# Patient Record
Sex: MALE | Race: WHITE | NOT HISPANIC OR LATINO | Employment: OTHER | URBAN - METROPOLITAN AREA
[De-identification: names, ages, dates, MRNs, and addresses within clinical notes are randomized per-mention and may not be internally consistent; named-entity substitution may affect disease eponyms.]

---

## 2017-10-30 ENCOUNTER — ANESTHESIA (INPATIENT)
Dept: PERIOP | Facility: HOSPITAL | Age: 52
DRG: 710 | End: 2017-10-30
Payer: COMMERCIAL

## 2017-10-30 ENCOUNTER — ANESTHESIA EVENT (INPATIENT)
Dept: PERIOP | Facility: HOSPITAL | Age: 52
DRG: 710 | End: 2017-10-30
Payer: COMMERCIAL

## 2017-10-30 ENCOUNTER — HOSPITAL ENCOUNTER (INPATIENT)
Facility: HOSPITAL | Age: 52
LOS: 8 days | Discharge: HOME HEALTH | DRG: 710 | End: 2017-11-07
Attending: EMERGENCY MEDICINE | Admitting: HOSPITALIST
Payer: COMMERCIAL

## 2017-10-30 ENCOUNTER — APPOINTMENT (EMERGENCY)
Dept: RADIOLOGY | Facility: HOSPITAL | Age: 52
DRG: 710 | End: 2017-10-30
Payer: COMMERCIAL

## 2017-10-30 ENCOUNTER — APPOINTMENT (INPATIENT)
Dept: RADIOLOGY | Facility: HOSPITAL | Age: 52
DRG: 710 | End: 2017-10-30
Payer: COMMERCIAL

## 2017-10-30 DIAGNOSIS — E11.628 DIABETIC FOOT INFECTION (HCC): Primary | ICD-10-CM

## 2017-10-30 DIAGNOSIS — L03.116 CELLULITIS OF LEFT FOOT: ICD-10-CM

## 2017-10-30 DIAGNOSIS — L08.9 DIABETIC FOOT INFECTION (HCC): Primary | ICD-10-CM

## 2017-10-30 DIAGNOSIS — A41.9 SEVERE SEPSIS (HCC): ICD-10-CM

## 2017-10-30 DIAGNOSIS — L97.523 DIABETIC ULCER OF TOE OF LEFT FOOT ASSOCIATED WITH DIABETES MELLITUS DUE TO UNDERLYING CONDITION, WITH NECROSIS OF MUSCLE (HCC): ICD-10-CM

## 2017-10-30 DIAGNOSIS — L97.509 TYPE 2 DIABETES MELLITUS WITH FOOT ULCER, WITHOUT LONG-TERM CURRENT USE OF INSULIN (HCC): Chronic | ICD-10-CM

## 2017-10-30 DIAGNOSIS — E08.621 DIABETIC ULCER OF TOE OF LEFT FOOT ASSOCIATED WITH DIABETES MELLITUS DUE TO UNDERLYING CONDITION, WITH NECROSIS OF MUSCLE (HCC): ICD-10-CM

## 2017-10-30 DIAGNOSIS — E11.621 TYPE 2 DIABETES MELLITUS WITH FOOT ULCER, WITHOUT LONG-TERM CURRENT USE OF INSULIN (HCC): Chronic | ICD-10-CM

## 2017-10-30 DIAGNOSIS — A48.0 GAS GANGRENE OF FOOT (HCC): ICD-10-CM

## 2017-10-30 DIAGNOSIS — R65.20 SEVERE SEPSIS (HCC): ICD-10-CM

## 2017-10-30 DIAGNOSIS — M86.172 ACUTE OSTEOMYELITIS OF TOE, LEFT (HCC): ICD-10-CM

## 2017-10-30 PROBLEM — I10 ESSENTIAL HYPERTENSION: Chronic | Status: ACTIVE | Noted: 2017-10-30

## 2017-10-30 LAB
ANION GAP SERPL CALCULATED.3IONS-SCNC: 10 MMOL/L (ref 4–13)
BASOPHILS # BLD AUTO: 0.06 THOUSANDS/ΜL (ref 0–0.1)
BASOPHILS NFR BLD AUTO: 0 % (ref 0–1)
BUN SERPL-MCNC: 8 MG/DL (ref 5–25)
CALCIUM SERPL-MCNC: 8.6 MG/DL (ref 8.3–10.1)
CHLORIDE SERPL-SCNC: 90 MMOL/L (ref 100–108)
CO2 SERPL-SCNC: 27 MMOL/L (ref 21–32)
CREAT SERPL-MCNC: 0.95 MG/DL (ref 0.6–1.3)
EOSINOPHIL # BLD AUTO: 0.03 THOUSAND/ΜL (ref 0–0.61)
EOSINOPHIL NFR BLD AUTO: 0 % (ref 0–6)
ERYTHROCYTE [DISTWIDTH] IN BLOOD BY AUTOMATED COUNT: 11.9 % (ref 11.6–15.1)
GFR SERPL CREATININE-BSD FRML MDRD: 90 ML/MIN/1.73SQ M
GLUCOSE SERPL-MCNC: 302 MG/DL (ref 65–140)
GLUCOSE SERPL-MCNC: 475 MG/DL (ref 65–140)
HCT VFR BLD AUTO: 40.3 % (ref 36.5–49.3)
HGB BLD-MCNC: 13.8 G/DL (ref 12–17)
LACTATE SERPL-SCNC: 0.9 MMOL/L (ref 0.5–2)
LACTATE SERPL-SCNC: 2.9 MMOL/L (ref 0.5–2)
LYMPHOCYTES # BLD AUTO: 1.86 THOUSANDS/ΜL (ref 0.6–4.47)
LYMPHOCYTES NFR BLD AUTO: 9 % (ref 14–44)
MCH RBC QN AUTO: 28.9 PG (ref 26.8–34.3)
MCHC RBC AUTO-ENTMCNC: 34.2 G/DL (ref 31.4–37.4)
MCV RBC AUTO: 84 FL (ref 82–98)
MONOCYTES # BLD AUTO: 1.12 THOUSAND/ΜL (ref 0.17–1.22)
MONOCYTES NFR BLD AUTO: 5 % (ref 4–12)
NEUTROPHILS # BLD AUTO: 18.25 THOUSANDS/ΜL (ref 1.85–7.62)
NEUTS SEG NFR BLD AUTO: 86 % (ref 43–75)
PLATELET # BLD AUTO: 563 THOUSANDS/UL (ref 149–390)
PMV BLD AUTO: 8.8 FL (ref 8.9–12.7)
POTASSIUM SERPL-SCNC: 3.8 MMOL/L (ref 3.5–5.3)
RBC # BLD AUTO: 4.78 MILLION/UL (ref 3.88–5.62)
SODIUM SERPL-SCNC: 127 MMOL/L (ref 136–145)
WBC # BLD AUTO: 21.32 THOUSAND/UL (ref 4.31–10.16)

## 2017-10-30 PROCEDURE — 96365 THER/PROPH/DIAG IV INF INIT: CPT

## 2017-10-30 PROCEDURE — 0Y6Q0Z0 DETACHMENT AT LEFT 1ST TOE, COMPLETE, OPEN APPROACH: ICD-10-PCS | Performed by: PODIATRIST

## 2017-10-30 PROCEDURE — 83605 ASSAY OF LACTIC ACID: CPT | Performed by: HOSPITALIST

## 2017-10-30 PROCEDURE — 0QBP0ZZ EXCISION OF LEFT METATARSAL, OPEN APPROACH: ICD-10-PCS | Performed by: PODIATRIST

## 2017-10-30 PROCEDURE — 87076 CULTURE ANAEROBE IDENT EACH: CPT | Performed by: EMERGENCY MEDICINE

## 2017-10-30 PROCEDURE — 73630 X-RAY EXAM OF FOOT: CPT

## 2017-10-30 PROCEDURE — 87077 CULTURE AEROBIC IDENTIFY: CPT | Performed by: PHYSICIAN ASSISTANT

## 2017-10-30 PROCEDURE — 80048 BASIC METABOLIC PNL TOTAL CA: CPT | Performed by: EMERGENCY MEDICINE

## 2017-10-30 PROCEDURE — 88311 DECALCIFY TISSUE: CPT | Performed by: PODIATRIST

## 2017-10-30 PROCEDURE — 82948 REAGENT STRIP/BLOOD GLUCOSE: CPT

## 2017-10-30 PROCEDURE — 36415 COLL VENOUS BLD VENIPUNCTURE: CPT | Performed by: EMERGENCY MEDICINE

## 2017-10-30 PROCEDURE — 87070 CULTURE OTHR SPECIMN AEROBIC: CPT | Performed by: PHYSICIAN ASSISTANT

## 2017-10-30 PROCEDURE — 87040 BLOOD CULTURE FOR BACTERIA: CPT | Performed by: EMERGENCY MEDICINE

## 2017-10-30 PROCEDURE — 83605 ASSAY OF LACTIC ACID: CPT | Performed by: EMERGENCY MEDICINE

## 2017-10-30 PROCEDURE — 85025 COMPLETE CBC W/AUTO DIFF WBC: CPT | Performed by: EMERGENCY MEDICINE

## 2017-10-30 PROCEDURE — 99285 EMERGENCY DEPT VISIT HI MDM: CPT

## 2017-10-30 PROCEDURE — 87186 SC STD MICRODIL/AGAR DIL: CPT | Performed by: PHYSICIAN ASSISTANT

## 2017-10-30 PROCEDURE — 87205 SMEAR GRAM STAIN: CPT | Performed by: PODIATRIST

## 2017-10-30 PROCEDURE — 87185 SC STD ENZYME DETCJ PER NZM: CPT | Performed by: PODIATRIST

## 2017-10-30 PROCEDURE — 87147 CULTURE TYPE IMMUNOLOGIC: CPT | Performed by: PODIATRIST

## 2017-10-30 PROCEDURE — 87205 SMEAR GRAM STAIN: CPT | Performed by: PHYSICIAN ASSISTANT

## 2017-10-30 PROCEDURE — 88305 TISSUE EXAM BY PATHOLOGIST: CPT | Performed by: PODIATRIST

## 2017-10-30 PROCEDURE — 87070 CULTURE OTHR SPECIMN AEROBIC: CPT | Performed by: PODIATRIST

## 2017-10-30 PROCEDURE — 87075 CULTR BACTERIA EXCEPT BLOOD: CPT | Performed by: PODIATRIST

## 2017-10-30 RX ORDER — MIDAZOLAM HYDROCHLORIDE 1 MG/ML
INJECTION INTRAMUSCULAR; INTRAVENOUS AS NEEDED
Status: DISCONTINUED | OUTPATIENT
Start: 2017-10-30 | End: 2017-10-30 | Stop reason: SURG

## 2017-10-30 RX ORDER — SODIUM CHLORIDE 9 MG/ML
125 INJECTION, SOLUTION INTRAVENOUS CONTINUOUS
Status: DISCONTINUED | OUTPATIENT
Start: 2017-10-30 | End: 2017-11-03

## 2017-10-30 RX ORDER — ONDANSETRON 2 MG/ML
4 INJECTION INTRAMUSCULAR; INTRAVENOUS EVERY 6 HOURS PRN
Status: DISCONTINUED | OUTPATIENT
Start: 2017-10-30 | End: 2017-11-07 | Stop reason: HOSPADM

## 2017-10-30 RX ORDER — INSULIN GLARGINE 100 [IU]/ML
40 INJECTION, SOLUTION SUBCUTANEOUS
Status: DISCONTINUED | OUTPATIENT
Start: 2017-10-30 | End: 2017-11-07

## 2017-10-30 RX ORDER — LISINOPRIL 10 MG/1
10 TABLET ORAL DAILY
Status: DISCONTINUED | OUTPATIENT
Start: 2017-10-30 | End: 2017-11-07 | Stop reason: HOSPADM

## 2017-10-30 RX ORDER — FENTANYL CITRATE 50 UG/ML
INJECTION, SOLUTION INTRAMUSCULAR; INTRAVENOUS AS NEEDED
Status: DISCONTINUED | OUTPATIENT
Start: 2017-10-30 | End: 2017-10-30 | Stop reason: SURG

## 2017-10-30 RX ORDER — SODIUM CHLORIDE, SODIUM LACTATE, POTASSIUM CHLORIDE, CALCIUM CHLORIDE 600; 310; 30; 20 MG/100ML; MG/100ML; MG/100ML; MG/100ML
INJECTION, SOLUTION INTRAVENOUS CONTINUOUS PRN
Status: DISCONTINUED | OUTPATIENT
Start: 2017-10-30 | End: 2017-10-30 | Stop reason: SURG

## 2017-10-30 RX ORDER — LISINOPRIL 10 MG/1
10 TABLET ORAL DAILY
COMMUNITY

## 2017-10-30 RX ORDER — NICOTINE 21 MG/24HR
1 PATCH, TRANSDERMAL 24 HOURS TRANSDERMAL DAILY
Status: DISCONTINUED | OUTPATIENT
Start: 2017-10-30 | End: 2017-11-07 | Stop reason: HOSPADM

## 2017-10-30 RX ADMIN — PIPERACILLIN SODIUM,TAZOBACTAM SODIUM 3.38 G: 3; .375 INJECTION, POWDER, FOR SOLUTION INTRAVENOUS at 15:52

## 2017-10-30 RX ADMIN — FENTANYL CITRATE 50 MCG: 50 INJECTION INTRAMUSCULAR; INTRAVENOUS at 19:54

## 2017-10-30 RX ADMIN — SODIUM CHLORIDE 1000 ML: 0.9 INJECTION, SOLUTION INTRAVENOUS at 12:35

## 2017-10-30 RX ADMIN — SODIUM CHLORIDE, SODIUM LACTATE, POTASSIUM CHLORIDE, AND CALCIUM CHLORIDE: .6; .31; .03; .02 INJECTION, SOLUTION INTRAVENOUS at 19:52

## 2017-10-30 RX ADMIN — FENTANYL CITRATE 50 MCG: 50 INJECTION INTRAMUSCULAR; INTRAVENOUS at 19:56

## 2017-10-30 RX ADMIN — CEFEPIME 2000 MG: 2 INJECTION, POWDER, FOR SOLUTION INTRAMUSCULAR; INTRAVENOUS at 12:35

## 2017-10-30 RX ADMIN — VANCOMYCIN HYDROCHLORIDE 1500 MG: 1 INJECTION, POWDER, LYOPHILIZED, FOR SOLUTION INTRAVENOUS at 13:05

## 2017-10-30 RX ADMIN — ENOXAPARIN SODIUM 40 MG: 40 INJECTION SUBCUTANEOUS at 15:14

## 2017-10-30 RX ADMIN — SODIUM CHLORIDE 1000 ML: 0.9 INJECTION, SOLUTION INTRAVENOUS at 13:32

## 2017-10-30 RX ADMIN — SODIUM CHLORIDE 125 ML/HR: 0.9 INJECTION, SOLUTION INTRAVENOUS at 14:36

## 2017-10-30 RX ADMIN — MIDAZOLAM HYDROCHLORIDE 2 MG: 1 INJECTION, SOLUTION INTRAMUSCULAR; INTRAVENOUS at 19:54

## 2017-10-30 RX ADMIN — INSULIN LISPRO 3 UNITS: 100 INJECTION, SOLUTION INTRAVENOUS; SUBCUTANEOUS at 21:56

## 2017-10-30 RX ADMIN — SODIUM CHLORIDE 1000 ML: 0.9 INJECTION, SOLUTION INTRAVENOUS at 13:33

## 2017-10-30 RX ADMIN — INSULIN GLARGINE 40 UNITS: 100 INJECTION, SOLUTION SUBCUTANEOUS at 21:55

## 2017-10-30 NOTE — CONSULTS
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   This is clearly a limb threatening infection  Fortunately thus far the patient has remained hemodynamically stable and it appears that the infection has remained confined to the forefoot  The patient has tolerated his 1st dose of intravenous antibiotics  I explained to the patient that he will need significant surgery for this infection   -continue vancomycin for now at current dose  -increase Zosyn to 4 5 g IV q 6 hours to cover Pseudomonas aeruginosa  -follow-up wound culture  -await urgent podiatry evaluation for surgical intervention  -serial exams  -monitor CBC with diff and creatinine    2  Severe sepsis-POA  Leukocytosis and tachycardia with elevated lactate  Fortunately the patient remains hemodynamically stable  Appears all secondary to 1  No other clear source appreciated  Consideration for the possibility of bacteremia   -antibiotics as above  -follow-up blood cultures  -supportive care  -no additional ID workup for now    3  Diabetes mellitus-type 2 with hyperglycemia  Very poorly controlled diabetes at this time   -tighten glucose control as per the primary service    4  Hyponatremia-likely pseudohyponatremia secondary to the hyperglycemia   -monitor BMP    Discussed in detail with Podiatry and the primary service as well as the patient and his wife     HISTORY OF PRESENT ILLNESS:  Reason for Consult:  Gas gangrene of the left foot  HPI: Denae Meredith is a 45y o  year old male with a history of poorly controlled diabetes mellitus admitted to [facility] with worsening swelling, redness, and gangrene of his left hallux who I am asked to assist with management  The patient denies having any recent injury to the foot    However he has been in work boots in recent weeks and has been on his feet quite a bit  Over the past 1-2 weeks the patient has developed worsening swelling and drainage from his left hallux  Apparently the odor became so bad that a co-worker was forced to get out of the car to get a deep breath  He apparently had rapidly increasing gangrenous change in was involving the left hallux  He also complained of subjective fevers and shaking chills as well as drenching sweats  His glucose also became more poorly controlled  Because of these worsening symptoms the patient came to the ER today for further evaluation  In the emergency department the patient was found to have a gangrenous left hallux with therese id swelling and malodorous drainage  X-ray of the foot revealed evidence of gas gangrene  The patient had blood cultures obtained and was started on vancomycin and Zosyn and admitted for further management  Thus far the patient has remained afebrile although his temperature has been modestly elevated  He has been noted to have a brisk leukocytosis and has tachycardia  He did have 1 episode of emesis last week but has not had any other nausea vomiting or diarrhea  He denies any cough or difficulty breathing, denies any foot pain, denies any dysuria or hematuria, denies any other rash or skin lesions  REVIEW OF SYSTEMS:  A complete 12 point system-based review of systems is otherwise negative  PAST MEDICAL HISTORY:  Past Medical History:   Diagnosis Date    Diabetes mellitus ([facility] [address] )     Hypertension      History reviewed  No pertinent surgical history  FAMILY HISTORY:  Non-contributory    SOCIAL HISTORY:  Social History   History   Alcohol Use    Yes     History   Drug Use No     History   Smoking Status    Current Some Day Smoker    Types: Cigars   Smokeless Tobacco    Never Used       ALLERGIES:  No Known Allergies    MEDICATIONS:  All current active medications have been reviewed  Antibiotics:  Vancomycin and Zosyn 1      PHYSICAL EXAM:  HR: [101-131] 101  Resp:  [14-20] 20  BP: (140-175)/() 145/85  SpO2:  [91 %-97 %] 95 %  Temp (24hrs), Av 3 °F (37 4 °C), Min:98 7 °F (37 1 °C), Max:100 °F (37 8 °C)  Current: Temperature: 100 °F (37 8 °C)    Intake/Output Summary (Last 24 hours) at 10/30/17 1624  Last data filed at 10/30/17 1335   Gross per 24 hour   Intake             1050 ml   Output                0 ml   Net             1050 ml       General Appearance:  Nontoxic, and in no distress   Head:  Normocephalic, without obvious abnormality, atraumatic   Eyes:  Conjunctiva pink and sclera anicteric, both eyes   Nose: Nares normal, mucosa normal, no drainage   Throat: Oropharynx moist without lesions   Neck: Supple, symmetrical, no adenopathy, no tenderness/mass/nodules   Back:   Symmetric, no curvature, ROM normal, no CVA tenderness   Lungs:   Clear to auscultation bilaterally, respirations unlabored   Chest Wall:  No tenderness or deformity   Heart:  Tachycardic; no murmur, rub or gallop   Abdomen:   Soft, non-tender, non-distended, positive bowel sounds,    Extremities: No cyanosis, clubbing  Left hallux markedly swollen, gangrenous, with blackish malodorous drainage  Erythema progressing onto the forefoot   Skin: No other rashes or lesions  No other draining wounds noted  Lymph nodes: Cervical, supraclavicular nodes normal   Neurologic: Alert and oriented times 3, extremity strength 5/5 and symmetric       LABS, IMAGING, & OTHER STUDIES:  Lab Results:  I have personally reviewed pertinent labs      Results from last 7 days  Lab Units 10/30/17  1215   WBC Thousand/uL 21 32*   HEMOGLOBIN g/dL 13 8   PLATELETS Thousands/uL 563*       Results from last 7 days  Lab Units 10/30/17  1215   SODIUM mmol/L 127*   POTASSIUM mmol/L 3 8   CHLORIDE mmol/L 90*   CO2 mmol/L 27   ANION GAP mmol/L 10   BUN mg/dL 8   CREATININE mg/dL 0 95   EGFR ml/min/1 73sq m 90   GLUCOSE RANDOM mg/dL 475*   CALCIUM mg/dL 8 6        blood cultures x2 sets pending    Imaging Studies:   I have personally reviewed pertinent imaging study reports and images in PACS  X-ray left foot-1   Extensive subcutaneous air noted at the 1st toe extending to the 1st metatarsal region along with soft tissue swelling   Correlate for gas gangrene    2    Comminuted fracture at the 1st distal phalanx proximally   Fracture margins are ill-defined, underlying osteomyelitis is not excluded

## 2017-10-30 NOTE — ED PROCEDURE NOTE
PROCEDURE  CriticalCare Time  Performed by: Palmer Ordaz  Authorized by: Palmer Ordaz     Critical care provider statement:     Critical care time (minutes):  35    Critical care start time:  10/30/2017 12:25 PM    Critical care end time:  10/30/2017 1:00 PM    Critical care time was exclusive of:  Separately billable procedures and treating other patients and teaching time    Critical care was time spent personally by me on the following activities:  Examination of patient, discussions with consultants, evaluation of patient's response to treatment, ordering and review of radiographic studies, ordering and review of laboratory studies and re-evaluation of patient's condition    I assumed direction of critical care for this patient from another provider in my specialty: no

## 2017-10-30 NOTE — H&P
History and Physical - [facility] Internal Medicine    Patient Information: Ilean Ahumada 45 y o  male MRN: [id number]  Unit/Bed#: DEMARCO Encounter: [id number]  Admitting Physician: Iztel Irizarry PA-C  PCP: Loren Gregorio DO  Date of Admission:  10/30/17    Assessment/Plan:    Hospital Problem List:     Principal Problem:    Diabetic ulcer of left foot associated with diabetes mellitus due to underlying condition, with necrosis of muscle ([facility] )  Active Problems:    Essential hypertension    Type 2 diabetes mellitus with foot ulcer, without long-term current use of insulin ([facility] )    Severe sepsis ([facility] )      Plan for the Primary Problem(s):  · Diabetic Ulcer of Left Foot Associated with Diabetes Mellitus due to underlying condition, with necrosis of muscle   · Admit patient to med/surgical under inpatient status with telemetry monitoring given sepsis   · POA: malodorous left great toe with erythema extending to mid foot circumferentially as well as edema up the left calf, leukocytosis, tachycardia, lactic acid 2 9 resulting in severe sepsis criteria, gas noted on x-ray   · Spoke with infectious disease physician about preliminary antibiotics recommendations   · Zosyn 3 375 g IV Q6  · Vancomycin 15 mg/kg Q12   · Consult infectious disease for further management   · Monitor renal function closely   · Consult podiatry   · Wound culture  · NPO now given may need procedure today   · Monitor lactic acid   · Trend temps, cbc, bmp   · Severe Sepsis  · POA: tachycardia, leukocytosis, source of infection, and lactic acid = 2 9  · Received sepsis fluid bolus in ED  · Trend lactic acid   · Antibiotics and physician consults as above     Plan for Additional Problems:   · Type 2 Diabetes Mellitus with skin complication without long term current use of insulin   · BG on admission = 476   · Hold Metformin   · Lantus 40 U QHS  · Hold mealtime insulin given NPO  · SSI insulin Q6 PRN hyperglycemia for now     VTE Prophylaxis: Enoxaparin (Lovenox)  / sequential compression device   Code Status: Full code   POLST: POLST form is not discussed and not completed at this time  Anticipated Length of Stay:  Patient will be admitted on an Inpatient basis with an anticipated length of stay of  Less than 2 midnights  Justification for Hospital Stay: IV abx, podiatry eval and surgery, severe sepsis    Total Time for Visit, including Counseling / Coordination of Care: 1 hour  Greater than 50% of this total time spent on direct patient counseling and coordination of care  Chief Complaint:   "My toe is infected"    History of Present Illness:    Kj Loera is a 45 y o  male with a history of Type 2 Diabetes Mellitus who presents with a left great toe infection  The patient reports that this has been going on for the last 1 5 weeks  He states that it started as what looked like a "callus" on the bottom part of his foot and rapidly progressed to a draining, black wound  He states that 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 and he had been wearing boots and ambulating on the foot for long hours during the day  He states that a few days ago the foot started to have a bad odor to the point where his colleague had to step out of the vehicle due to the smell  He denies pain to the area and states that he does have a history of neuropathy  He reports some mild chills  Denies any other wounds active currently, however he does state that he had a prior ulceration on his right calf that he was able to heal on his own  He denies fevers, chest pain,pressure, difficulty breathing, or difficulty swallowing  Of note the patient states that he does follow with a PCP, but states that he was only ever placed on Metformin for his diabetes  He is unsure of his last A1c  He states that he checks his sugars at home and runs between 160 to 260  He states that he does see an ophthalmologist regularly and he has documented diabetic retinopathy       Review of Systems:    Review of Systems   Constitutional: Positive for chills  Negative for appetite change, diaphoresis, fatigue and fever  HENT: Negative for congestion, rhinorrhea and sore throat  Eyes: Negative for visual disturbance  Respiratory: Negative for cough, chest tightness, shortness of breath and wheezing  Cardiovascular: Negative for chest pain, palpitations and leg swelling  Gastrointestinal: Negative for abdominal pain, constipation, diarrhea, nausea and vomiting  Genitourinary: Negative for dysuria  Musculoskeletal: Negative for arthralgias, gait problem and myalgias  Skin: Positive for color change and wound  Neurological: Negative for dizziness, syncope, weakness, light-headedness, numbness and headaches  All other systems reviewed and are negative  Past Medical and Surgical History:     Past Medical History:   Diagnosis Date    Diabetes mellitus ([facility] [address] )     Hypertension        History reviewed  No pertinent surgical history  Meds/Allergies:    Prior to Admission medications    Medication Sig Start Date End Date Taking? Authorizing Provider   lisinopril (ZESTRIL) 10 mg tablet Take 10 mg by mouth daily   Yes Historical Provider, MD   metFORMIN (GLUCOPHAGE) 500 mg tablet Take 500 mg by mouth 2 (two) times a day with meals   Yes Historical Provider, MD   ondansetron (ZOFRAN ODT) 4 mg disintegrating tablet Take 1 tablet by mouth every 8 (eight) hours as needed for nausea or vomiting 10/5/16 10/30/17  Haile Campos, DO     I have reviewed home medications with patient personally      Allergies: No Known Allergies    Social History:     Marital Status: /Civil Union   Occupation: Security  Patient Pre-hospital Living Situation: Home with wife   Patient Pre-hospital Level of Mobility: Full   Patient Pre-hospital Diet Restrictions: None  Substance Use History:   History   Alcohol Use    Yes     History   Smoking Status    Current Some Day Smoker    Types: Cigars Smokeless Tobacco    Never Used     History   Drug Use No       Family History:    History reviewed  No pertinent family history  Physical Exam:     Vitals:   Blood Pressure: 154/79 (10/30/17 1330)  Pulse: (!) 108 (10/30/17 1330)  Temperature: 98 7 °F (37 1 °C) (10/30/17 1134)  Temp Source: Oral (10/30/17 1134)  Respirations: 14 (10/30/17 1330)  Weight - Scale: 102 kg (225 lb 5 oz) (10/30/17 1105)  SpO2: 91 % (10/30/17 1330)    Physical Exam   Constitutional: He is oriented to person, place, and time  Vital signs are normal  He appears well-developed and well-nourished  Non-toxic appearance  No distress  HENT:   Head: Normocephalic and atraumatic  Mouth/Throat: Mucous membranes are not dry  Normal dentition  Eyes: Conjunctivae and EOM are normal  Pupils are equal, round, and reactive to light  Right pupil is round and reactive  Left pupil is round and reactive  Pupils are equal    Neck: Neck supple  Cardiovascular: Normal rate, regular rhythm, S1 normal, S2 normal, normal heart sounds and intact distal pulses  Exam reveals no S3 and no S4  No murmur heard  Pulmonary/Chest: Effort normal and breath sounds normal  No accessory muscle usage  No respiratory distress  He has no decreased breath sounds  He has no wheezes  He has no rhonchi  He has no rales  He exhibits no tenderness  Abdominal: Soft  Bowel sounds are normal  He exhibits no distension and no mass  There is no tenderness  There is no rigidity, no rebound and no guarding  Neurological: He is alert and oriented to person, place, and time  A sensory deficit (sensory deficit in stocking formation of the bilateral lower extremity up to the mid-calf) is present  Skin: Skin is warm and dry  Additional Data:     Lab Results: I have personally reviewed pertinent reports          Results from last 7 days  Lab Units 10/30/17  1215   WBC Thousand/uL 21 32*   HEMOGLOBIN g/dL 13 8   HEMATOCRIT % 40 3   PLATELETS Thousands/uL 563* NEUTROS PCT % 86*   LYMPHS PCT % 9*   MONOS PCT % 5   EOS PCT % 0       Results from last 7 days  Lab Units 10/30/17  1215   SODIUM mmol/L 127*   POTASSIUM mmol/L 3 8   CHLORIDE mmol/L 90*   CO2 mmol/L 27   BUN mg/dL 8   CREATININE mg/dL 0 95   CALCIUM mg/dL 8 6   GLUCOSE RANDOM mg/dL 475*           Imaging: I have personally reviewed pertinent reports  Xr Foot 3+ Views Left    Result Date: 10/30/2017  Narrative: LEFT FOOT INDICATION:  Subcutaneous air  COMPARISON: None VIEWS:  3 IMAGES:  4 FINDINGS: Extensive subcutaneous air is noted at the 1st extending to the 1st metatarsal region along with soft tissue swelling  This partially limits evaluation of underlying osseous structures  There does appear to be a comminuted fracture at the 1st distal phalanx proximally  Fracture margins are ill-defined, underlying osteomyelitis is not excluded  Hammertoe is noted of the 2nd and 3rd toes  There is a plantar calcaneal heel spur  Impression: 1  Extensive subcutaneous air noted at the 1st toe extending to the 1st metatarsal region along with soft tissue swelling  Correlate for gas gangrene  2    Comminuted fracture at the 1st distal phalanx proximally  Fracture margins are ill-defined, underlying osteomyelitis is not excluded  ##[email or web address]##[email or web address] Workstation performed: [id number]       EKG, Pathology, and Other Studies Reviewed on Admission:   · XR left foot: gas present in medial side of study as well as through out the great toe, suspicious for osteo    Allscripts Records Reviewed: No     ** Please Note: Dragon 360 Dictation voice to text software may have been used in the creation of this document   **

## 2017-10-30 NOTE — ED PROVIDER NOTES
History  Chief Complaint   Patient presents with    Toe Injury - Major     c/o left great toe and toenail black with odorous drainage noted  Also left foot and ankle swelling and increased blood sugars x 1 week  45 YR  MALE NIDDM ON METFROMIN -- STATES ALWAys had small opne area on plantar asepct of left great toe -- over last 2 weeks with progressive redness/warmth/ discoloration of left great toe-- pt has ble neuropathy- has no0 ble sensation  From feet to mid ble- no trauma-  To area-  N/v at wortk 1 week ago- bs greater than 200--  Mild chills- no defiatne fevers-         History provided by:  Patient and spouse   used: No        Prior to Admission Medications   Prescriptions Last Dose Informant Patient Reported? Taking?   ondansetron (ZOFRAN ODT) 4 mg disintegrating tablet   No No   Sig: Take 1 tablet by mouth every 8 (eight) hours as needed for nausea or vomiting      Facility-Administered Medications: None       Past Medical History:   Diagnosis Date    Diabetes mellitus ([facility] [address] )     Hypertension        History reviewed  No pertinent surgical history  History reviewed  No pertinent family history  I have reviewed and agree with the history as documented  Social History   Substance Use Topics    Smoking status: Current Some Day Smoker     Types: Cigars    Smokeless tobacco: Never Used    Alcohol use Yes        Review of Systems   Constitutional: Positive for appetite change and chills  Negative for activity change, diaphoresis, fatigue, fever and unexpected weight change  HENT: Negative  Eyes: Negative  Cardiovascular: Positive for leg swelling  Negative for chest pain and palpitations  Gastrointestinal: Negative  Endocrine: Negative  Genitourinary: Negative  Musculoskeletal: Negative  Skin: Positive for wound  Negative for color change, pallor and rash  Allergic/Immunologic: Negative  Neurological: Negative  Hematological: Negative  Psychiatric/Behavioral: Negative  Physical Exam  ED Triage Vitals [10/30/17 1105]   Temperature Pulse Respirations Blood Pressure SpO2   99 1 °F (37 3 °C) (!) 131 20 (!) 175/100 97 %      Temp Source Heart Rate Source Patient Position - Orthostatic VS BP Location FiO2 (%)   Oral Monitor Sitting Left arm --      Pain Score       No Pain           Orthostatic Vital Signs  Vitals:    10/30/17 1105 10/30/17 1230   BP: (!) 175/100 140/86   Pulse: (!) 131 (!) 110   Patient Position - Orthostatic VS: Sitting        Physical Exam   Constitutional: He is oriented to person, place, and time  He appears well-developed and well-nourished  No distress  tchycardic-- pulse ox 95 % on ra- intepretation is normal- no [id number]    HENT:   Head: Normocephalic and atraumatic  Eyes: Conjunctivae and EOM are normal  Pupils are equal, round, and reactive to light  Right eye exhibits no discharge  Left eye exhibits no discharge  No scleral icterus  Mm pink   Neck: Normal range of motion  Neck supple  No JVD present  No tracheal deviation present  No thyromegaly present  Cardiovascular: Regular rhythm, normal heart sounds and intact distal pulses  Exam reveals no gallop and no friction rub  No murmur heard  Pulmonary/Chest: Effort normal and breath sounds normal  No stridor  No respiratory distress  He has no wheezes  He has no rales  He exhibits no tenderness  Abdominal: Soft  Bowel sounds are normal  He exhibits no distension and no mass  There is no tenderness  There is no rebound and no guarding  No hernia  Musculoskeletal: He exhibits edema and deformity  He exhibits no tenderness  Lymphadenopathy:     He has no cervical adenopathy  Neurological: He is alert and oriented to person, place, and time  No cranial nerve deficit or sensory deficit  He exhibits normal muscle tone   Coordination normal    2 plus ble lle pretibial edema- pos necrotic  Left great toe  With mutliple opne areas- defromity -- midl distal mid dorsum of foot erythema- no crepitus-- ascending erythema   Skin: Skin is warm  Capillary refill takes less than 2 seconds  He is not diaphoretic  There is erythema  No pallor  Psychiatric: He has a normal mood and affect  His behavior is normal  Thought content normal    Nursing note and vitals reviewed  ED Medications  Medications   sodium chloride 0 9 % bolus 1,000 mL (1,000 mL Intravenous New Bag 10/30/17 1235)   vancomycin (VANCOCIN) 1,500 mg in sodium chloride 0 9 % 250 mL IVPB (not administered)   cefepime (MAXIPIME) 2 g/50 mL dextrose IVPB (2,000 mg Intravenous New Bag 10/30/17 1235)       Diagnostic Studies  Results Reviewed     Procedure Component Value Units Date/Time    CBC and differential [[id number]]  (Abnormal) Collected:  10/30/17 1215    Lab Status:  Final result Specimen:  Blood from Arm, Right Updated:  10/30/17 1226     WBC 21 32 (H) Thousand/uL      RBC 4 78 Million/uL      Hemoglobin 13 8 g/dL      Hematocrit 40 3 %      MCV 84 fL      MCH 28 9 pg      MCHC 34 2 g/dL      RDW 11 9 %      MPV 8 8 (L) fL      Platelets 017 (H) Thousands/uL      Neutrophils Relative 86 (H) %      Lymphocytes Relative 9 (L) %      Monocytes Relative 5 %      Eosinophils Relative 0 %      Basophils Relative 0 %      Neutrophils Absolute 18 25 (H) Thousands/µL      Lymphocytes Absolute 1 86 Thousands/µL      Monocytes Absolute 1 12 Thousand/µL      Eosinophils Absolute 0 03 Thousand/µL      Basophils Absolute 0 06 Thousands/µL     Blood culture #1 [[id number]] Collected:  10/30/17 1215    Lab Status: In process Specimen:  Blood from Arm, Right Updated:  10/30/17 1223    Blood culture #2 [[id number]] Collected:  10/30/17 1215    Lab Status: In process Specimen:  Blood from Arm, Right Updated:  10/30/17 7228    Basic metabolic panel [[id number]] Collected:  10/30/17 1215    Lab Status:   In process Specimen:  Blood from Arm, Right Updated:  10/30/17 1223    Lactic acid, plasma [[id number]] Collected: 10/30/17 1215    Lab Status: In process Specimen:  Blood from Arm, Right Updated:  10/30/17 1223                 XR foot 3+ views LEFT    (Results Pending)              Procedures  Procedures       Phone Contacts  ED Phone Contact    ED Course  ED Course as of Oct 30 1300   Mon Oct 30, 2017   1227 -- ER MD NOTE-  SURGICAL PA AT  [facility] - PAGED-- AWAIT CALL BACK --     [id number] ER MD NOTE- CASE D/W- SURGERICAL PA-  STATES USUALLY GOES TO PODIATRY WITH SLIM ADMIT-- PODIATRY PAGED     1255 LEFT GREAT TOE- POS DISTAL RAY SIGNS OF OSTEO- POS SURROUNDING SQ AIR-- NO FB SEEN     1257 - ER MD NOTE- CASE D/W- PODIATRY - DR GARCIA-  WILL CONSULT HIM - SLIM PAGED FOR ADMIT- PT AND WIFE AWARE  OF THIS                                MDM  The patient presented with a condition in which there was a high probability of imminent or life-threatening deterioration, and critical care services (excluding separately billable procedures) totalled 30-74 minutes  Disposition  Final diagnoses:   None     ED Disposition     None      Follow-up Information    None       Patient's Medications   Discharge Prescriptions    No medications on file     No discharge procedures on file      ED Provider  Electronically Signed by           Raheem Barton MD  10/30/17 1640       Raheem Barton MD  10/30/17 1642       Raheem Barton MD  10/30/17 2629

## 2017-10-30 NOTE — CONSULTS
Consult - Podiatry   Neftali Chen 45 y o  male MRN: [id number]  Unit/Bed#: -01 Encounter: [id number]    Assessment/Plan     Assessment:  1  Left foot gas gangrene with severe sepsis POA  2  Left hallux osteomyelitis  3  Left foot cellulitis  4  DM neuropathy    Plan:  1  Patient needs emergency forefoot amputation  Plan tonight to decompress infection and plan for staged amputation later this week  I am hoping to save enough for a proximal TMA however this will not be able to be assessed until post-operatively  2  Consent signed, he has been NPO since this morning  Given his spesis, case is emergency and needs to proceed as soon as possible  3  Alternatives, risks and complications discussed  Questions answered  History of Present Illness     HPI:  Neftali Chen is a 45 y o  male who presents with left foot infection  A few eeks ago his left great toe developed a wound  He works on his feet as a   Last week he states after a long shift of work his toe began to [address] black gangrene ankle  It also began to smell and draining brown liquid  His coworkers began complaining of the odor from his feet  He states that he thought he might need amputation but was afraid to come to the hospital   He has had numerous bouts of fevers, nausea, chills  He notices blood sugar was poorly out of control over the last week  He came to the emergency department today and was found to have a malodor straining gangrenous great toe  There is soft tissue emphysema noted on the x-ray  He he was also found to be septic with tachycardia leukocytosis and elevated lactic acid  He denies any diarrhea or vomiting  He reports extensive neuropathy to his feet  He does not see a podiatrist regularly       Consults  Review of Systems   Constitutional:  Weakness, fever, chills  HENT: Negative  Eyes: Negative  Respiratory: Negative  Cardiovascular: Negative  Gastrointestinal: Negative  Musculoskeletal:  Negative   Skin:  Left foot wound   Neurological:  Neuropathy  Psych: negative      Historical Information   Past Medical History:   Diagnosis Date    Diabetes mellitus ([facility] [address] )     Hypertension      History reviewed  No pertinent surgical history  Social History   History   Alcohol Use    Yes     History   Drug Use No     History   Smoking Status    Current Some Day Smoker    Types: Cigars   Smokeless Tobacco    Never Used     Family History: History reviewed  No pertinent family history      Meds/Allergies   Prescriptions Prior to Admission   Medication    lisinopril (ZESTRIL) 10 mg tablet    metFORMIN (GLUCOPHAGE) 500 mg tablet     No Known Allergies    Objective   First Vitals:   Blood Pressure: (!) 175/100 (10/30/17 1105)  Pulse: (!) 131 (10/30/17 1105)  Temperature: 99 1 °F (37 3 °C) (10/30/17 1105)  Temp Source: Oral (10/30/17 1105)  Respirations: 20 (10/30/17 1105)  Height: 6' 3" (190 5 cm) (10/30/17 1330)  Weight - Scale: 102 kg (225 lb 5 oz) (10/30/17 1105)  SpO2: 97 % (10/30/17 1105)    Current Vitals:   Blood Pressure: 145/85 (10/30/17 1442)  Pulse: 101 (10/30/17 1442)  Temperature: 100 °F (37 8 °C) (10/30/17 1442)  Temp Source: Oral (10/30/17 1442)  Respirations: 20 (10/30/17 1442)  Height: 6' 3" (190 5 cm) (10/30/17 1442)  Weight - Scale: 107 kg (236 lb 8 9 oz) (10/30/17 1442)  SpO2: 95 % (10/30/17 1442)        /85   Pulse 101   Temp 100 °F (37 8 °C) (Oral)   Resp 20   Ht 6' 3" (1 905 m)   Wt 107 kg (236 lb 8 9 oz)   SpO2 95%   BMI 29 57 kg/m²   Physical Exam:  General:    Alert, cooperative, no distress   Head:    Normocephalic, without obvious abnormality, atraumatic   Eyes:    PERRL, conjunctiva/corneas clear, EOM's intact            Nose:   Moist mucous membranes, no drainage or sinus tenderness   Throat:   No tenderness, no exudates   Neck:   Supple, symmetrical, trachea midline, no JVD   Back:     Symmetric, no CVA tenderness   Lungs:     Clear to auscultation bilaterally, respirations unlabored   Chest wall:    No tenderness or deformity   Heart:    Regular rate and rhythm, S1 and S2 normal   Abdomen:     Soft, non-tender, bowel sounds active all four quadrants           Extremities:   Palpable pedal pulses  The left great toe is gangrenous with strong foul smelling necrotic drainage  Cellulitis extends onto the dorsal midfoot and plantar arch  There is edema up to the mid calf  There is crepitus throughout the great toe extending onto the medial forefoot  Lack of protective sensation  No acute pathology to the right lower extremity  Neurologic:   CNII-XII intact  Lab Results:   Admission on 10/30/2017   Component Date Value    WBC 10/30/2017 21 32*    RBC 10/30/2017 4 78     Hemoglobin 10/30/2017 13 8     Hematocrit 10/30/2017 40 3     MCV 10/30/2017 84     MCH 10/30/2017 28 9     MCHC 10/30/2017 34 2     RDW 10/30/2017 11 9     MPV 10/30/2017 8 8*    Platelets 31/26/0875 563*    Neutrophils Relative 10/30/2017 86*    Lymphocytes Relative 10/30/2017 9*    Monocytes Relative 10/30/2017 5     Eosinophils Relative 10/30/2017 0     Basophils Relative 10/30/2017 0     Neutrophils Absolute 10/30/2017 18 25*    Lymphocytes Absolute 10/30/2017 1 86     Monocytes Absolute 10/30/2017 1 12     Eosinophils Absolute 10/30/2017 0 03     Basophils Absolute 10/30/2017 0 06     Sodium 10/30/2017 127*    Potassium 10/30/2017 3 8     Chloride 10/30/2017 90*    CO2 10/30/2017 27     Anion Gap 10/30/2017 10     BUN 10/30/2017 8     Creatinine 10/30/2017 0 95     Glucose 10/30/2017 475*    Calcium 10/30/2017 8 6     eGFR 10/30/2017 90     LACTIC ACID 10/30/2017 2 9*     Imaging: I have personally reviewed pertinent films in PACS  EKG, Pathology, and Other Studies: I have personally reviewed pertinent reports        Code Status: Level 1 - Full Code  Advance Directive and Living Will:      Power of :    POLST: Portions of the record may have been created with voice recognition software  Occasional wrong word or "sound a like" substitutions may have occurred due to the inherent limitations of voice recognition software  Read the chart carefully and recognize, using context, where substitutions have occurred

## 2017-10-30 NOTE — PLAN OF CARE
Problem: Potential for Falls  Goal: Patient will remain free of falls  INTERVENTIONS:  - Assess patient frequently for physical needs  -  Identify cognitive and physical deficits and behaviors that affect risk of falls    -  [place] fall precautions as indicated by assessment   - Educate patient/family on patient safety including physical limitations  - Instruct patient to call for assistance with activity based on assessment  - Modify environment to reduce risk of injury  - Consider OT/PT consult to assist with strengthening/mobility   Outcome: Progressing

## 2017-10-30 NOTE — ED NOTES
-- initial sepsis eval --  Pt with soft tissue infection - of left great toe/foot-  Pt with 2 sirs criteria-  Elevated lactic acid level of 2 9- slim and podiatry paged-  Pt  With 3 l ns written for- vanco/cefipiem given --  Case d/w slim and podiatry for admit -- after pt admitted cc pa contacted  To be made aware     -  Pt  Has repeat lactic acid pending and 30 ml/kg fluid bolus ongoing- slim aware of this    - iman cc pa made aware of severe sepsis after slim admit by er md Maria Luz Cortez MD  10/30/17 1640

## 2017-10-31 PROBLEM — L08.9 DIABETIC FOOT INFECTION (HCC): Status: ACTIVE | Noted: 2017-10-30

## 2017-10-31 PROBLEM — M86.172 ACUTE OSTEOMYELITIS OF TOE, LEFT (HCC): Status: ACTIVE | Noted: 2017-10-30

## 2017-10-31 PROBLEM — A48.0 GAS GANGRENE OF FOOT (HCC): Status: RESOLVED | Noted: 2017-10-30 | Resolved: 2017-10-31

## 2017-10-31 PROBLEM — E11.628 DIABETIC FOOT INFECTION (HCC): Status: RESOLVED | Noted: 2017-10-30 | Resolved: 2017-10-31

## 2017-10-31 PROBLEM — E11.628 DIABETIC FOOT INFECTION (HCC): Status: ACTIVE | Noted: 2017-10-30

## 2017-10-31 PROBLEM — M86.172 ACUTE OSTEOMYELITIS OF TOE, LEFT (HCC): Status: RESOLVED | Noted: 2017-10-30 | Resolved: 2017-10-31

## 2017-10-31 PROBLEM — E87.1 HYPONATREMIA: Status: ACTIVE | Noted: 2017-10-31

## 2017-10-31 PROBLEM — L08.9 DIABETIC FOOT INFECTION (HCC): Status: RESOLVED | Noted: 2017-10-30 | Resolved: 2017-10-31

## 2017-10-31 LAB
ANION GAP SERPL CALCULATED.3IONS-SCNC: 9 MMOL/L (ref 4–13)
BASOPHILS # BLD AUTO: 0.04 THOUSANDS/ΜL (ref 0–0.1)
BASOPHILS NFR BLD AUTO: 0 % (ref 0–1)
BUN SERPL-MCNC: 6 MG/DL (ref 5–25)
CALCIUM SERPL-MCNC: 8 MG/DL (ref 8.3–10.1)
CHLORIDE SERPL-SCNC: 100 MMOL/L (ref 100–108)
CO2 SERPL-SCNC: 24 MMOL/L (ref 21–32)
CREAT SERPL-MCNC: 0.69 MG/DL (ref 0.6–1.3)
EOSINOPHIL # BLD AUTO: 0.04 THOUSAND/ΜL (ref 0–0.61)
EOSINOPHIL NFR BLD AUTO: 0 % (ref 0–6)
ERYTHROCYTE [DISTWIDTH] IN BLOOD BY AUTOMATED COUNT: 11.8 % (ref 11.6–15.1)
EST. AVERAGE GLUCOSE BLD GHB EST-MCNC: 249 MG/DL
GFR SERPL CREATININE-BSD FRML MDRD: 108 ML/MIN/1.73SQ M
GLUCOSE SERPL-MCNC: 150 MG/DL (ref 65–140)
GLUCOSE SERPL-MCNC: 160 MG/DL (ref 65–140)
GLUCOSE SERPL-MCNC: 160 MG/DL (ref 65–140)
GLUCOSE SERPL-MCNC: 214 MG/DL (ref 65–140)
GLUCOSE SERPL-MCNC: 238 MG/DL (ref 65–140)
GLUCOSE SERPL-MCNC: 249 MG/DL (ref 65–140)
HBA1C MFR BLD: 10.3 % (ref 4.2–6.3)
HCT VFR BLD AUTO: 33.5 % (ref 36.5–49.3)
HGB BLD-MCNC: 11.3 G/DL (ref 12–17)
LYMPHOCYTES # BLD AUTO: 2.4 THOUSANDS/ΜL (ref 0.6–4.47)
LYMPHOCYTES NFR BLD AUTO: 17 % (ref 14–44)
MCH RBC QN AUTO: 28.8 PG (ref 26.8–34.3)
MCHC RBC AUTO-ENTMCNC: 33.7 G/DL (ref 31.4–37.4)
MCV RBC AUTO: 86 FL (ref 82–98)
MONOCYTES # BLD AUTO: 0.87 THOUSAND/ΜL (ref 0.17–1.22)
MONOCYTES NFR BLD AUTO: 6 % (ref 4–12)
NEUTROPHILS # BLD AUTO: 11.11 THOUSANDS/ΜL (ref 1.85–7.62)
NEUTS SEG NFR BLD AUTO: 77 % (ref 43–75)
PLATELET # BLD AUTO: 492 THOUSANDS/UL (ref 149–390)
PMV BLD AUTO: 8.3 FL (ref 8.9–12.7)
POTASSIUM SERPL-SCNC: 3.6 MMOL/L (ref 3.5–5.3)
RBC # BLD AUTO: 3.92 MILLION/UL (ref 3.88–5.62)
SODIUM SERPL-SCNC: 133 MMOL/L (ref 136–145)
WBC # BLD AUTO: 14.46 THOUSAND/UL (ref 4.31–10.16)

## 2017-10-31 PROCEDURE — 80048 BASIC METABOLIC PNL TOTAL CA: CPT | Performed by: PHYSICIAN ASSISTANT

## 2017-10-31 PROCEDURE — 83036 HEMOGLOBIN GLYCOSYLATED A1C: CPT | Performed by: HOSPITALIST

## 2017-10-31 PROCEDURE — 85025 COMPLETE CBC W/AUTO DIFF WBC: CPT | Performed by: PHYSICIAN ASSISTANT

## 2017-10-31 PROCEDURE — 82948 REAGENT STRIP/BLOOD GLUCOSE: CPT

## 2017-10-31 RX ORDER — TRAMADOL HYDROCHLORIDE 50 MG/1
50 TABLET ORAL EVERY 6 HOURS PRN
Status: DISCONTINUED | OUTPATIENT
Start: 2017-10-31 | End: 2017-11-07 | Stop reason: HOSPADM

## 2017-10-31 RX ORDER — MORPHINE SULFATE 2 MG/ML
2 INJECTION, SOLUTION INTRAMUSCULAR; INTRAVENOUS EVERY 6 HOURS PRN
Status: DISCONTINUED | OUTPATIENT
Start: 2017-10-31 | End: 2017-11-07 | Stop reason: HOSPADM

## 2017-10-31 RX ORDER — OXYCODONE HYDROCHLORIDE 5 MG/1
5 TABLET ORAL EVERY 4 HOURS PRN
Status: DISCONTINUED | OUTPATIENT
Start: 2017-10-31 | End: 2017-11-07 | Stop reason: HOSPADM

## 2017-10-31 RX ORDER — ACETAMINOPHEN 325 MG/1
650 TABLET ORAL EVERY 6 HOURS SCHEDULED
Status: DISCONTINUED | OUTPATIENT
Start: 2017-10-31 | End: 2017-11-07 | Stop reason: HOSPADM

## 2017-10-31 RX ADMIN — OXYCODONE HYDROCHLORIDE 5 MG: 5 TABLET ORAL at 20:09

## 2017-10-31 RX ADMIN — SODIUM CHLORIDE 125 ML/HR: 0.9 INJECTION, SOLUTION INTRAVENOUS at 18:41

## 2017-10-31 RX ADMIN — INSULIN LISPRO 2 UNITS: 100 INJECTION, SOLUTION INTRAVENOUS; SUBCUTANEOUS at 09:17

## 2017-10-31 RX ADMIN — PIPERACILLIN SODIUM,TAZOBACTAM SODIUM 4.5 G: 4; .5 INJECTION, POWDER, FOR SOLUTION INTRAVENOUS at 21:43

## 2017-10-31 RX ADMIN — PIPERACILLIN SODIUM,TAZOBACTAM SODIUM 4.5 G: 4; .5 INJECTION, POWDER, FOR SOLUTION INTRAVENOUS at 16:50

## 2017-10-31 RX ADMIN — INSULIN LISPRO 1 UNITS: 100 INJECTION, SOLUTION INTRAVENOUS; SUBCUTANEOUS at 12:59

## 2017-10-31 RX ADMIN — INSULIN LISPRO 14 UNITS: 100 INJECTION, SOLUTION INTRAVENOUS; SUBCUTANEOUS at 16:53

## 2017-10-31 RX ADMIN — INSULIN LISPRO 14 UNITS: 100 INJECTION, SOLUTION INTRAVENOUS; SUBCUTANEOUS at 09:16

## 2017-10-31 RX ADMIN — INSULIN LISPRO 1 UNITS: 100 INJECTION, SOLUTION INTRAVENOUS; SUBCUTANEOUS at 21:43

## 2017-10-31 RX ADMIN — ENOXAPARIN SODIUM 40 MG: 40 INJECTION SUBCUTANEOUS at 09:16

## 2017-10-31 RX ADMIN — PIPERACILLIN SODIUM,TAZOBACTAM SODIUM 4.5 G: 4; .5 INJECTION, POWDER, FOR SOLUTION INTRAVENOUS at 04:31

## 2017-10-31 RX ADMIN — PIPERACILLIN SODIUM,TAZOBACTAM SODIUM 4.5 G: 4; .5 INJECTION, POWDER, FOR SOLUTION INTRAVENOUS at 11:01

## 2017-10-31 RX ADMIN — VANCOMYCIN HYDROCHLORIDE 1500 MG: 1 INJECTION, POWDER, LYOPHILIZED, FOR SOLUTION INTRAVENOUS at 01:38

## 2017-10-31 RX ADMIN — INSULIN GLARGINE 40 UNITS: 100 INJECTION, SOLUTION SUBCUTANEOUS at 21:43

## 2017-10-31 RX ADMIN — LISINOPRIL 10 MG: 10 TABLET ORAL at 09:16

## 2017-10-31 RX ADMIN — VANCOMYCIN HYDROCHLORIDE 1500 MG: 1 INJECTION, POWDER, LYOPHILIZED, FOR SOLUTION INTRAVENOUS at 12:59

## 2017-10-31 RX ADMIN — INSULIN LISPRO 1 UNITS: 100 INJECTION, SOLUTION INTRAVENOUS; SUBCUTANEOUS at 16:53

## 2017-10-31 RX ADMIN — INSULIN LISPRO 14 UNITS: 100 INJECTION, SOLUTION INTRAVENOUS; SUBCUTANEOUS at 12:59

## 2017-10-31 NOTE — ASSESSMENT & PLAN NOTE
· Patient denies any insulin use at home, has only been on metformin BID  · Reports checking his blood sugar every so often and is usually high, 200-300s   · On SSI, lantus 40 units QD and humalog 14 with meals here  · Most recent blood sugar 160  · Consult endocrinology based on patient's sugars continuing to be high despite aggressive insulin regime, will likely need a new home regime for his diabetes when discharged  · HgbA1C 10 3

## 2017-10-31 NOTE — PROGRESS NOTES
Progress Note - Podiatry  Rabia Nolen 45 y o  male MRN: [id number]  Unit/Bed#: -01 Encounter: [id number]    Assessment  1  Left foot gas gangrene with severe sepsis POD #1 open hallux amputation  2  Left hallux osteomyelitis  3  Left foot cellulitis - improving  4  DM neuropathy    Plan:  1  Sepsis seems to be resolving well  SEE PICTURE BELOW  Patient is hemodynamically stable this morning  His white blood cell count went from 21,000 to 14,000 and the 6 hours after surgery  This is a good sign and should continue to improve  I do not appreciate any further infection in his foot today appears very stable  At this point the patient will need a secondary procedure later this week on Thursday or Friday  He will had minimal require a 1st ray amputation however there is a high chance that he will need a transmetatarsal amputation both for definitive surgical resection infection and to have enough soft tissue for closure of the wound  He is aware of this risk  2   Broad-spectrum IV antibiotics for now  Agree with aggressive vanc Zosyn which can be tailored as wound cultures return  I did take deep aerobic and anaerobic cultures last night in the OR  Those cultures are currently pending  Subjective/Objective   Chief Complaint:   Chief Complaint   Patient presents with    Toe Injury - Major     c/o left great toe and toenail black with odorous drainage noted  Also left foot and ankle swelling and increased blood sugars x 1 week  Subjective: 45 y o  y/o male seen and evaluated at bedside  No acute events overnight  Denies N/F/V/SOB/CP/cough/diarrhea/constipation  No pain to left lower extremity  Dressing is clean dry and intact  Blood pressure 137/75, pulse 87, temperature 98 8 °F (37 1 °C), temperature source Oral, resp  rate 18, height 6' 3" (1 905 m), weight 107 kg (236 lb 8 9 oz), SpO2 91 %  ,Body mass index is 29 57 kg/m²      Lab Results   Component Value Date    WBC 14 46 (H) 10/31/2017    HGB 11 3 (L) 10/31/2017    HCT 33 5 (L) 10/31/2017    MCV 86 10/31/2017     (H) 10/31/2017     Lab Results   Component Value Date    GLUCOSE 238 (H) 10/31/2017    CALCIUM 8 0 (L) 10/31/2017     (L) 10/31/2017    K 3 6 10/31/2017    CO2 24 10/31/2017     10/31/2017    BUN 6 10/31/2017    CREATININE 0 69 10/31/2017         Invasive Devices     Peripheral Intravenous Line            Peripheral IV 10/30/17 Right Antecubital less than 1 day    Peripheral IV 10/30/17 Right Forearm less than 1 day                Physical Exam:   General: alert, cooperative and no distress  Lungs: Normal respiratory effort, LCTABL  Heart: regular rate and rhythm   Abdomen: soft, non-tender  Positive bowel sounds  Extremity:  The left hallux has been amputated with the 1st metatarsal head exposed from the wound  This cellulitis is significantly improved from yesterday's presentation  There is minimal malodor this morning  On unable to express any purulence from the plantar medial dorsal aspect of the foot  There is minimal necrotic tissue within the wound but overall appears very stable  Cap refill time to remaining digits is within normal limits  Palpable pedal pulses  Ankle range of motion intact  No calf pain with compression  Lab, Imaging and other studies:   Postop x-ray shows resolved gas gangrene infection  The 1st metatarsals extending out of the wound  Appears to be stable postop exam showing hallux amputation and sesamoidectomy  I do not appreciate any ascending soft tissue emphysema  Imaging: I have personally reviewed pertinent films in PACS  EKG, Pathology, and Other Studies: I have personally reviewed pertinent reports  Portions of the record may have been created with voice recognition software  Occasional wrong word or "sound a like" substitutions may have occurred due to the inherent limitations of voice recognition software   Read the chart carefully and recognize, using context, where substitutions have occurred

## 2017-10-31 NOTE — ASSESSMENT & PLAN NOTE
· POA with leukocytosis, tachycardia and elevated lactic acid  · Improving, blood cultures pending  · WBC improved to 14 46  · Elevated lactic acid resolved, 0 9  · Non-tachy   · Currently on vanco and zosyn for gangrene toe, continue antibiotics and trend patient's symptoms

## 2017-10-31 NOTE — ASSESSMENT & PLAN NOTE
· XR left foot 3+ views 10/30 - Extensive subcutaneous air noted at the 1st toe extending to the 1st metatarsal region along with soft tissue swelling  Correlate for gas gangrene  Comminuted fracture at the 1st distal phalanx proximally   Fracture margins are ill-defined, underlying osteomyelitis is not excluded      · Pt was taken to the OR yesterday for open hallux amputation d/t left hallux gangrene  · Podiatry and ID following, pt will likely need second procedure towards end of the week   · Currently on vancomycin and zosyn per Id, follow wound cultures for additional changes to antibiotics  · Vanc trough before 4th dose if vanco still on board tomorrow   · Local wound care  · Continue IVF  · Blood cultures pending

## 2017-10-31 NOTE — CASE MANAGEMENT
Initial Clinical Review    Admission: Date/Time/Statement: 10/30/17 @ 1302     Orders Placed This Encounter   Procedures    Inpatient Admission (expected length of stay for this patient is greater than two midnights)     Standing Status:   Standing     Number of Occurrences:   1     Order Specific Question:   Admitting Physician     Answer:   Beth Sun [[id number]]     Order Specific Question:   Level of Care     Answer:   Med Surg [[id number]]     Order Specific Question:   Estimated length of stay     Answer:   More than 2 Midnights     Order Specific Question:   Certification     Answer:   I certify that inpatient services are medically necessary for this patient for a duration of greater than two midnights  See H&P and MD Progress Notes for additional information about the patient's course of treatment  ED: Date/Time/Mode of Arrival:   ED Arrival Information     Expected Arrival Acuity Means of Arrival Escorted By Service Admission Type    - 10/30/2017 10:53 Emergent Walk-In Self General Medicine Emergency    Arrival Complaint    Toe Infection          Chief Complaint:   Chief Complaint   Patient presents with    Toe Injury - Major     c/o left great toe and toenail black with odorous drainage noted  Also left foot and ankle swelling and increased blood sugars x 1 week  History of Illness:  45 y o  male with a history of Type 2 Diabetes Mellitus who presents with a left great toe infection  The patient reports that this has been going on for the last 1 5 weeks  He states that it started as what looked like a "callus" on the bottom part of his foot and rapidly progressed to a draining, black wound  He states that 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 and he had been wearing boots and ambulating on the foot for long hours during the day  He states that a few days ago the foot started to have a bad odor to the point where his colleague had to step out of the vehicle due to the smell   He denies pain to the area and states that he does have a history of neuropathy  He reports some mild chills  Denies any other wounds active currently, however he does state that he had a prior ulceration on his right calf that he was able to heal on his own  He denies fevers, chest pain,pressure, difficulty breathing, or difficulty swallowing      Of note the patient states that he does follow with a PCP, but states that he was only ever placed on Metformin for his diabetes  He is unsure of his last A1c  He states that he checks his sugars at home and runs between 160 to 260  He states that he does see an ophthalmologist regularly and he has documented diabetic retinopathy  ED Vital Signs:   ED Triage Vitals [10/30/17 1105]   Temperature Pulse Respirations Blood Pressure SpO2   99 1 °F (37 3 °C) (!) 131 20 (!) 175/100 97 %      Temp Source Heart Rate Source Patient Position - Orthostatic VS BP Location FiO2 (%)   Oral Monitor Sitting Left arm --      Pain Score       No Pain        Wt Readings from Last 1 Encounters:   10/30/17 107 kg (236 lb 8 9 oz)       Vital Signs (abnormal):   Date and Time Temp Pulse SpO2 Resp BP   10/30/17 1330 --  108 91 % 14 154/79   10/30/17 1300 -- 104 95 % 14 140/73   10/30/17 1230 --  110 91 % 14 140/86     Abnormal Labs/Diagnostic Test Results: Lactic Acid 2 9, Na 127, Cl 90, Glucose 475, WBC 21 32, Platelets 777, Neutrophils PCT 86    Left toes Xray: 1   Extensive subcutaneous air noted at the 1st toe extending to the 1st metatarsal region along with soft tissue swelling   Correlate for gas gangrene  2    Comminuted fracture at the 1st distal phalanx proximally   Fracture margins are ill-defined, underlying osteomyelitis is not excluded        ED Treatment:   Medication Administration from 10/30/2017 1053 to 10/30/2017 1411       Date/Time Order Dose Route Action Action by Comments     10/30/2017 1335 sodium chloride 0 9 % bolus 1,000 mL 0 mL Intravenous Stopped Genet Mcgarry RN 10/30/2017 1235 sodium chloride 0 9 % bolus 1,000 mL 1,000 mL Intravenous [address] Matt Garcia RN      10/30/2017 1305 vancomycin (VANCOCIN) 1,500 mg in sodium chloride 0 9 % 250 mL IVPB 1,500 mg Intravenous [address] Matt Garcia RN      10/30/2017 1304 cefepime (MAXIPIME) 2 g/50 mL dextrose IVPB 0 mg Intravenous Stopped Matt Garcia RN      10/30/2017 1235 cefepime (MAXIPIME) 2 g/50 mL dextrose IVPB 2,000 mg Intravenous [address] Matt Garcia RN      10/30/2017 1333 sodium chloride 0 9 % bolus 1,000 mL 1,000 mL Intravenous [address] Matt Garcia RN      10/30/2017 1332 sodium chloride 0 9 % bolus 1,000 mL 1,000 mL Intravenous New Bag Matt Garcia RN         Physical Exam   Constitutional: He is oriented to person, place, and time  Vital signs are normal  He appears well-developed and well-nourished  Non-toxic appearance  No distress  Cardiovascular: Normal rate, regular rhythm, S1 normal, S2 normal, normal heart sounds and intact distal pulses  Exam reveals no S3 and no S4  No murmur heard  Pulmonary/Chest: Effort normal and breath sounds normal  No accessory muscle usage  No respiratory distress  He has no decreased breath sounds  He has no wheezes  He has no rhonchi  He has no rales  He exhibits no tenderness  Neurological: He is alert and oriented to person, place, and time  A sensory deficit (sensory deficit in stocking formation of the bilateral lower extremity up to the mid-calf) is present  Skin: Skin is warm and dry  Past Medical/Surgical History:    Active Ambulatory Problems     Diagnosis Date Noted    No Active Ambulatory Problems     Resolved Ambulatory Problems     Diagnosis Date Noted    No Resolved Ambulatory Problems     Past Medical History:   Diagnosis Date    Diabetes mellitus ([facility] )     Hypertension        Admitting Diagnosis: Blister of toe with infection [S90 426A, L08 9]  Cellulitis of left foot [L03 116]  Diabetic foot infection ([facility] ) [E11 69, L08 9]  Acute osteomyelitis of toe, left ([facility]) [M86 172]  Diabetic ulcer of toe of left foot associated with diabetes mellitus due to underlying condition, with necrosis of muscle ([facility] ) [Q00 269, L98 523]    Age/Sex: 45 y o  male    Assessment/Plan:     Hospital Problem List:      Principal Problem:    Diabetic ulcer of left foot associated with diabetes mellitus due to underlying condition, with necrosis of muscle ([place] [address] )  Active Problems:    Essential hypertension    Type 2 diabetes mellitus with foot ulcer, without long-term current use of insulin ([facility])    Severe sepsis ([facility] )        Plan for the Primary Problem(s):  · Diabetic Ulcer of Left Foot Associated with Diabetes Mellitus due to underlying condition, with necrosis of muscle   ? Admit patient to med/surgical under inpatient status with telemetry monitoring given sepsis   ? POA: malodorous left great toe with erythema extending to mid foot circumferentially as well as edema up the left calf, leukocytosis, tachycardia, lactic acid 2 9 resulting in severe sepsis criteria, gas noted on x-ray   ? Spoke with infectious disease physician about preliminary antibiotics recommendations   ? Zosyn 3 375 g IV Q6  ? Vancomycin 15 mg/kg Q12   ? Consult infectious disease for further management   ? Monitor renal function closely   ? Consult podiatry   ? Wound culture  ? NPO now given may need procedure today   ? Monitor lactic acid   ? Trend temps, cbc, bmp   · Severe Sepsis  ? POA: tachycardia, leukocytosis, source of infection, and lactic acid = 2 9  ? Received sepsis fluid bolus in ED  ? Trend lactic acid   ? Antibiotics and physician consults as above      Plan for Additional Problems:   · Type 2 Diabetes Mellitus with skin complication without long term current use of insulin   ? BG on admission = 476   ? Hold Metformin   ? Lantus 40 U QHS  ? Hold mealtime insulin given NPO  ?  SSI insulin Q6 PRN hyperglycemia for now      VTE Prophylaxis: Enoxaparin (Lovenox)  / sequential compression device   Code Status: Full code   POLST: POLST form is not discussed and not completed at this time      Anticipated Length of Stay:  Patient will be admitted on an Inpatient basis with an anticipated length of stay of  Less than 2 midnights     Justification for Hospital Stay: IV abx, podiatry eval and surgery, severe sepsis     Admission Orders:  Inpatient/Tele  Continuous Cardiac Monitoring  Consult Podiatry r/e acute osteomyelitis of left toe, cellulitis of left foot  OP Note: Guillotine Left hallux amputation with irrigation and drainage   Consult ID r/e diabetic ulcer left foot, necrosis  Bilateral Sequential Compression Device  PT Consult  SSI  NSS @ 125    Scheduled Meds:   enoxaparin 40 mg Subcutaneous Daily   insulin glargine 40 Units Subcutaneous HS   insulin lispro 1-6 Units Subcutaneous TID AC   insulin lispro 1-6 Units Subcutaneous HS   insulin lispro 14 Units Subcutaneous TID With Meals   lisinopril 10 mg Oral Daily   nicotine 1 patch Transdermal Daily   piperacillin-tazobactam 4 5 g Intravenous Q6H   vancomycin 15 mg/kg Intravenous Q12H     IV Zofran 4 mg x 2 thus far

## 2017-10-31 NOTE — PROGRESS NOTES
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  Fortunately thus far the patient has remained hemodynamically stable and it appears that the infection has remained confined to the forefoot  He is now status post guillotine amputation of the left hallux   -continue vancomycin and Zosyn for now at current dose  -if still needs vancomycin tomorrow, will need a vancomycin trough checked  -follow-up wound culture  -local wound care  -close Podiatry follow-up  -for additional surgery later this week  -serial exams  -monitor CBC with diff and creatinine     2  Severe sepsis-POA  Leukocytosis and tachycardia with elevated lactate  Fortunately the patient remains hemodynamically stable  Appears all secondary to 1  No other clear source appreciated  Consideration for the possibility of bacteremia  The sepsis appears to be improving   -antibiotics as above  -follow-up blood cultures  -supportive care  -no additional ID workup for now     3  Diabetes mellitus-type 2 with hyperglycemia  Very poorly controlled diabetes at this time   -tighten glucose control as per the primary service     4  Hyponatremia-likely pseudohyponatremia secondary to the hyperglycemia  Improved with better glucose control   -monitor BMP    Antibiotics:  Vancomycin and Zosyn 2  Postop day 1  Subjective:  Patient has no fever, chills, sweats; no nausea, vomiting, diarrhea; no cough, shortness of breath; no pain  No new symptoms  The patient underwent guillotine left hallux amputation with irrigation and drainage last night      Objective:  Vitals:  HR:  [] 89  Resp:  [14-20] 18  BP: (128-175)/() 128/79  SpO2:  [91 %-97 %] 92 %  Temp (24hrs), Av 8 °F (37 1 °C), Min:97 7 °F (36 5 °C), Max:100 °F (37 8 °C)  Current: Temperature: 98 5 °F (36 9 °C)    Physical Exam:   General Appearance:  Alert, nontoxic, no acute distress  Throat: Oropharynx moist without lesions  Lungs:   Clear to auscultation bilaterally; respirations unlabored   Heart:  RRR; no murmur, rub or gallop   Abdomen:   Soft, non-tender, non-distended, positive bowel sounds  Extremities: No clubbing, cyanosis  Left foot heavily dressed status post hallux amputation   Skin: No new rashes or lesions  No draining wounds noted         Labs, Imaging, & Other studies:   All pertinent labs and imaging studies were personally reviewed    Results from last 7 days  Lab Units 10/31/17  0441 10/30/17  1215   WBC Thousand/uL 14 46* 21 32*   HEMOGLOBIN g/dL 11 3* 13 8   PLATELETS Thousands/uL 492* 563*       Results from last 7 days  Lab Units 10/31/17  0441 10/30/17  1215   SODIUM mmol/L 133* 127*   POTASSIUM mmol/L 3 6 3 8   CHLORIDE mmol/L 100 90*   CO2 mmol/L 24 27   ANION GAP mmol/L 9 10   BUN mg/dL 6 8   CREATININE mg/dL 0 69 0 95   EGFR ml/min/1 73sq m 108 90   GLUCOSE RANDOM mg/dL 238* 475*   CALCIUM mg/dL 8 0* 8 6

## 2017-10-31 NOTE — OP NOTE
OPERATIVE REPORT  PATIENT NAME: Chris Costello    :  1965  MRN: [id number]  Pt Location: AN OR ROOM 04    SURGERY DATE: 10/30/2017    Surgeon(s) and Role:     * Ed Weathers DPM - Primary     * Travis Shetty DPM - Assisting    Preop Diagnosis:  Severe sepsis ([address] ) [A41 9, R65 20]  Gas gangrene of foot ([address] ) [A48 0]    Post-Op Diagnosis Codes: * Severe sepsis ([address] ) [A41 9, R65 20]     * Gas gangrene of foot ([address] ) [A48 0]    Procedure:  Guillotine Left hallux amputation with irrigation and drainage    Specimen(s):  ID Type Source Tests Collected by Time Destination   1 : LEFT FOOT TMA Tissue Foot, Left TISSUE EXAM Ed Weathers DPM 10/30/2017 2011    A : LEFT FOOT TMA Wound Foot, Left ANAEROBIC CULTURE AND GRAM STAIN, WOUND CULTURE Ed Weathers DPM 10/30/2017 2009        Estimated Blood Loss:   Minimal    Drains:  none     Materials: Iodoform packing    Anesthesia Type:   TIVA with 20 cc Local 1:1 mix 1% lidocaine plain and 0 55 marcaine plain    Operative Indications:  Severe sepsis ([address] ) [A41 9, R65 20]  Gas gangrene of foot ([address] ) [A48 0]      Operative Findings:  After disarticulation of left hallux at the level of the MPJ and removal of sesamoid, remaining metatarsal head appears to be firm and viable  Soft tissues remaining after removal of necrotic tissue is bleeding an viable  Incision left open with iodoform packing for drainage and plan for further amputation later in the week  Complications:   None    Procedure and Technique:  Under mild sedation, the patient was brought into the operating room and placed on the operating room table in the supine position  A time out was performed to confirm the correct patient, procedure and site with all parties in agreement  Following IV sedation, local anesthetic was obtained about the patient's left foot was performed consisting of 20 ml of 1% Lidocaine and 0 5% Bupivacaine in a 1:1 mixture   The foot was then scrubbed, prepped and draped in the usual aseptic manner  Attention was directed to the 1 digit where a fishmouth type of incision was made  Utilizing a sterile 15 blade, this incision was carried deep straight to bone  Soft tissue structures were then reflected off the proximal phalanx and metatarsal head  The severed digit was then passed off to the back table  The sesamoids were then excised off the metatarsal as the tissue surrounding them was necrotic  It was noted that all tissue margins were bleeding and viable  No deep sinus tracts or areas of purulence were visualized  The remaining bone on the proximal aspect of the cut was noted to be of hard and viable quality  Any remaining tendinous structures were identified and severed proximally to remove any nidus of infection  The wound was then cleansed with copious amount of sterile saline  Wound was left open and packed with iodoform packing  Wound dressed with sterile 4x4s, ABD, kerlix and ACE  The patient tolerated the procedure and anesthesia well and was transported to the PACU with vital signs stable       Patient Disposition:  hemodynamically stable    SIGNATURE: Maddi Collazo DPM  DATE: October 30, 2017  TIME: 8:24 PM

## 2017-10-31 NOTE — ASSESSMENT & PLAN NOTE
· Likely pseudohyponatremia from hyperglycemia   · Improved from 127 to 133 with glucose control  · Continue IVF, monitor BMP  · Corrected sodium 135

## 2017-10-31 NOTE — PROGRESS NOTES
Progress Note - Trace Manning 45 y o  male MRN: [id number]    Unit/Bed#: -01 Encounter: [id number]      DOS: 10/31/2017      * Diabetic ulcer of left foot associated with diabetes mellitus due to underlying condition, with necrosis of muscle ([facility] )   Assessment & Plan    · XR left foot 3+ views 10/30 - Extensive subcutaneous air noted at the 1st toe extending to the 1st metatarsal region along with soft tissue swelling  Correlate for gas gangrene  Comminuted fracture at the 1st distal phalanx proximally   Fracture margins are ill-defined, underlying osteomyelitis is not excluded      · Pt was taken to the OR yesterday for open hallux amputation d/t left hallux gangrene  · Podiatry and ID following, pt will likely need second procedure towards end of the week   · Currently on vancomycin and zosyn per Id, follow wound cultures for additional changes to antibiotics  · Vanc trough before 4th dose if vanco still on board tomorrow   · Local wound care  · Continue IVF  · Blood cultures pending        Severe sepsis ([place] [id number] )   Assessment & Plan    · POA with leukocytosis, tachycardia and elevated lactic acid  · Improving, blood cultures pending  · WBC improved to 14 46  · Elevated lactic acid resolved, 0 9  · Non-tachy   · Currently on vanco and zosyn for gangrene toe, continue antibiotics and trend patient's symptoms          Type 2 diabetes mellitus with foot ulcer, without long-term current use of insulin ([facility] )   Assessment & Plan    · Patient denies any insulin use at home, has only been on metformin BID  · Reports checking his blood sugar every so often and is usually high, 200-300s   · On SSI, lantus 40 units QD and humalog 14 with meals here  · Most recent blood sugar 160  · Consult endocrinology based on patient's sugars continuing to be high despite aggressive insulin regime, will likely need a new home regime for his diabetes when discharged and need for follow up  · HgbA1C 10 3          Hyponatremia Assessment & Plan    · Likely pseudohyponatremia from hyperglycemia   · Improved from 127 to 133 with glucose control  · Continue IVF, monitor BMP  · Corrected sodium 135        Essential hypertension   Assessment & Plan    · Most recent /79  · Continue lisinopril 10 mg   · Continue to monitor BP trends            VTE Pharmacologic Prophylaxis:   Pharmacologic: Enoxaparin (Lovenox)  Mechanical VTE Prophylaxis in Place: Yes    Patient Centered Rounds: I have evaluated patient without nursing staff present due to speaking with the nurse outside patient's room    Discussions with Specialists or Other Care Team Provider: Reviewed podiatry and ID notes    Education and Discussions with Family / Patient: Patient, when asked to update family patient politely declined    Time Spent for Care: 30 minutes  More than 50% of total time spent on counseling and coordination of care as described above  Current Length of Stay: 1 day(s)    Current Patient Status: Inpatient   Certification Statement: The patient will continue to require additional inpatient hospital stay due to gas gangrene of left hallux, possible second surgery scheduled for end of the week    Discharge Plan: Pending due to patient being on IV antibiotics and possible second surgery at end of the week    Code Status: Level 1 - Full Code      Subjective:   Patient is a 45year old male with past medical history of uncontrolled type 2 diabetes mellitus  Patient states that he has never been on insulin outpatient for his diabetes and has only taken metformin  Reports taking his blood sugars not very often but when he does they usually read in the 200-300s asymptomatically  Presented with diabetic foot ulcer and gangrenous left hallux  He is POD#1 left hallux amputation  He reports that his foot is not painful  He has no complaints at this time and currently denies dizziness, chest pain, shortness of breath, abdominal pain, nausea, vomiting and diarrhea  Also denies any problems with urination  Objective:     Vitals:   Temp (24hrs), Av 8 °F (37 1 °C), Min:97 7 °F (36 5 °C), Max:100 °F (37 8 °C)    HR:  [] 89  Resp:  [16-20] 18  BP: (128-157)/(72-85) 128/79  SpO2:  [91 %-95 %] 92 %  Body mass index is 29 57 kg/m²  Input and Output Summary (last 24 hours): Intake/Output Summary (Last 24 hours) at 10/31/17 1428  Last data filed at 10/30/17 2101   Gross per 24 hour   Intake           802 08 ml   Output                0 ml   Net           802 08 ml       Physical Exam:     Physical Exam   Constitutional: He appears well-developed and well-nourished  No distress  Pt is in no acute distress and lying quietly on his hospital bed   HENT:   Head: Normocephalic and atraumatic  Cardiovascular: Normal rate and regular rhythm  Pulmonary/Chest: Effort normal and breath sounds normal  No respiratory distress  He has no wheezes  Abdominal: Soft  Bowel sounds are normal  He exhibits no distension  There is no tenderness  There is no rebound  Musculoskeletal: He exhibits edema  Mild edema without erythema of the left lower extremity up to mid-shin with foot and ankle in bulky wrap following hallux amputation  Dressing is clean, dry and intact   Neurological: He is alert  Skin: Skin is warm and dry  No erythema  Vitals reviewed  Additional Data:     Labs:      Results from last 7 days  Lab Units 10/31/17  0441   WBC Thousand/uL 14 46*   HEMOGLOBIN g/dL 11 3*   HEMATOCRIT % 33 5*   PLATELETS Thousands/uL 492*   NEUTROS PCT % 77*   LYMPHS PCT % 17   MONOS PCT % 6   EOS PCT % 0       Results from last 7 days  Lab Units 10/31/17  0441   SODIUM mmol/L 133*   POTASSIUM mmol/L 3 6   CHLORIDE mmol/L 100   CO2 mmol/L 24   BUN mg/dL 6   CREATININE mg/dL 0 69   CALCIUM mg/dL 8 0*   GLUCOSE RANDOM mg/dL 238*           * I Have Reviewed All Lab Data Listed Above  * Additional Pertinent Lab Tests Reviewed:  [facility]    Imaging:    Imaging Reports Reviewed Today Include: XR left foot 3+ views (10/30 & 10/31)  Imaging Personally Reviewed by Myself Includes:  None    Recent Cultures (last 7 days):       Results from last 7 days  Lab Units 10/30/17  2009 10/30/17  7544   GRAM STAIN RESULT  No polys seen  4+ Gram negative rods  4+ Gram positive cocci in pairs, chains and clusters  1+ Gram positive rods 1+ Polys  4+ Gram negative rods  4+ Gram positive cocci in pairs, chains and clusters  3+ Gram positive rods   WOUND CULTURE  Culture too young- will reincubate Culture too young- will reincubate       Last 24 Hours Medication List:     enoxaparin 40 mg Subcutaneous Daily   insulin glargine 40 Units Subcutaneous HS   insulin lispro 1-6 Units Subcutaneous TID AC   insulin lispro 1-6 Units Subcutaneous HS   insulin lispro 14 Units Subcutaneous TID With Meals   lisinopril 10 mg Oral Daily   nicotine 1 patch Transdermal Daily   piperacillin-tazobactam 4 5 g Intravenous Q6H   vancomycin 15 mg/kg Intravenous Q12H        Today, Patient Was Seen By: Soha Maynard PA-C    ** Please Note: Dictation voice to text software may have been used in the creation of this document   **

## 2017-11-01 PROBLEM — D72.829 LEUKOCYTOSIS: Status: ACTIVE | Noted: 2017-10-30

## 2017-11-01 LAB
ANION GAP SERPL CALCULATED.3IONS-SCNC: 8 MMOL/L (ref 4–13)
BASOPHILS # BLD AUTO: 0.05 THOUSANDS/ΜL (ref 0–0.1)
BASOPHILS NFR BLD AUTO: 1 % (ref 0–1)
BUN SERPL-MCNC: 8 MG/DL (ref 5–25)
CALCIUM SERPL-MCNC: 8 MG/DL (ref 8.3–10.1)
CHLORIDE SERPL-SCNC: 102 MMOL/L (ref 100–108)
CO2 SERPL-SCNC: 26 MMOL/L (ref 21–32)
CREAT SERPL-MCNC: 0.74 MG/DL (ref 0.6–1.3)
EOSINOPHIL # BLD AUTO: 0.21 THOUSAND/ΜL (ref 0–0.61)
EOSINOPHIL NFR BLD AUTO: 2 % (ref 0–6)
ERYTHROCYTE [DISTWIDTH] IN BLOOD BY AUTOMATED COUNT: 11.9 % (ref 11.6–15.1)
GFR SERPL CREATININE-BSD FRML MDRD: 105 ML/MIN/1.73SQ M
GLUCOSE SERPL-MCNC: 135 MG/DL (ref 65–140)
GLUCOSE SERPL-MCNC: 179 MG/DL (ref 65–140)
GLUCOSE SERPL-MCNC: 192 MG/DL (ref 65–140)
GLUCOSE SERPL-MCNC: 192 MG/DL (ref 65–140)
GLUCOSE SERPL-MCNC: 232 MG/DL (ref 65–140)
HCT VFR BLD AUTO: 35.4 % (ref 36.5–49.3)
HGB BLD-MCNC: 11.8 G/DL (ref 12–17)
LYMPHOCYTES # BLD AUTO: 2.91 THOUSANDS/ΜL (ref 0.6–4.47)
LYMPHOCYTES NFR BLD AUTO: 27 % (ref 14–44)
MCH RBC QN AUTO: 28.8 PG (ref 26.8–34.3)
MCHC RBC AUTO-ENTMCNC: 33.3 G/DL (ref 31.4–37.4)
MCV RBC AUTO: 86 FL (ref 82–98)
MONOCYTES # BLD AUTO: 0.65 THOUSAND/ΜL (ref 0.17–1.22)
MONOCYTES NFR BLD AUTO: 6 % (ref 4–12)
NEUTROPHILS # BLD AUTO: 6.99 THOUSANDS/ΜL (ref 1.85–7.62)
NEUTS SEG NFR BLD AUTO: 64 % (ref 43–75)
PLATELET # BLD AUTO: 517 THOUSANDS/UL (ref 149–390)
PMV BLD AUTO: 8.4 FL (ref 8.9–12.7)
POTASSIUM SERPL-SCNC: 3.5 MMOL/L (ref 3.5–5.3)
RBC # BLD AUTO: 4.1 MILLION/UL (ref 3.88–5.62)
SODIUM SERPL-SCNC: 136 MMOL/L (ref 136–145)
VANCOMYCIN TROUGH SERPL-MCNC: 6.5 UG/ML (ref 10–20)
VANCOMYCIN TROUGH SERPL-MCNC: 8.7 UG/ML (ref 10–20)
WBC # BLD AUTO: 10.81 THOUSAND/UL (ref 4.31–10.16)

## 2017-11-01 PROCEDURE — 80202 ASSAY OF VANCOMYCIN: CPT | Performed by: PHYSICIAN ASSISTANT

## 2017-11-01 PROCEDURE — G8979 MOBILITY GOAL STATUS: HCPCS

## 2017-11-01 PROCEDURE — G8978 MOBILITY CURRENT STATUS: HCPCS

## 2017-11-01 PROCEDURE — 82948 REAGENT STRIP/BLOOD GLUCOSE: CPT

## 2017-11-01 PROCEDURE — 80202 ASSAY OF VANCOMYCIN: CPT | Performed by: INTERNAL MEDICINE

## 2017-11-01 PROCEDURE — 97163 PT EVAL HIGH COMPLEX 45 MIN: CPT

## 2017-11-01 PROCEDURE — 85025 COMPLETE CBC W/AUTO DIFF WBC: CPT | Performed by: PHYSICIAN ASSISTANT

## 2017-11-01 PROCEDURE — 80048 BASIC METABOLIC PNL TOTAL CA: CPT | Performed by: PHYSICIAN ASSISTANT

## 2017-11-01 RX ORDER — ALPRAZOLAM 0.5 MG/1
0.5 TABLET ORAL 2 TIMES DAILY PRN
Status: DISCONTINUED | OUTPATIENT
Start: 2017-11-01 | End: 2017-11-07 | Stop reason: HOSPADM

## 2017-11-01 RX ORDER — LANOLIN ALCOHOL/MO/W.PET/CERES
6 CREAM (GRAM) TOPICAL
Status: DISCONTINUED | OUTPATIENT
Start: 2017-11-01 | End: 2017-11-07 | Stop reason: HOSPADM

## 2017-11-01 RX ADMIN — INSULIN LISPRO 14 UNITS: 100 INJECTION, SOLUTION INTRAVENOUS; SUBCUTANEOUS at 09:21

## 2017-11-01 RX ADMIN — ACETAMINOPHEN 650 MG: 325 TABLET ORAL at 00:33

## 2017-11-01 RX ADMIN — INSULIN LISPRO 3 UNITS: 100 INJECTION, SOLUTION INTRAVENOUS; SUBCUTANEOUS at 16:32

## 2017-11-01 RX ADMIN — PIPERACILLIN SODIUM,TAZOBACTAM SODIUM 4.5 G: 4; .5 INJECTION, POWDER, FOR SOLUTION INTRAVENOUS at 20:35

## 2017-11-01 RX ADMIN — INSULIN LISPRO 1 UNITS: 100 INJECTION, SOLUTION INTRAVENOUS; SUBCUTANEOUS at 21:29

## 2017-11-01 RX ADMIN — PIPERACILLIN SODIUM,TAZOBACTAM SODIUM 4.5 G: 4; .5 INJECTION, POWDER, FOR SOLUTION INTRAVENOUS at 16:31

## 2017-11-01 RX ADMIN — VANCOMYCIN HYDROCHLORIDE 1750 MG: 1 INJECTION, POWDER, LYOPHILIZED, FOR SOLUTION INTRAVENOUS at 13:48

## 2017-11-01 RX ADMIN — ALPRAZOLAM 0.5 MG: 0.5 TABLET ORAL at 16:31

## 2017-11-01 RX ADMIN — PIPERACILLIN SODIUM,TAZOBACTAM SODIUM 4.5 G: 4; .5 INJECTION, POWDER, FOR SOLUTION INTRAVENOUS at 03:55

## 2017-11-01 RX ADMIN — ACETAMINOPHEN 650 MG: 325 TABLET ORAL at 13:47

## 2017-11-01 RX ADMIN — LISINOPRIL 10 MG: 10 TABLET ORAL at 09:19

## 2017-11-01 RX ADMIN — SODIUM CHLORIDE 125 ML/HR: 0.9 INJECTION, SOLUTION INTRAVENOUS at 16:35

## 2017-11-01 RX ADMIN — VANCOMYCIN HYDROCHLORIDE 1750 MG: 1 INJECTION, POWDER, LYOPHILIZED, FOR SOLUTION INTRAVENOUS at 01:45

## 2017-11-01 RX ADMIN — PIPERACILLIN SODIUM,TAZOBACTAM SODIUM 4.5 G: 4; .5 INJECTION, POWDER, FOR SOLUTION INTRAVENOUS at 09:26

## 2017-11-01 RX ADMIN — ACETAMINOPHEN 650 MG: 325 TABLET ORAL at 05:31

## 2017-11-01 RX ADMIN — ENOXAPARIN SODIUM 40 MG: 40 INJECTION SUBCUTANEOUS at 09:19

## 2017-11-01 RX ADMIN — INSULIN GLARGINE 40 UNITS: 100 INJECTION, SOLUTION SUBCUTANEOUS at 21:28

## 2017-11-01 RX ADMIN — ACETAMINOPHEN 650 MG: 325 TABLET ORAL at 23:26

## 2017-11-01 RX ADMIN — ACETAMINOPHEN 650 MG: 325 TABLET ORAL at 17:41

## 2017-11-01 RX ADMIN — INSULIN LISPRO 2 UNITS: 100 INJECTION, SOLUTION INTRAVENOUS; SUBCUTANEOUS at 09:20

## 2017-11-01 RX ADMIN — MELATONIN 6 MG: 3 TAB ORAL at 23:36

## 2017-11-01 NOTE — ASSESSMENT & PLAN NOTE
· Pt diagnosed with severe sepsis on admission with leukocytosis, tachycardia and elevated lactic acid  · Blood cultures showing no growth at 24 hours  · WBC improved to 10 81  · Elevated lactic acid resolved, 0 9  · Non-tachy, afebrile  · Currently on vanco and zosyn for gangrene toe, continue antibiotics and trend patient's symptoms

## 2017-11-01 NOTE — PHYSICAL THERAPY NOTE
PHYSICAL THERAPY EVALUATION  NAME:  Serge Phillips  DATE: 11/01/17    AGE:   45 y o  Mrn:   [id number]  ADMIT DX:  Blister of toe with infection [S90 426A, L08 9]  Cellulitis of left foot [L03 116]  Diabetic foot infection ([facility] ) [E11 69, L08 9]  Acute osteomyelitis of toe, left ([facility] ) [M86 172]  Diabetic ulcer of toe of left foot associated with diabetes mellitus due to underlying condition, with necrosis of muscle ([facility] ) [E72 098, L97 523]    Past Medical History:   Diagnosis Date    Diabetes mellitus ([facility] )     Hypertension      Length Of Stay: 2  PHYSICAL THERAPY EVALUATION :    11/01/17 1151   Note Type   Note type Eval only   Pain Assessment   Pain Assessment No/denies pain   Home Living   Type of [address] [place] [address] Two level; Able to live on main level with bedroom/bathroom;Stairs to enter with rails  (3STE)   Home Equipment (none prior to admission)   Prior Function   Level of [place] Independent with ADLs and functional mobility   Lives With Spouse;Daughter  (has one 13 y/o daughter in [facility]--tearful when talking about )   ADL Assistance Independent   IADLs Independent   Falls in the last 6 months 0   Vocational Full time employment  ()   Restrictions/Precautions   [place] Bearing Precautions Per Order Yes   LLE Weight Bearing Per Order NWB   Other Precautions Fall Risk;WBS   General   Family/Caregiver Present No   Cognition   Overall Cognitive Status WFL   Arousal/Participation Alert   Orientation Level Oriented X4   Memory Within functional limits   Following Commands Follows one step commands with increased time or repetition   RUE Strength   RUE Overall Strength Within Functional Limits - able to perform ADL tasks with strength   LUE Strength   LUE Overall Strength Within Functional Limits - able to perform ADL tasks with strength   Strength RLE   R Hip Flexion 4+/5   R Knee Extension 4+/5   R Ankle Dorsiflexion 4+/5   Strength LLE   L Hip Flexion 4/5   L Knee Extension 4/5   L Ankle Dorsiflexion (tested to 3)   Coordination   Movements are Fluid and Coordinated 1   Sensation (vision and hearing)   Light Touch   RLE Light Touch Absent   RLE Light Touch Comments from distal to ankle to toes   LLE Light Touch Absent   LLE Light Touch Comments toes   Proprioception   RLE Proprioception Absent   LLE Proprioception Not tested   Bed Mobility   Supine to Sit 6  Modified independent   Additional items Assist x 1;HOB elevated; Increased time required   Transfers   Sit to Stand 5  Supervision   Additional items Assist x 1; Increased time required;Verbal cues;Armrests   Stand to Sit 5  Supervision   Additional items Assist x 1; Increased time required;Verbal cues   Ambulation/Elevation   Gait pattern Excessively slow; Step to;Short stride  (occasionally walking too close to walker)   Gait Assistance 4  Minimal assist   Additional items Assist x 1;Verbal cues   Assistive Device Rolling walker   Distance 20'x2   Stair Management Assistance Not tested   Balance   Static Sitting Good   Static Standing Poor +  (4 min while maintaining NWB on LLE)   Ambulatory Poor +   Endurance Deficit   Endurance Deficit Yes   Endurance Deficit Description limited amb distance and standing tolerance   Activity Tolerance   Activity Tolerance Patient limited by fatigue;Patient limited by pain   Nurse Made Aware spoke to RN   Assessment   Prognosis Fair   Problem List Decreased strength;Decreased range of motion;Decreased endurance; Impaired balance;Decreased mobility; Impaired sensation;Obesity; Decreased skin integrity;Orthopedic restrictions   Barriers to Discharge (HUSEYIN)   Barriers to Discharge Comments Pt also reports having stressors at home, including 11 y/o daughter   Goals   Patient Goals to go home   STG Expiration Date 11/11/17   Short Term Goal #1 Pt will perform transfers modified I, amb w/least restrictive devive for > 50' w/o uncorrected losses of balance, perform 3 stairs w/ 1 rail and one crutch with S, I bed mobility, indep with HEP   Treatment Day 0   Plan   Treatment/Interventions Functional transfer training;LE strengthening/ROM; Elevations; Therapeutic exercise; Endurance training;Cognitive reorientation;Patient/family training;Equipment eval/education; Bed mobility;Gait training;Spoke to nursing   PT Frequency 5x/wk   Recommendation   Recommendation Home with family support;Home PT   Equipment Recommended Walker  (for level surfaces, possibly crutches for stairs to enter)   PT - OK to Discharge No   Additional Comments Needs to perform stairs prior to DC   Barthel Index   Feeding 10   Bathing 0   Grooming Score 5   Dressing Score 5   Bladder Score 10   Bowels Score 10   Toilet Use Score 5   Transfers (Bed/Chair) Score 10   Mobility (Level Surface) Score 0   Stairs Score 0   Barthel Index Score 55   (Please find full objective findings from PT assessment regarding body systems outlined above)  Assessment: Pt is a 45 y o  male seen for PT evaluation s/p admit to [facility] on 10/30/2017 w/ Diabetic ulcer of left foot associated with diabetes mellitus due to underlying condition, with necrosis of muscle ([address] )  On 10/30/2017 pt had Guillotine Left hallux amputation with irrigation and drainage  Pt is NWB LLE post op  Order placed for PT  Prior to admission, pt was independent w/ all functional mobility w/ o device, lives in multi-level home, has 3 HUSEYIN, lives with spouse and 1 child and works full time  Upon evaluation: Pt requires min A for ambulation, S for transfers and bed mobility  Impairments and limitations also listed above, especially due to  weakness, decreased ROM, impaired balance, decreased endurance, gait deviations, pain, fall risk and orthopedic restrictions  The following objective measures performed on IE also reveal limitations: Barthel Index 55/100   Pt's clinical presentation is currently unstable/unpredictable seen in pt's presentation of limited mobility compared to pt's baseline, coupled with fall risk due to limited WB on LLE, absent sensation B feet; plus pot op elevated WBCs and platelets  Pt to benefit from continued skilled PT tx while in hospital and upon DC to address deficits as defined above and maximize level of functional mobility  From PT/mobility standpoint, recommendation at time of d/c would be home with family support, with walker and likely crutches for stairs pending progress in order to maximize pt's functional independence and consistency w/ mobility in order to facilitate return to PLOF  Recommend trial with walker next session and crutch training with stairs  Comorbidities affecting pt's physical performance at time of assessment include: DM, HTN, obesity and neuropathy  Personal factors affecting pt at time of IE include: unable to perform caregiver support/tasks, steps to enter environment, limited home support, inability to perform current job functions, inability to perform IADLs and additional family stressors with his 11 y/o daughters current situation      Frankey Reil, PT, DPT

## 2017-11-01 NOTE — ASSESSMENT & PLAN NOTE
· Patient denies any insulin use at home, has only been on metformin BID  · Reports checking his blood sugar every so often and is usually high, 200-300s   · On SSI, lantus 40 units QD and humalog 14 with meals here  · Most recent blood sugar 192  · Endocrine consulted based on patient's sugars continuing to be high despite aggressive insulin regime, will likely need a new home regime for his diabetes when discharged  · HgbA1C 10 3

## 2017-11-01 NOTE — ASSESSMENT & PLAN NOTE
· Resolved, was likely pseudohyponatremia from hyperglycemia   · Continue IVF, monitor BMP  · Sodium 136 today

## 2017-11-01 NOTE — ASSESSMENT & PLAN NOTE
· XR left foot 3+ views 10/30 - Extensive subcutaneous air noted at the 1st toe extending to the 1st metatarsal region along with soft tissue swelling  Correlate for gas gangrene  Comminuted fracture at the 1st distal phalanx proximally   Fracture margins are ill-defined, underlying osteomyelitis is not excluded      · Pt was taken to the OR on 10/30 for open hallux amputation d/t left hallux gangrene  · Hgb 11 8 today, stable continue to follow CBC  · Podiatry and ID following, pt will likely need second procedure towards end of the week   · Currently on vancomycin and zosyn per Id, 1/2 wound culture 3+ growth of oxidase + gram - rods   · Vanc trough 6 5, vanco dose increased   · Local wound care  · Continue IVF  · Blood cultures showing no growth at 24 hours   · Encourage incentive spirometry

## 2017-11-01 NOTE — CONSULTS
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  3   Left foot cellulitis- managed on antibiotics, improving  Plan:  -as fasting blood sugars are improving , we will continue Lantus 40 units at bedtime   -increase Humalog 15 units with meals plus scale  - hypoglycemia protocol in place  - considering his A1c and insulin requirement he will need a basal bolus insulin regimen on discharge  - he will need insulin pen/vial  Teaching by nursing based on his coverage for pens or vials before discharge  - will need appointment with endocrinology in 2 weeks after discharge  Thank you for consulting diabetes management  CC: Diabetes Consult    History of Present Illness     HPI: Jacqueline Doran is a 45y o  year old male with type 2 diabetes on oral medications at home uncontrolled with A1c of 10% was admitted to the hospital for ulcer on his left foot for last 2 weeks with increased swelling of toes and ankle  Imaging of left foot showed gangrenous changes and he was taken to OR for open hallux amputation secondary to gas gangrene on October 30, 2017  He has history of diabetic neuropathy, retinopathy and gastroparesis  At home he takes metformin 1000 mg twice a day  He was checking blood sugar once a day fasting and it was anywhere between 200-300 range  He admits noncompliance to diet  Has not seen diabetes educator in the last few years  He is seeing ophthalmologist for diabetic retinopathy  He was told by his primary care physician that he has proteins in the urine, however his recent creatinine is 0 74  He denies hypoglycemic episodes at home  During the hospital stay he was started on Lantus 40 units at bedtime and Humalog 14 units with meals    His blood sugars reviewed, they are coming in 100-150 range  Lab Results   Component Value Date    CREATININE 0 74 11/01/2017         Inpatient consult to Endocrinology  Consult performed by: [facility], [facility] [address]  ordered by: [place]          Review of Systems   Constitutional: Positive for activity change, appetite change, chills and fatigue  Negative for fever and unexpected weight change  HENT: Negative  Eyes: Negative for visual disturbance  Respiratory: Negative  Cardiovascular: Negative  Gastrointestinal: Negative  Endocrine: Negative  Genitourinary: Negative  Musculoskeletal: Positive for back pain  Allergic/Immunologic: Negative  Neurological: Positive for weakness and numbness (In lower extremities)  Hematological: Negative  Psychiatric/Behavioral: Negative  Historical Information   Past Medical History:   Diagnosis Date    Diabetes mellitus ([facility] [address] )     Hypertension      Past Surgical History:   Procedure Laterality Date    DE AMPUTATION FOOT,TRANSMETATARSAL Left 10/30/2017    Procedure: AMPUTATION TRANSMETATARSAL (TMA);  Surgeon: Pedro Odonnell DPM;  Location: [facility];  Service: Podiatry     Social History   History   Alcohol Use    Yes     History   Drug Use No     History   Smoking Status    Current Some Day Smoker    Types: Cigars   Smokeless Tobacco    Never Used     Family History: History reviewed  No pertinent family history      Meds/Allergies   Current Facility-Administered Medications   Medication Dose Route Frequency Provider Last Rate Last Dose    acetaminophen (TYLENOL) tablet 650 mg  650 mg Oral Q6H [address] Odalis Wheat PA-C   650 mg at 11/01/17 1347    ALPRAZolam (XANAX) tablet 0 5 mg  0 5 mg Oral BID PRN April Lopez PA-C        enoxaparin (LOVENOX) subcutaneous injection 40 mg  40 mg Subcutaneous Daily Christopher Beaulieu PA-C   40 mg at 11/01/17 0919    influenza inactivated quadrivalent vaccine (FLULAVAL) IM injection 0 5 mL  0 5 mL Intramuscular Prior to discharge Shawna Pod, PA-C        insulin glargine (LANTUS) subcutaneous injection 40 Units  40 Units Subcutaneous HS Shawna Pod, PA-C   40 Units at 10/31/17 2143    insulin lispro (HumaLOG) 100 units/mL subcutaneous injection 1-6 Units  1-6 Units Subcutaneous TID AC Christopher Handy, PARocioC   2 Units at 11/01/17 0920    insulin lispro (HumaLOG) 100 units/mL subcutaneous injection 1-6 Units  1-6 Units Subcutaneous HS Shawna Pod, PA-C   1 Units at 10/31/17 2143    insulin lispro (HumaLOG) 100 units/mL subcutaneous injection 14 Units  14 Units Subcutaneous TID With Meals Shawna Pod, PA-C   14 Units at 11/01/17 4674    lisinopril (ZESTRIL) tablet 10 mg  10 mg Oral Daily Shawna Pod, SHERYL   10 mg at 11/01/17 0919    morphine injection 2 mg  2 mg Intravenous Q6H PRN Janet Bawja PA-C        nicotine (NICODERM CQ) 14 mg/24hr TD 24 hr patch 1 patch  1 patch Transdermal Daily Christopher Handy PA-C        ondansetron (ZOFRAN) injection 4 mg  4 mg Intravenous Q6H PRN Christopher Handy PA-C        oxyCODONE (ROXICODONE) IR tablet 5 mg  5 mg Oral Q4H PRN Janet Bajwa PA-C   5 mg at 10/31/17 2009    piperacillin-tazobactam (ZOSYN) 4 5 g in sodium chloride 0 9 % 100 mL IVPB  4 5 g Intravenous Q6H Sada Ferreira  mL/hr at 11/01/17 0926 4 5 g at 11/01/17 0926    sodium chloride 0 9 % infusion  125 mL/hr Intravenous Continuous Shawna Pod, SHERYL 125 mL/hr at 10/31/17 1841 125 mL/hr at 10/31/17 1841    traMADol (ULTRAM) tablet 50 mg  50 mg Oral Q6H PRN Janet Bajwa PA-C        vancomycin (VANCOCIN) 1,750 mg in sodium chloride 0 9 % 500 mL IVPB  17 5 mg/kg Intravenous Q12H Shon Urias PA-C 250 mL/hr at 11/01/17 1348 1,750 mg at 11/01/17 1348     No Known Allergies    Objective   Vitals: Blood pressure 138/79, pulse 84, temperature 98 °F (36 7 °C), temperature source Oral, resp   rate 18, height 6' 3" (1 905 m), weight 107 kg (236 lb 8 9 oz), SpO2 95 %     Intake/Output Summary (Last 24 hours) at 11/01/17 1536  Last data filed at 11/01/17 1200   Gross per 24 hour   Intake          3028 34 ml   Output                0 ml   Net          3028 34 ml     Invasive Devices     Peripheral Intravenous Line            Peripheral IV 10/30/17 Right Antecubital 2 days    Peripheral IV 10/30/17 Right Forearm 2 days                Physical Exam   Constitutional: He is oriented to person, place, and time  He appears well-developed and well-nourished  No distress  HENT:   Head: Normocephalic and atraumatic  Eyes: Conjunctivae and EOM are normal  Right eye exhibits no discharge  Left eye exhibits no discharge  Neck: Normal range of motion  Neck supple  No thyromegaly present  Cardiovascular: Normal rate, regular rhythm and normal heart sounds  No murmur heard  Pulmonary/Chest: Effort normal and breath sounds normal  No respiratory distress  He has no wheezes  He has no rales  Abdominal: Soft  Bowel sounds are normal  He exhibits no distension  Musculoskeletal: Normal range of motion  He exhibits no edema  Left foot in dressing, no ulcers on right foot   Neurological: He is alert and oriented to person, place, and time  He has normal reflexes  Skin: Skin is warm and dry  No rash noted  He is not diaphoretic  No erythema  Psychiatric: He has a normal mood and affect  His behavior is normal        The history was obtained from the review of the chart, patient      Lab Results:     Results from last 7 days  Lab Units 10/31/17  0441   HEMOGLOBIN A1C % 10 3*     Lab Results   Component Value Date    WBC 10 81 (H) 11/01/2017    HGB 11 8 (L) 11/01/2017    HCT 35 4 (L) 11/01/2017    MCV 86 11/01/2017     (H) 11/01/2017     Lab Results   Component Value Date/Time    BUN 8 11/01/2017 05:26 AM    BUN 16 04/05/2014 09:43 AM     11/01/2017 05:26 AM     04/05/2014 09:43 AM    K 3 5 11/01/2017 05:26 AM    K 3 9 04/05/2014 09:43 AM     11/01/2017 05:26 AM     04/05/2014 09:43 AM    CO2 26 11/01/2017 05:26 AM    CO2 25 04/05/2014 09:43 AM    CREATININE 0 74 11/01/2017 05:26 AM    CREATININE 0 81 04/05/2014 09:43 AM    AST 11 10/05/2016 05:39 PM    AST 20 04/05/2014 09:43 AM    ALT 28 10/05/2016 05:39 PM    ALT 36 04/05/2014 09:43 AM     No results for input(s): CHOL, HDL, LDL, TRIG, VLDL in the last 72 hours  No results found for: Sergei Alcala  POC Glucose (mg/dl)   Date Value   11/01/2017 135   11/01/2017 192 (H)   10/31/2017 150 (H)   10/31/2017 160 (H)   10/31/2017 160 (H)   10/31/2017 214 (H)   10/30/2017 249 (H)   10/30/2017 302 (H)     Lab Results   Component Value Date    HGBA1C 10 3 (H) 10/31/2017       Imaging Studies: I have personally reviewed pertinent reports  X-ray of left foot  FINDINGS:     Extensive subcutaneous air is noted at the 1st extending to the 1st metatarsal region along with soft tissue swelling  This partially limits evaluation of underlying osseous structures  There does appear to be a comminuted fracture at the 1st distal   phalanx proximally  Fracture margins are ill-defined, underlying osteomyelitis is not excluded        Hammertoe is noted of the 2nd and 3rd toes  There is a plantar calcaneal heel spur      IMPRESSION:     1  Extensive subcutaneous air noted at the 1st toe extending to the 1st metatarsal region along with soft tissue swelling  Correlate for gas gangrene  2    Comminuted fracture at the 1st distal phalanx proximally  Fracture margins are ill-defined, underlying osteomyelitis is not excluded          Portions of the record may have been created with voice recognition software

## 2017-11-01 NOTE — PLAN OF CARE
Problem: PHYSICAL THERAPY ADULT  Goal: Performs mobility at highest level of function for planned discharge setting  See evaluation for individualized goals  Treatment/Interventions: Functional transfer training, LE strengthening/ROM, Elevations, Therapeutic exercise, Endurance training, Cognitive reorientation, Patient/family training, Equipment eval/education, Bed mobility, Gait training, Spoke to nursing  Equipment Recommended: Latricia Gil (for level surfaces, possibly crutches for stairs to enter)       See flowsheet documentation for full assessment, interventions and recommendations  Prognosis: Fair  Problem List: Decreased strength, Decreased range of motion, Decreased endurance, Impaired balance, Decreased mobility, Impaired sensation, Obesity, Decreased skin integrity, Orthopedic restrictions  Assessment:  Pt is a 45 y o  male seen for PT evaluation s/p admit to [facility] on 10/30/2017 w/ Diabetic ulcer of left foot associated with diabetes mellitus due to underlying condition, with necrosis of muscle ([address] )  On 10/30/2017 pt had Guillotine Left hallux amputation with irrigation and drainage  Pt is NWB LLE post op  Order placed for PT  Prior to admission, pt was independent w/ all functional mobility w/ o device, lives in multi-level home, has 3 HUSEYIN, lives with spouse and 1 child and works full time  Upon evaluation: Pt requires min A for ambulation, S for transfers and bed mobility  Impairments and limitations also listed above, especially due to  weakness, decreased ROM, impaired balance, decreased endurance, gait deviations, pain, fall risk and orthopedic restrictions  The following objective measures performed on IE also reveal limitations: Barthel Index 55/100   Pt's clinical presentation is currently unstable/unpredictable seen in pt's presentation of limited mobility compared to pt's baseline, coupled with fall risk due to limited WB on LLE, absent sensation B feet; plus pot op elevated WBCs and platelets  Pt to benefit from continued skilled PT tx while in hospital and upon DC to address deficits as defined above and maximize level of functional mobility  From PT/mobility standpoint, recommendation at time of d/c would be home with family support, with walker and likely crutches for stairs pending progress in order to maximize pt's functional independence and consistency w/ mobility in order to facilitate return to PLOF  Recommend trial with walker next session and crutch training with stairs  Barriers to Discharge:  (HUSEYIN)  Barriers to Discharge Comments: Pt also reports having stressors at home, including 13 y/o daughter  Recommendation: Home with family support, Home PT     PT - OK to Discharge: No    See flowsheet documentation for full assessment

## 2017-11-01 NOTE — PROGRESS NOTES
Progress Note - Serge Phillips 45 y o  male MRN: [id number]    Unit/Bed#: -01 Encounter: [id number]       DOS: 11/1/2017      * Diabetic ulcer of left foot associated with diabetes mellitus due to underlying condition, with necrosis of muscle ([facility] [address] )   Assessment & Plan    · XR left foot 3+ views 10/30 - Extensive subcutaneous air noted at the 1st toe extending to the 1st metatarsal region along with soft tissue swelling  Correlate for gas gangrene  Comminuted fracture at the 1st distal phalanx proximally   Fracture margins are ill-defined, underlying osteomyelitis is not excluded      · Pt was taken to the OR on 10/30 for open hallux amputation d/t left hallux gangrene  · Hgb 11 8 today, stable continue to follow CBC  · Podiatry and ID following, pt will likely need second procedure towards end of the week   · Currently on vancomycin and zosyn per Id, 1/2 wound culture 3+ growth of oxidase + gram - rods   · Vanc trough 6 5, vanco dose increased   · Local wound care  · Continue IVF  · Blood cultures showing no growth at 24 hours   · Encourage incentive spirometry         Leukocytosis   Assessment & Plan    · Pt diagnosed with severe sepsis on admission with leukocytosis, tachycardia and elevated lactic acid  · Blood cultures showing no growth at 24 hours  · WBC improved to 10 81  · Elevated lactic acid resolved, 0 9  · Non-tachy, afebrile  · Currently on vanco and zosyn for gangrene toe, continue antibiotics and trend patient's symptoms          Type 2 diabetes mellitus with foot ulcer, without long-term current use of insulin (HCC)   Assessment & Plan    · Patient denies any insulin use at home, has only been on metformin BID  · Reports checking his blood sugar every so often and is usually high, 200-300s   · On SSI, lantus 40 units QD and humalog 14 with meals here  · Most recent blood sugar 192  · Endocrine consulted based on patient's sugars continuing to be high despite aggressive insulin regime, will likely need a new home regime for his diabetes when discharged  · HgbA1C 10 3           Hyponatremia   Assessment & Plan    · Resolved, was likely pseudohyponatremia from hyperglycemia   · Continue IVF, monitor BMP  · Sodium 136 today         Essential hypertension   Assessment & Plan    · Most recent /79  · Continue lisinopril 10 mg   · Continue to monitor BP trends            VTE Pharmacologic Prophylaxis:   Pharmacologic: Enoxaparin (Lovenox)  Mechanical VTE Prophylaxis in Place: Yes    Patient Centered Rounds: I have performed bedside rounds with nursing staff today  Discussions with Specialists or Other Care Team Provider: Reviewed ID notes    Education and Discussions with Family / Patient: Patient, when asked to update family patient politely declined     Time Spent for Care: 30 minutes  More than 50% of total time spent on counseling and coordination of care as described above  Current Length of Stay: 2 day(s)    Current Patient Status: Inpatient   Certification Statement: The patient will continue to require additional inpatient hospital stay due to IV antibiotics and input from podiatry and ID    Discharge Plan: Pending podiatry input on possible second surgery and when patient can transition from IV antibiotics  Code Status: Level 1 - Full Code      Subjective:   Patient is a 45year old male with history of uncontrollable type 2 diabetes mellitus who presented with a left gangrenous toe  He is POD#2 from left hallux amputation  Today he denies pain at the moment, but did have some pain last night for which he was given tylenol and it resolved  He currently denies dizziness, chest pain, shortness of breath, abdominal pain, nausea and vomiting  Patient did report some abdominal discomfort today, but says it was "all just gas " He has not had a BM since yesterday morning, but states his bowel habits are not very regular       Objective:     Vitals:   Temp (24hrs), Av 5 °F (36 9 °C), Min:98 °F (36 7 °C), Max:98 8 °F (37 1 °C)    HR:  [84-96] 84  Resp:  [16-18] 18  BP: (135-138)/(79-82) 138/79  SpO2:  [91 %-95 %] 95 %  Body mass index is 29 57 kg/m²  Input and Output Summary (last 24 hours): Intake/Output Summary (Last 24 hours) at 11/01/17 1025  Last data filed at 10/31/17 1841   Gross per 24 hour   Intake          2948 34 ml   Output              400 ml   Net          2548 34 ml       Physical Exam:     Physical Exam   Constitutional: He appears well-developed and well-nourished  No distress  Pt is in no acute distress lying in hospital bed watching TV   HENT:   Head: Normocephalic and atraumatic  Cardiovascular: Normal rate and regular rhythm  Intact distal pulses on the right, left leg heavily wrapped d/t recent surgery   Pulmonary/Chest: Effort normal and breath sounds normal  No respiratory distress  Abdominal: Soft  Bowel sounds are normal  He exhibits no distension  There is no tenderness  There is no rebound  Musculoskeletal: He exhibits edema  Left leg with 1+ pitting edema from top of wrap to mid-shin  No erythema  Neurological: He is alert  Skin: Skin is warm and dry  Vitals reviewed  Additional Data:     Labs:      Results from last 7 days  Lab Units 11/01/17  0526   WBC Thousand/uL 10 81*   HEMOGLOBIN g/dL 11 8*   HEMATOCRIT % 35 4*   PLATELETS Thousands/uL 517*   NEUTROS PCT % 64   LYMPHS PCT % 27   MONOS PCT % 6   EOS PCT % 2       Results from last 7 days  Lab Units 11/01/17  0526   SODIUM mmol/L 136   POTASSIUM mmol/L 3 5   CHLORIDE mmol/L 102   CO2 mmol/L 26   BUN mg/dL 8   CREATININE mg/dL 0 74   CALCIUM mg/dL 8 0*   GLUCOSE RANDOM mg/dL 179*           * I Have Reviewed All Lab Data Listed Above  * Additional Pertinent Lab Tests Reviewed:  All Labs Within Last 24 Hours Reviewed    Imaging:    Imaging Reports Reviewed Today Include: None  Imaging Personally Reviewed by Myself Includes:  None    Recent Cultures (last 7 days):       Results from last 7 days  Lab Units 10/30/17  2009 10/30/17  1755 10/30/17  1215   BLOOD CULTURE   --   --  No Growth at 24 hrs  No Growth at 24 hrs  GRAM STAIN RESULT  No polys seen  4+ Gram negative rods  4+ Gram positive cocci in pairs, chains and clusters  1+ Gram positive rods 1+ Polys  4+ Gram negative rods  4+ Gram positive cocci in pairs, chains and clusters  3+ Gram positive rods  --    WOUND CULTURE  Culture results to follow  3+ Growth of Oxidase Positive gram negative sean*  --        Last 24 Hours Medication List:     acetaminophen 650 mg Oral Q6H OG   enoxaparin 40 mg Subcutaneous Daily   insulin glargine 40 Units Subcutaneous HS   insulin lispro 1-6 Units Subcutaneous TID AC   insulin lispro 1-6 Units Subcutaneous HS   insulin lispro 14 Units Subcutaneous TID With Meals   lisinopril 10 mg Oral Daily   nicotine 1 patch Transdermal Daily   piperacillin-tazobactam 4 5 g Intravenous Q6H   vancomycin 17 5 mg/kg Intravenous Q12H        Today, Patient Was Seen By: Carine Arita PA-C    ** Please Note: Dictation voice to text software may have been used in the creation of this document   **

## 2017-11-01 NOTE — PROGRESS NOTES
Progress Note - Infectious Disease   Rosaline Parker 45 y o  male MRN: [id number]  Unit/Bed#: -[place] Encounter: [id number]      Impression/Plan:  1   Left foot gas gangrene  Patient is now status post guillotine amputation of the left hallux  Operative culture is pending but Gram stain appears to be polymicrobic  Patient is clinically much improved  -continue vancomycin and Zosyn for now at current dose  -check vancomycin trough  -follow-up wound culture and adjust antibiotic p r n   -local wound care  -close Podiatry follow-up  -plan for repeat I and D later this week noted  -serial exams  -monitor CBC with diff and creatinine     2   Severe sepsis-POA   Leukocytosis and tachycardia with elevated lactate   Appears all secondary to 1   No other clear source appreciated   Consideration for the possibility of bacteremia  patient is clinically much improved  Sepsis has resolved  Fever has resolved  WBC decreased  Blood cultures are negative so far   -antibiotics as above  -follow-up blood cultures  -supportive care  -no additional ID workup for now     3   Diabetes mellitus-type 2 with hyperglycemia   Very poorly controlled diabetes at this time   -tighten glucose control as per the primary service     4   Hyponatremia-likely pseudohyponatremia secondary to the hyperglycemia  Improved with better glucose control   -monitor BMP     Discussed with patient in detail regarding the above plan  Discussed with Dr Vitor Dugan from [facility] service  Antibiotics:  Vancomycin/Zosyn 3  POD # 2      Subjective:  Patient feels well  He has minimal foot pain  Temperature is down  No chills  He is tolerating antibiotics well  No nausea, vomiting or diarrhea  No dizziness or hearing loss      Objective:  Vitals:  HR:  [84-96] 84  Resp:  [16-18] 18  BP: (135-138)/(79-82) 138/79  SpO2:  [91 %-95 %] 95 %  Temp (24hrs), Av 5 °F (36 9 °C), Min:98 °F (36 7 °C), Max:98 8 °F (37 1 °C)  Current: Temperature: 98 °F (36 7 °C)    Physical Exam:     General: Awake, alert, cooperative, no distress  Lungs: Expansion symmetric, no rales, no wheezing, respirations unlabored  Heart[de-identified]  Regular rate and rhythm, S1 and S2 normal, no murmur  Abdomen: Soft, nondistended, non-tender, bowel sounds active all four quadrants,        no masses, no organomegaly  Extremities: Left foot with dressing in place  Dressing is dry  Mild serous drainage  No tenderness  Stable mild edema  Skin:  No rash  Invasive Devices     Peripheral Intravenous Line            Peripheral IV 10/30/17 Right Antecubital 1 day    Peripheral IV 10/30/17 Right Forearm 1 day                Labs studies:   I have personally reviewed pertinent labs  Results from last 7 days  Lab Units 11/01/17  0526 10/31/17  0441 10/30/17  1215   SODIUM mmol/L 136 133* 127*   POTASSIUM mmol/L 3 5 3 6 3 8   CHLORIDE mmol/L 102 100 90*   CO2 mmol/L 26 24 27   ANION GAP mmol/L 8 9 10   BUN mg/dL 8 6 8   CREATININE mg/dL 0 74 0 69 0 95   EGFR ml/min/1 73sq m 105 108 90   GLUCOSE RANDOM mg/dL 179* 238* 475*   CALCIUM mg/dL 8 0* 8 0* 8 6       Results from last 7 days  Lab Units 11/01/17  0526 10/31/17  0441 10/30/17  1215   WBC Thousand/uL 10 81* 14 46* 21 32*   HEMOGLOBIN g/dL 11 8* 11 3* 13 8   PLATELETS Thousands/uL 517* 492* 563*       Results from last 7 days  Lab Units 10/30/17  2009 10/30/17  1755 10/30/17  1215   BLOOD CULTURE   --   --  No Growth at 24 hrs  No Growth at 24 hrs  GRAM STAIN RESULT  No polys seen  4+ Gram negative rods  4+ Gram positive cocci in pairs, chains and clusters  1+ Gram positive rods 1+ Polys  4+ Gram negative rods  4+ Gram positive cocci in pairs, chains and clusters  3+ Gram positive rods  --    WOUND CULTURE  Culture results to follow  3+ Growth of Oxidase Positive gram negative sean*  --        Imaging Studies:   I have personally reviewed pertinent imaging study reports and images in PACS      EKG, Pathology, and Other Studies:   I have personally reviewed pertinent reports

## 2017-11-02 ENCOUNTER — ANESTHESIA EVENT (INPATIENT)
Dept: PERIOP | Facility: HOSPITAL | Age: 52
DRG: 710 | End: 2017-11-02
Payer: COMMERCIAL

## 2017-11-02 PROBLEM — IMO0002 UNCONTROLLED TYPE 2 DIABETES MELLITUS WITH FOOT ULCER, WITHOUT LONG-TERM CURRENT USE OF INSULIN: Chronic | Status: ACTIVE | Noted: 2017-10-30

## 2017-11-02 PROBLEM — E87.1 HYPONATREMIA: Status: RESOLVED | Noted: 2017-10-31 | Resolved: 2017-11-02

## 2017-11-02 LAB
ANION GAP SERPL CALCULATED.3IONS-SCNC: 7 MMOL/L (ref 4–13)
BACTERIA SPEC ANAEROBE CULT: ABNORMAL
BACTERIA WND AEROBE CULT: ABNORMAL
BACTERIA WND AEROBE CULT: ABNORMAL
BASOPHILS # BLD AUTO: 0.05 THOUSANDS/ΜL (ref 0–0.1)
BASOPHILS NFR BLD AUTO: 1 % (ref 0–1)
BUN SERPL-MCNC: 8 MG/DL (ref 5–25)
CALCIUM SERPL-MCNC: 8.3 MG/DL (ref 8.3–10.1)
CHLORIDE SERPL-SCNC: 105 MMOL/L (ref 100–108)
CO2 SERPL-SCNC: 26 MMOL/L (ref 21–32)
CREAT SERPL-MCNC: 0.83 MG/DL (ref 0.6–1.3)
EOSINOPHIL # BLD AUTO: 0.39 THOUSAND/ΜL (ref 0–0.61)
EOSINOPHIL NFR BLD AUTO: 4 % (ref 0–6)
ERYTHROCYTE [DISTWIDTH] IN BLOOD BY AUTOMATED COUNT: 12 % (ref 11.6–15.1)
GFR SERPL CREATININE-BSD FRML MDRD: 100 ML/MIN/1.73SQ M
GLUCOSE SERPL-MCNC: 120 MG/DL (ref 65–140)
GLUCOSE SERPL-MCNC: 123 MG/DL (ref 65–140)
GLUCOSE SERPL-MCNC: 129 MG/DL (ref 65–140)
GLUCOSE SERPL-MCNC: 130 MG/DL (ref 65–140)
GLUCOSE SERPL-MCNC: 133 MG/DL (ref 65–140)
GLUCOSE SERPL-MCNC: 143 MG/DL (ref 65–140)
GRAM STN SPEC: ABNORMAL
HCT VFR BLD AUTO: 33.8 % (ref 36.5–49.3)
HGB BLD-MCNC: 11.1 G/DL (ref 12–17)
LYMPHOCYTES # BLD AUTO: 2.11 THOUSANDS/ΜL (ref 0.6–4.47)
LYMPHOCYTES NFR BLD AUTO: 21 % (ref 14–44)
MCH RBC QN AUTO: 28.5 PG (ref 26.8–34.3)
MCHC RBC AUTO-ENTMCNC: 32.8 G/DL (ref 31.4–37.4)
MCV RBC AUTO: 87 FL (ref 82–98)
MONOCYTES # BLD AUTO: 0.59 THOUSAND/ΜL (ref 0.17–1.22)
MONOCYTES NFR BLD AUTO: 6 % (ref 4–12)
NEUTROPHILS # BLD AUTO: 7.14 THOUSANDS/ΜL (ref 1.85–7.62)
NEUTS SEG NFR BLD AUTO: 68 % (ref 43–75)
PLATELET # BLD AUTO: 529 THOUSANDS/UL (ref 149–390)
PMV BLD AUTO: 8.2 FL (ref 8.9–12.7)
POTASSIUM SERPL-SCNC: 3.4 MMOL/L (ref 3.5–5.3)
RBC # BLD AUTO: 3.9 MILLION/UL (ref 3.88–5.62)
SODIUM SERPL-SCNC: 138 MMOL/L (ref 136–145)
WBC # BLD AUTO: 10.28 THOUSAND/UL (ref 4.31–10.16)

## 2017-11-02 PROCEDURE — 80048 BASIC METABOLIC PNL TOTAL CA: CPT | Performed by: PHYSICIAN ASSISTANT

## 2017-11-02 PROCEDURE — 82948 REAGENT STRIP/BLOOD GLUCOSE: CPT

## 2017-11-02 PROCEDURE — 87040 BLOOD CULTURE FOR BACTERIA: CPT | Performed by: INTERNAL MEDICINE

## 2017-11-02 PROCEDURE — 97535 SELF CARE MNGMENT TRAINING: CPT

## 2017-11-02 PROCEDURE — 85025 COMPLETE CBC W/AUTO DIFF WBC: CPT | Performed by: PHYSICIAN ASSISTANT

## 2017-11-02 PROCEDURE — 97116 GAIT TRAINING THERAPY: CPT

## 2017-11-02 RX ORDER — POTASSIUM CHLORIDE 20 MEQ/1
20 TABLET, EXTENDED RELEASE ORAL ONCE
Status: COMPLETED | OUTPATIENT
Start: 2017-11-02 | End: 2017-11-02

## 2017-11-02 RX ADMIN — PIPERACILLIN SODIUM,TAZOBACTAM SODIUM 4.5 G: 4; .5 INJECTION, POWDER, FOR SOLUTION INTRAVENOUS at 21:32

## 2017-11-02 RX ADMIN — ALPRAZOLAM 0.5 MG: 0.5 TABLET ORAL at 08:32

## 2017-11-02 RX ADMIN — ACETAMINOPHEN 650 MG: 325 TABLET ORAL at 17:24

## 2017-11-02 RX ADMIN — LISINOPRIL 10 MG: 10 TABLET ORAL at 08:32

## 2017-11-02 RX ADMIN — INSULIN GLARGINE 40 UNITS: 100 INJECTION, SOLUTION SUBCUTANEOUS at 21:33

## 2017-11-02 RX ADMIN — POTASSIUM CHLORIDE 20 MEQ: 1500 TABLET, EXTENDED RELEASE ORAL at 17:24

## 2017-11-02 RX ADMIN — SODIUM CHLORIDE 125 ML/HR: 0.9 INJECTION, SOLUTION INTRAVENOUS at 00:35

## 2017-11-02 RX ADMIN — ACETAMINOPHEN 650 MG: 325 TABLET ORAL at 23:59

## 2017-11-02 RX ADMIN — VANCOMYCIN HYDROCHLORIDE 1750 MG: 1 INJECTION, POWDER, LYOPHILIZED, FOR SOLUTION INTRAVENOUS at 00:34

## 2017-11-02 RX ADMIN — MELATONIN 6 MG: 3 TAB ORAL at 21:33

## 2017-11-02 RX ADMIN — VANCOMYCIN HYDROCHLORIDE 1500 MG: 1 INJECTION, POWDER, LYOPHILIZED, FOR SOLUTION INTRAVENOUS at 10:50

## 2017-11-02 RX ADMIN — ENOXAPARIN SODIUM 40 MG: 40 INJECTION SUBCUTANEOUS at 08:32

## 2017-11-02 RX ADMIN — VANCOMYCIN HYDROCHLORIDE 1500 MG: 1 INJECTION, POWDER, LYOPHILIZED, FOR SOLUTION INTRAVENOUS at 18:58

## 2017-11-02 RX ADMIN — ACETAMINOPHEN 650 MG: 325 TABLET ORAL at 05:39

## 2017-11-02 RX ADMIN — PIPERACILLIN SODIUM,TAZOBACTAM SODIUM 4.5 G: 4; .5 INJECTION, POWDER, FOR SOLUTION INTRAVENOUS at 14:27

## 2017-11-02 RX ADMIN — ACETAMINOPHEN 650 MG: 325 TABLET ORAL at 11:32

## 2017-11-02 RX ADMIN — PIPERACILLIN SODIUM,TAZOBACTAM SODIUM 4.5 G: 4; .5 INJECTION, POWDER, FOR SOLUTION INTRAVENOUS at 09:03

## 2017-11-02 RX ADMIN — SODIUM CHLORIDE 125 ML/HR: 0.9 INJECTION, SOLUTION INTRAVENOUS at 13:27

## 2017-11-02 RX ADMIN — PIPERACILLIN SODIUM,TAZOBACTAM SODIUM 4.5 G: 4; .5 INJECTION, POWDER, FOR SOLUTION INTRAVENOUS at 03:29

## 2017-11-02 NOTE — ASSESSMENT & PLAN NOTE
· XR left foot 3+ views 10/30 - Extensive subcutaneous air noted at the 1st toe extending to the 1st metatarsal region along with soft tissue swelling  Correlate for gas gangrene  Comminuted fracture at the 1st distal phalanx proximally  Fracture margins are ill-defined, underlying osteomyelitis is not excluded      · Pt was taken to the OR on 10/30 for open hallux amputation d/t left hallux gangrene  · Hgb 11 1 today, stable continue to follow CBC  · Podiatry and ID following, planned procedure tomorrow morning, pt reported he was educated about procedure and had no further questions  · Currently on vancomycin (dose was increased d/t vanco trough) and zosyn per Id, ID to follow up wound cultures and make antibiotic adjustments if needed  · Local wound care  · Continue IVF  · 1/2 blood cultures showing gram + cocci in clusters, likely contaminant, 2/2 negative  · Second set blood cultures pending   Continue to monitor temps, CBC and patient symptoms  · Encourage incentive spirometry

## 2017-11-02 NOTE — ASSESSMENT & PLAN NOTE
· Pt diagnosed with severe sepsis on admission with leukocytosis, tachycardia and elevated lactic acid, resolved  · 1/2 blood cultures showing gram + cocci in clusters, most likely contaminant, 2/2 negative   · Second set of blood cultures pending, monitor for symptoms  · WBC improved to 10 28 today  · Elevated lactic acid resolved, 0 9  · Non-tachy, afebrile, no chills  · Currently on vanco and zosyn for gangrene toe, continue antibiotics and trend patient's symptoms

## 2017-11-02 NOTE — PLAN OF CARE
Problem: DISCHARGE PLANNING - CARE MANAGEMENT  Goal: Discharge to post-acute care or home with appropriate resources  INTERVENTIONS:  - Conduct assessment to determine patient/family and health care team treatment goals, and need for post-acute services based on payer coverage, community resources, and patient preferences, and barriers to discharge  - Address psychosocial, clinical, and financial barriers to discharge as identified in assessment in conjunction with the patient/family and health care team  - Arrange appropriate level of post-acute services according to patients   needs and preference and payer coverage in collaboration with the physician and health care team  - Communicate with and update the patient/family, physician, and health care team regarding progress on the discharge plan  - Arrange appropriate transportation to post-acute venues  Outcome: Progressing  LOS- 3 DAYS  PATIENT IS NOT A BUNDLE OR READMISSION  CM intern met with Pt at bedside  Pt reports he lives with his wife and daughter in a 2 story house located in [place] [address]  Pt reports he does not use any DME and can complete all ADL by himself prior to admission  Pt has no Hx of VNA or Inpt History  Pt uses the [employer] located in [place]  He uses welfare to cover his meds  Pt reports he has anxiety but does not use any drugs or alcohol  Pt goes to his PCP for his anxiety  Pt does not have a POA and does not want any information  Pt's emergency contact is his wife (Evelyn Tirado)  Pt recently started working at the [employer] but is looking to apply for SSD  Pt is capable of driving self  CM discussed [facility] and Pt is agreeable  CM placed referral  CM dept will continue to follow until d/c

## 2017-11-02 NOTE — PROGRESS NOTES
Progress Note - Chris Costello 45 y o  male MRN: [id number]    Unit/Bed#: -01 Encounter: [id number]       DOS: 11/2/2017      * Diabetic ulcer of left foot associated with diabetes mellitus due to underlying condition, with necrosis of muscle ([facility] )   Assessment & Plan    · XR left foot 3+ views 10/30 - Extensive subcutaneous air noted at the 1st toe extending to the 1st metatarsal region along with soft tissue swelling  Correlate for gas gangrene  Comminuted fracture at the 1st distal phalanx proximally  Fracture margins are ill-defined, underlying osteomyelitis is not excluded      · Pt was taken to the OR on 10/30 for open hallux amputation d/t left hallux gangrene  · Hgb 11 1 today, stable continue to follow CBC  · Podiatry and ID following, planned procedure tomorrow morning, pt reported he was educated about procedure and had no further questions  · Currently on vancomycin (dose was increased d/t vanco trough) and zosyn per Id, ID to follow up wound cultures and make antibiotic adjustments if needed  · Local wound care  · Continue IVF  · 1/2 blood cultures showing gram + cocci in clusters, likely contaminant, 2/2 negative  · Second set blood cultures pending   Continue to monitor temps, CBC and patient symptoms  · Encourage incentive spirometry         Leukocytosis   Assessment & Plan    · Pt diagnosed with severe sepsis on admission with leukocytosis, tachycardia and elevated lactic acid, resolved  · 1/2 blood cultures showing gram + cocci in clusters, most likely contaminant, 2/2 negative   · Second set of blood cultures pending, monitor for symptoms  · WBC improved to 10 28 today  · Elevated lactic acid resolved, 0 9  · Non-tachy, afebrile, no chills  · Currently on vanco and zosyn for gangrene toe, continue antibiotics and trend patient's symptoms          Uncontrolled type 2 diabetes mellitus with foot ulcer, without long-term current use of insulin ([facility] )   Assessment & Plan    · Patient denies any insulin use at home, has only been on metformin BID  · Reports checking his blood sugar every so often and is usually high, 200-300s   · Spoke with endocrine yesterday, increased humalog with meals to 15 units  Also on Lantus 40 units and SSI coverage  Pt will need outpatient insulin regime while at home with nurse teaching prior to discharge  Pt is to follow up with endocrine in 2 weeks after discharge for management  · Most recent blood sugar 129   · HgbA1C 10 3           Essential hypertension   Assessment & Plan    · Most recent /88   · Continue lisinopril 10 mg   · Continue to monitor BP trends          VTE Pharmacologic Prophylaxis:   Pharmacologic: Enoxaparin (Lovenox)  Mechanical VTE Prophylaxis in Place: Yes    Patient Centered Rounds: I have evaluated patient without nursing staff present due to speaking with nurse outside patient's room    Discussions with Specialists or Other Care Team Provider: Reviewed ID and podiatry notes, spoke with RN    Education and Discussions with Family / Patient: Patient, Dana Bai declined update with family  Amador Law was in the room as well  Time Spent for Care: 30 minutes  More than 50% of total time spent on counseling and coordination of care as described above  Current Length of Stay: 3 day(s)    Current Patient Status: Inpatient   Certification Statement: The patient will continue to require additional inpatient hospital stay due to IV antibiotics and additional surgical procedure scheduled for tomorrow    Discharge Plan: Pending based on IV antibiotics and recovery from scheduled procedure tomorrow  Code Status: Level 1 - Full Code      Subjective:   Patient is a 45year old male with history of uncontrolled type 2 diabetes who is s/p left hallux amputation  Today the patient is not experiencing pain, he is able to get up and go to the bathroom with a walker without increase of pain   He currently denies chest pain, shortness of breath, abdominal pain, nausea, vomiting and diarrhea  Pt reports podiatry and ID were in to see him this morning and he is scheduled for another procedure tomorrow morning  Stated he did not have any questions about it and the team was very thorough with their explanation of the procedure  He has no complaints at this time  Discussed need for outpatient insulin use once patient is discharged and he is agreeable  He reports he has been using his incentive spirometer  Objective:     Vitals:   Temp (24hrs), Av 6 °F (36 4 °C), Min:97 4 °F (36 3 °C), Max:98 °F (36 7 °C)    HR:  [79-82] 79  Resp:  [18] 18  BP: (140-175)/(71-92) 146/88  SpO2:  [93 %-94 %] 93 %  Body mass index is 29 57 kg/m²  Input and Output Summary (last 24 hours): Intake/Output Summary (Last 24 hours) at 17 1333  Last data filed at 17 0908   Gross per 24 hour   Intake           4097 5 ml   Output                0 ml   Net           4097 5 ml       Physical Exam:     Physical Exam   Constitutional: He appears well-developed and well-nourished  No distress  Pt is in no acute distress sitting in his hospital bed accompanied by his friend Xuan Rawlsa:   Head: Normocephalic and atraumatic  Cardiovascular: Normal rate, regular rhythm and normal heart sounds  Pulmonary/Chest: Effort normal and breath sounds normal  No respiratory distress  He has no wheezes  Abdominal: Soft  Bowel sounds are normal  He exhibits no distension  There is no tenderness  There is no guarding  Musculoskeletal: He exhibits edema  He exhibits no tenderness  Left leg wrapped from toes to ankle, dressing is clean, dry and intact  +1 pitting edema of the left lower extremity from ankle to mid shin, slightly improved  Neurological: He is alert  Skin: Skin is warm and dry  Vitals reviewed        Additional Data:     Labs:      Results from last 7 days  Lab Units 17  0550   WBC Thousand/uL 10 28*   HEMOGLOBIN g/dL 11 1*   HEMATOCRIT % 33 8* PLATELETS Thousands/uL 529*   NEUTROS PCT % 68   LYMPHS PCT % 21   MONOS PCT % 6   EOS PCT % 4       Results from last 7 days  Lab Units 11/02/17  0550   SODIUM mmol/L 138   POTASSIUM mmol/L 3 4*   CHLORIDE mmol/L 105   CO2 mmol/L 26   BUN mg/dL 8   CREATININE mg/dL 0 83   CALCIUM mg/dL 8 3   GLUCOSE RANDOM mg/dL 133           * I Have Reviewed All Lab Data Listed Above  * Additional Pertinent Lab Tests Reviewed: All Labs Within Last 24 Hours Reviewed    Imaging:    Imaging Reports Reviewed Today Include: None  Imaging Personally Reviewed by Myself Includes:  None    Recent Cultures (last 7 days):       Results from last 7 days  Lab Units 10/30/17  2009 10/30/17  1755 10/30/17  1215   BLOOD CULTURE   --   --  No Growth at 48 hrs  GRAM STAIN RESULT  No polys seen  4+ Gram negative rods  4+ Gram positive cocci in pairs, chains and clusters  1+ Gram positive rods 1+ Polys  4+ Gram negative rods  4+ Gram positive cocci in pairs, chains and clusters  3+ Gram positive rods Gram positive cocci in clusters   WOUND CULTURE  Few Colonies of Beta Hemolytic Streptococcus Group G*  4+ Growth of  4+ Growth of Oxidase Positive gram negative sean*  4+ Growth of   --        Last 24 Hours Medication List:     acetaminophen 650 mg Oral Q6H OG   enoxaparin 40 mg Subcutaneous Daily   insulin glargine 40 Units Subcutaneous HS   insulin lispro 1-5 Units Subcutaneous HS   insulin lispro 1-6 Units Subcutaneous TID AC   insulin lispro 15 Units Subcutaneous TID With Meals   lisinopril 10 mg Oral Daily   melatonin 6 mg Oral HS   nicotine 1 patch Transdermal Daily   nystatin-triamcinolone  Topical BID   piperacillin-tazobactam 4 5 g Intravenous Q6H   vancomycin 15 mg/kg Intravenous Q8H        Today, Patient Was Seen By: Mechelle Samuels PA-C    ** Please Note: Dictation voice to text software may have been used in the creation of this document   **

## 2017-11-02 NOTE — ASSESSMENT & PLAN NOTE
· Patient denies any insulin use at home, has only been on metformin BID  · Reports checking his blood sugar every so often and is usually high, 200-300s   · Spoke with endocrine yesterday, increased humalog with meals to 15 units  Also on Lantus 40 units and SSI coverage  Pt will need outpatient insulin regime while at home with nurse teaching prior to discharge  Pt is to follow up with endocrine in 2 weeks after discharge for management    · Most recent blood sugar 129   · HgbA1C 10 3

## 2017-11-02 NOTE — SOCIAL WORK
LOS- 3 DAYS  PATIENT IS NOT A BUNDLE OR READMISSION  CM intern met with Pt at bedside  Pt reports he lives with his wife and daughter in a 2 story house located in [place] [address]  Pt reports he does not use any DME and can complete all ADL by himself prior to admission  Pt has no Hx of VNA or Inpt History  Pt uses the [employer] located in [place]  He uses welfare to cover his meds  Pt reports he has anxiety but does not use any drugs or alcohol  Pt goes to his PCP for his anxiety  Pt does not have a POA and does not want any information  Pt's emergency contact is his wife (Lidia Muñiz)  Pt recently started working at the [employer] but is looking to apply for SSD  Pt is capable of driving self  CM discussed ALLA and Pt is agreeable  CM placed referral  CM dept will continue to follow until d/c

## 2017-11-02 NOTE — PROGRESS NOTES
Progress Note - Podiatry  Kj Loera 45 y o  male MRN: [id number]  Unit/Bed#: -01 Encounter: [id number]    Assessment  1  Left foot gas gangrene with severe sepsis POD #3 open hallux amputation  2  Left hallux osteomyelitis  3  Left foot cellulitis - improving  4  DM neuropathy    Plan:  1  7:30am tomorrow first ray amputation VS  TMA, hoping for full closure    NPO at midnight  Questions were answered about the procedure tomorrow  Patient is aware I will attempt to close just a 1st ray of possible but he may require a forefoot amputation  Alternatives risks and complications were discussed  No guarantees were given outcome procedure  Patient is also where the wound wait not be able to be fully closed tomorrow may require long-term wound care  Subjective/Objective   Chief Complaint:   Chief Complaint   Patient presents with    Toe Injury - Major     c/o left great toe and toenail black with odorous drainage noted  Also left foot and ankle swelling and increased blood sugars x 1 week  Subjective: 45 y o  y/o male seen and evaluated at bedside  No acute events overnight  Denies N/F/V/SOB/CP/cough/diarrhea/constipation  Blood pressure 146/88, pulse 79, temperature (!) 97 4 °F (36 3 °C), temperature source Oral, resp  rate 18, height 6' 3" (1 905 m), weight 107 kg (236 lb 8 9 oz), SpO2 93 %  ,Body mass index is 29 57 kg/m²      Lab Results   Component Value Date    WBC 10 28 (H) 11/02/2017    HGB 11 1 (L) 11/02/2017    HCT 33 8 (L) 11/02/2017    MCV 87 11/02/2017     (H) 11/02/2017     Lab Results   Component Value Date    GLUCOSE 133 11/02/2017    CALCIUM 8 3 11/02/2017     11/02/2017    K 3 4 (L) 11/02/2017    CO2 26 11/02/2017     11/02/2017    BUN 8 11/02/2017    CREATININE 0 83 11/02/2017         Invasive Devices     Peripheral Intravenous Line            Peripheral IV 10/30/17 Right Antecubital 2 days    Peripheral IV 10/30/17 Right Forearm 2 days Physical Exam:   General: alert, cooperative and no distress  Lungs: Normal respiratory effort, LCTABL  Heart: regular rate and rhythm   Abdomen: soft, non-tender  Positive bowel sounds  Extremity: Open left hallux amputation with exposed metatarsal  No puruelnce  Periwound skin has mild necrosis ~0 5cm  No pus or malodor  Lab, Imaging and other studies:     Imaging: I have personally reviewed pertinent films in PACS  EKG, Pathology, and Other Studies: I have personally reviewed pertinent reports  Portions of the record may have been created with voice recognition software  Occasional wrong word or "sound a like" substitutions may have occurred due to the inherent limitations of voice recognition software  Read the chart carefully and recognize, using context, where substitutions have occurred

## 2017-11-02 NOTE — PROGRESS NOTES
Progress Note - Infectious Disease   Gearl Sic 45 y o  male MRN: [id number]  Unit/Bed#: -01 Encounter: [id number]      Impression/Plan:  1   Left foot gas gangrene  Patient is now status post guillotine amputation of the left hallux  Operative culture is pending but Gram stain appears to be polymicrobic  Patient is clinically much improved  -continue vancomycin and Zosyn   -increased vancomycin dose for low trough  -follow-up wound culture and adjust antibiotic p r n   -local wound care  -close Podiatry follow-up  -plan for repeat I and D  and further bone resection tomorrow  noted  -serial exams  -monitor CBC with diff and creatinine     2   Severe sepsis-POA   Leukocytosis and tachycardia with elevated lactate   Appears all secondary to 1   No other clear source appreciated   Consideration for the possibility of bacteremia     patient is clinically much improved  Sepsis has resolved  Fever has resolved  WBC near normalized  -antibiotics as above  -follow-up blood cultures  -supportive care  -no additional ID workup for now     3   Bacteremia  1 out of 2 blood cultures positive for GPC in clusters  At this point, it is unclear whether this is contaminant or true bacteremia  Patient is already on IV vancomycin and is clinically improved  Will monitor final blood culture and recheck blood cultures   -vancomycin plan as in above   -follow-up on final blood culture results  -recheck blood cultures  4  Diabetes mellitus-type 2 with hyperglycemia   Very poorly controlled diabetes at this time   -tighten glucose control as per the primary service     5   Hyponatremia-likely pseudohyponatremia secondary to the hyperglycemia   Improved with better glucose control   -monitor BMP     Discussed with patient in detail regarding the above plan  Discussed with Dr Sabrina Shane from [facility] service      Antibiotics:  Vancomycin/Zosyn 4  POD # 3     Subjective:  Patient feels well    He has minimal foot pain   Temperature is down  No chills  Sweats improved  He is tolerating antibiotics well  No nausea, vomiting or diarrhea  No dizziness or hearing loss  Objective:  Vitals:  HR:  [79-82] 79  Resp:  [18] 18  BP: (140-175)/(71-92) 146/88  SpO2:  [93 %-94 %] 93 %  Temp (24hrs), Av 6 °F (36 4 °C), Min:97 4 °F (36 3 °C), Max:98 °F (36 7 °C)  Current: Temperature: (!) 97 4 °F (36 3 °C)    Physical Exam:     General: Awake, alert, cooperative, no distress  Lungs: Expansion symmetric, no rales, no wheezing, respirations unlabored  Heart[de-identified]  Regular rate and rhythm, S1 and S2 normal, no murmur  Abdomen: Soft, nondistended, non-tender, bowel sounds active all four quadrants,        no masses, no organomegaly  Extremities: Left foot with dressing in place  Dressing is dry  No purulence  Mild erythema  Minimal tenderness  Skin:  No rash  Invasive Devices     Peripheral Intravenous Line            Peripheral IV 10/30/17 Right Antecubital 2 days    Peripheral IV 10/30/17 Right Forearm 2 days                Labs studies:   I have personally reviewed pertinent labs  Results from last 7 days  Lab Units 17  0550 11/01/17  0526 10/31/17  0441   SODIUM mmol/L 138 136 133*   POTASSIUM mmol/L 3 4* 3 5 3 6   CHLORIDE mmol/L 105 102 100   CO2 mmol/L 26 26 24   ANION GAP mmol/L 7 8 9   BUN mg/dL 8 8 6   CREATININE mg/dL 0 83 0 74 0 69   EGFR ml/min/1 73sq m 100 105 108   GLUCOSE RANDOM mg/dL 133 179* 238*   CALCIUM mg/dL 8 3 8 0* 8 0*       Results from last 7 days  Lab Units 17  0550 17  0526 10/31/17  0441   WBC Thousand/uL 10 28* 10 81* 14 46*   HEMOGLOBIN g/dL 11 1* 11 8* 11 3*   PLATELETS Thousands/uL 529* 517* 492*       Results from last 7 days  Lab Units 10/30/17  2009 10/30/17  1755 10/30/17  1215   BLOOD CULTURE   --   --  No Growth at 48 hrs     GRAM STAIN RESULT  No polys seen  4+ Gram negative rods  4+ Gram positive cocci in pairs, chains and clusters  1+ Gram positive rods 1+ Polys  4+ Gram negative rods  4+ Gram positive cocci in pairs, chains and clusters  3+ Gram positive rods Gram positive cocci in clusters   WOUND CULTURE  Culture results to follow  3+ Growth of Oxidase Positive gram negative sean*  --        Imaging Studies:   I have personally reviewed pertinent imaging study reports and images in PACS  EKG, Pathology, and Other Studies:   I have personally reviewed pertinent reports

## 2017-11-02 NOTE — PHYSICAL THERAPY NOTE
PHYSICAL THERAPY NOTE          Patient Name: Norm De Jesus  [id number] Date: 11/2/2017 11/02/17 1504   Pain Assessment   Pain Assessment No/denies pain   Pain Score No Pain   Restrictions/Precautions   Weight Bearing Precautions Per Order Yes   LLE Weight Bearing Per Order NWB   Other Precautions Fall Risk;Visual impairment;WBS;Multiple lines   General   Chart Reviewed Yes   Response to Previous Treatment Patient with no complaints from previous session  Family/Caregiver Present No   Cognition   Overall Cognitive Status WFL   Orientation Level Oriented X4   Memory Within functional limits   Following Commands Follows one step commands with increased time or repetition   Subjective   Subjective Pt reports using walker to ambulate with walker to/from bathroom with nursing staff    Bed Mobility   Supine to Sit 7  Independent   Additional items HOB elevated   Sit to Supine 7  Independent   Additional items HOB elevated   Transfers   Sit to Stand 5  Supervision   Additional items Increased time required;Verbal cues  (Cues given for hand placement on arm rests )   Stand to Sit 5  Supervision   Additional items Increased time required;Verbal cues;Armrests  (Required cueing to reach back for armrests)   Additional Comments PT demonstrated knee scooter while pt  was supine in bed  Pt instructed in benefits/risks of knee scooter  Ambulation/Elevation   Gait pattern Excessively slow; Step to;Short stride   Gait Assistance 5  Supervision   Additional items Assist x 1;Verbal cues  (Needed A for IV lines, 1 self corrected loss of balance)   Assistive Device Rolling walker   Distance 30 ft x2   Stair Management Assistance Not tested   Balance   Static Sitting Good   Static Standing Fair   Ambulatory Fair +   Endurance Deficit   Endurance Deficit Yes   Endurance Deficit Description Required time to sit on chair in flores after 30 ft of ambulation with RW   Activity Tolerance   Activity Tolerance Patient limited by fatigue;Patient limited by pain   Nurse Made Aware Spoke with Connie Rousseau RN   Exercises   Hip Flexion 15 reps;Bilateral;Supine;AROM   Ankle Pumps 15 reps;Bilateral;Supine;AROM  (unable to perform inversion/eversion with proper sequence )   Assessment   Prognosis Fair   Problem List Decreased strength;Decreased range of motion;Decreased endurance; Impaired balance;Decreased mobility; Impaired sensation;Obesity; Decreased skin integrity;Orthopedic restrictions; Impaired vision   Assessment Pt remains NWB on L LE  Pt did make progress from initial evaluation  Pt requires similar A for bed mobility and tranfers, but needs less A now for ambulation with walker and is walking further distances  Pt did not need physical A to perform TherEx but was unable to perform ankle inversion/eversion bilaterally  Pt is progressing toward treatment goals but is not at his baseline limited due to WBS and endurance  Recommend next one/two sessions pt focus on ambulation with walker and stair performance with crutches  Please note that pt is scheduled for OR tomorrow 11/3  Barriers to Discharge (HUSEYIN)   Goals   Patient Goals to go home   STG Expiration Date 11/17/11   Treatment Day 1   Plan   Treatment/Interventions LE strengthening/ROM; Functional transfer training; Therapeutic exercise; Endurance training;Gait training;Bed mobility; Equipment eval/education;Patient/family training;Elevations; Spoke to nursing   Progress Progressing toward goals   PT Frequency 5x/wk   Recommendation   Recommendation Home with family support  (+ or - home health PT )   Equipment Recommended Walker   PT - OK to Discharge No   Additional Comments Needs to perform stairs prior to discharge     Denise Brochure SPT

## 2017-11-02 NOTE — PLAN OF CARE
Problem: PHYSICAL THERAPY ADULT  Goal: Performs mobility at highest level of function for planned discharge setting  See evaluation for individualized goals  Treatment/Interventions: Functional transfer training, LE strengthening/ROM, Elevations, Therapeutic exercise, Endurance training, Cognitive reorientation, Patient/family training, Equipment eval/education, Bed mobility, Gait training, Spoke to nursing  Equipment Recommended: [place] Raisin (for level surfaces, possibly crutches for stairs to enter)       See flowsheet documentation for full assessment, interventions and recommendations  Outcome: Progressing  Prognosis: Fair  Problem List: Decreased strength, Decreased range of motion, Decreased endurance, Impaired balance, Decreased mobility, Impaired sensation, Obesity, Decreased skin integrity, Orthopedic restrictions, Impaired vision  Assessment: Pt remains NWB on L LE  Pt did make progress from initial evaluation  Pt requires similar A for bed mobility and tranfers, but needs less A now for ambulation with walker and is walking further distances  Pt did not need physical A to perform TherEx but was unable to perform ankle inversion/eversion bilaterally  Pt is progressing toward treatment goals but is not at his baseline limited due to WBS and endurance  Recommend next one/two sessions pt focus on ambulation with walker and stair performance with crutches  Please note that pt is scheduled for OR tomorrow 11/3  Barriers to Discharge:  (HUSEYIN)  Barriers to Discharge Comments: Pt also reports having stressors at home, including 11 y/o daughter  Recommendation: Home with family support (+ or - home health PT )     PT - OK to Discharge: No    See flowsheet documentation for full assessment

## 2017-11-03 ENCOUNTER — APPOINTMENT (INPATIENT)
Dept: RADIOLOGY | Facility: HOSPITAL | Age: 52
DRG: 710 | End: 2017-11-03
Payer: COMMERCIAL

## 2017-11-03 ENCOUNTER — ANESTHESIA (INPATIENT)
Dept: PERIOP | Facility: HOSPITAL | Age: 52
DRG: 710 | End: 2017-11-03
Payer: COMMERCIAL

## 2017-11-03 PROBLEM — E87.6 HYPOKALEMIA: Status: ACTIVE | Noted: 2017-11-03

## 2017-11-03 LAB
ANION GAP SERPL CALCULATED.3IONS-SCNC: 7 MMOL/L (ref 4–13)
BACTERIA WND AEROBE CULT: ABNORMAL
BACTERIA WND AEROBE CULT: ABNORMAL
BUN SERPL-MCNC: 8 MG/DL (ref 5–25)
CALCIUM SERPL-MCNC: 8.5 MG/DL (ref 8.3–10.1)
CHLORIDE SERPL-SCNC: 105 MMOL/L (ref 100–108)
CO2 SERPL-SCNC: 29 MMOL/L (ref 21–32)
CREAT SERPL-MCNC: 0.97 MG/DL (ref 0.6–1.3)
ERYTHROCYTE [DISTWIDTH] IN BLOOD BY AUTOMATED COUNT: 12.1 % (ref 11.6–15.1)
GFR SERPL CREATININE-BSD FRML MDRD: 88 ML/MIN/1.73SQ M
GLUCOSE SERPL-MCNC: 114 MG/DL (ref 65–140)
GLUCOSE SERPL-MCNC: 194 MG/DL (ref 65–140)
GLUCOSE SERPL-MCNC: 194 MG/DL (ref 65–140)
GLUCOSE SERPL-MCNC: 250 MG/DL (ref 65–140)
GLUCOSE SERPL-MCNC: 86 MG/DL (ref 65–140)
GLUCOSE SERPL-MCNC: 90 MG/DL (ref 65–140)
GRAM STN SPEC: ABNORMAL
HCT VFR BLD AUTO: 35 % (ref 36.5–49.3)
HGB BLD-MCNC: 11.5 G/DL (ref 12–17)
MCH RBC QN AUTO: 28.6 PG (ref 26.8–34.3)
MCHC RBC AUTO-ENTMCNC: 32.9 G/DL (ref 31.4–37.4)
MCV RBC AUTO: 87 FL (ref 82–98)
PLATELET # BLD AUTO: 574 THOUSANDS/UL (ref 149–390)
PMV BLD AUTO: 8.1 FL (ref 8.9–12.7)
POTASSIUM SERPL-SCNC: 3.3 MMOL/L (ref 3.5–5.3)
RBC # BLD AUTO: 4.02 MILLION/UL (ref 3.88–5.62)
SODIUM SERPL-SCNC: 141 MMOL/L (ref 136–145)
WBC # BLD AUTO: 10.22 THOUSAND/UL (ref 4.31–10.16)

## 2017-11-03 PROCEDURE — 97110 THERAPEUTIC EXERCISES: CPT

## 2017-11-03 PROCEDURE — 97535 SELF CARE MNGMENT TRAINING: CPT

## 2017-11-03 PROCEDURE — 0Y6N0Z9 DETACHMENT AT LEFT FOOT, PARTIAL 1ST RAY, OPEN APPROACH: ICD-10-PCS | Performed by: PODIATRIST

## 2017-11-03 PROCEDURE — 85027 COMPLETE CBC AUTOMATED: CPT | Performed by: PHYSICIAN ASSISTANT

## 2017-11-03 PROCEDURE — 97116 GAIT TRAINING THERAPY: CPT

## 2017-11-03 PROCEDURE — 97530 THERAPEUTIC ACTIVITIES: CPT

## 2017-11-03 PROCEDURE — 0QBP0ZZ EXCISION OF LEFT METATARSAL, OPEN APPROACH: ICD-10-PCS | Performed by: PODIATRIST

## 2017-11-03 PROCEDURE — 88311 DECALCIFY TISSUE: CPT | Performed by: PODIATRIST

## 2017-11-03 PROCEDURE — 80048 BASIC METABOLIC PNL TOTAL CA: CPT | Performed by: PHYSICIAN ASSISTANT

## 2017-11-03 PROCEDURE — 82948 REAGENT STRIP/BLOOD GLUCOSE: CPT

## 2017-11-03 PROCEDURE — 73630 X-RAY EXAM OF FOOT: CPT

## 2017-11-03 PROCEDURE — 88304 TISSUE EXAM BY PATHOLOGIST: CPT | Performed by: PODIATRIST

## 2017-11-03 RX ORDER — METRONIDAZOLE 500 MG/1
500 TABLET ORAL EVERY 8 HOURS SCHEDULED
Status: DISCONTINUED | OUTPATIENT
Start: 2017-11-03 | End: 2017-11-03

## 2017-11-03 RX ORDER — LIDOCAINE HYDROCHLORIDE 10 MG/ML
INJECTION, SOLUTION INFILTRATION; PERINEURAL AS NEEDED
Status: DISCONTINUED | OUTPATIENT
Start: 2017-11-03 | End: 2017-11-03 | Stop reason: SURG

## 2017-11-03 RX ORDER — SODIUM CHLORIDE, SODIUM LACTATE, POTASSIUM CHLORIDE, CALCIUM CHLORIDE 600; 310; 30; 20 MG/100ML; MG/100ML; MG/100ML; MG/100ML
INJECTION, SOLUTION INTRAVENOUS CONTINUOUS PRN
Status: DISCONTINUED | OUTPATIENT
Start: 2017-11-03 | End: 2017-11-03 | Stop reason: SURG

## 2017-11-03 RX ORDER — ONDANSETRON 2 MG/ML
4 INJECTION INTRAMUSCULAR; INTRAVENOUS ONCE AS NEEDED
Status: DISCONTINUED | OUTPATIENT
Start: 2017-11-03 | End: 2017-11-03 | Stop reason: HOSPADM

## 2017-11-03 RX ORDER — MIDAZOLAM HYDROCHLORIDE 1 MG/ML
INJECTION INTRAMUSCULAR; INTRAVENOUS AS NEEDED
Status: DISCONTINUED | OUTPATIENT
Start: 2017-11-03 | End: 2017-11-03 | Stop reason: SURG

## 2017-11-03 RX ORDER — POTASSIUM CHLORIDE 20 MEQ/1
20 TABLET, EXTENDED RELEASE ORAL ONCE
Status: COMPLETED | OUTPATIENT
Start: 2017-11-03 | End: 2017-11-03

## 2017-11-03 RX ORDER — ONDANSETRON 2 MG/ML
INJECTION INTRAMUSCULAR; INTRAVENOUS AS NEEDED
Status: DISCONTINUED | OUTPATIENT
Start: 2017-11-03 | End: 2017-11-03 | Stop reason: SURG

## 2017-11-03 RX ORDER — FENTANYL CITRATE/PF 50 MCG/ML
25 SYRINGE (ML) INJECTION
Status: DISCONTINUED | OUTPATIENT
Start: 2017-11-03 | End: 2017-11-03 | Stop reason: HOSPADM

## 2017-11-03 RX ORDER — SULFAMETHOXAZOLE AND TRIMETHOPRIM 800; 160 MG/1; MG/1
1 TABLET ORAL EVERY 12 HOURS SCHEDULED
Status: DISCONTINUED | OUTPATIENT
Start: 2017-11-03 | End: 2017-11-07 | Stop reason: HOSPADM

## 2017-11-03 RX ORDER — PROPOFOL 10 MG/ML
INJECTION, EMULSION INTRAVENOUS AS NEEDED
Status: DISCONTINUED | OUTPATIENT
Start: 2017-11-03 | End: 2017-11-03 | Stop reason: SURG

## 2017-11-03 RX ADMIN — MIDAZOLAM HYDROCHLORIDE 2 MG: 1 INJECTION, SOLUTION INTRAMUSCULAR; INTRAVENOUS at 07:30

## 2017-11-03 RX ADMIN — SODIUM CHLORIDE 125 ML/HR: 0.9 INJECTION, SOLUTION INTRAVENOUS at 00:01

## 2017-11-03 RX ADMIN — INSULIN LISPRO 2 UNITS: 100 INJECTION, SOLUTION INTRAVENOUS; SUBCUTANEOUS at 13:19

## 2017-11-03 RX ADMIN — AMPICILLIN SODIUM AND SULBACTAM SODIUM 3 G: 2; 1 INJECTION, POWDER, FOR SOLUTION INTRAMUSCULAR; INTRAVENOUS at 23:46

## 2017-11-03 RX ADMIN — INSULIN LISPRO 3 UNITS: 100 INJECTION, SOLUTION INTRAVENOUS; SUBCUTANEOUS at 17:47

## 2017-11-03 RX ADMIN — SODIUM CHLORIDE, SODIUM LACTATE, POTASSIUM CHLORIDE, AND CALCIUM CHLORIDE: .6; .31; .03; .02 INJECTION, SOLUTION INTRAVENOUS at 07:30

## 2017-11-03 RX ADMIN — VANCOMYCIN HYDROCHLORIDE 1500 MG: 1 INJECTION, POWDER, LYOPHILIZED, FOR SOLUTION INTRAVENOUS at 11:33

## 2017-11-03 RX ADMIN — NYSTATIN AND TRIAMCINOLONE ACETONIDE: 100000; 1 CREAM TOPICAL at 17:47

## 2017-11-03 RX ADMIN — INSULIN GLARGINE 40 UNITS: 100 INJECTION, SOLUTION SUBCUTANEOUS at 21:06

## 2017-11-03 RX ADMIN — SULFAMETHOXAZOLE AND TRIMETHOPRIM 1 TABLET: 800; 160 TABLET ORAL at 21:06

## 2017-11-03 RX ADMIN — ACETAMINOPHEN 650 MG: 325 TABLET ORAL at 11:37

## 2017-11-03 RX ADMIN — MELATONIN 6 MG: 3 TAB ORAL at 21:06

## 2017-11-03 RX ADMIN — ALPRAZOLAM 0.5 MG: 0.5 TABLET ORAL at 11:37

## 2017-11-03 RX ADMIN — ACETAMINOPHEN 650 MG: 325 TABLET ORAL at 23:46

## 2017-11-03 RX ADMIN — LISINOPRIL 10 MG: 10 TABLET ORAL at 10:04

## 2017-11-03 RX ADMIN — PIPERACILLIN SODIUM,TAZOBACTAM SODIUM 4.5 G: 4; .5 INJECTION, POWDER, FOR SOLUTION INTRAVENOUS at 03:48

## 2017-11-03 RX ADMIN — PROPOFOL 200 MG: 10 INJECTION, EMULSION INTRAVENOUS at 07:31

## 2017-11-03 RX ADMIN — INSULIN LISPRO 1 UNITS: 100 INJECTION, SOLUTION INTRAVENOUS; SUBCUTANEOUS at 21:07

## 2017-11-03 RX ADMIN — VANCOMYCIN HYDROCHLORIDE 1500 MG: 1 INJECTION, POWDER, LYOPHILIZED, FOR SOLUTION INTRAVENOUS at 01:37

## 2017-11-03 RX ADMIN — ONDANSETRON 4 MG: 2 INJECTION INTRAMUSCULAR; INTRAVENOUS at 07:35

## 2017-11-03 RX ADMIN — ACETAMINOPHEN 650 MG: 325 TABLET ORAL at 04:51

## 2017-11-03 RX ADMIN — ACETAMINOPHEN 650 MG: 325 TABLET ORAL at 17:45

## 2017-11-03 RX ADMIN — AMPICILLIN SODIUM AND SULBACTAM SODIUM 3 G: 2; 1 INJECTION, POWDER, FOR SOLUTION INTRAMUSCULAR; INTRAVENOUS at 17:45

## 2017-11-03 RX ADMIN — LIDOCAINE HYDROCHLORIDE 20 MG: 10 INJECTION, SOLUTION INFILTRATION; PERINEURAL at 07:31

## 2017-11-03 RX ADMIN — CEFTRIAXONE SODIUM 1000 MG: 10 INJECTION, POWDER, FOR SOLUTION INTRAVENOUS at 13:04

## 2017-11-03 RX ADMIN — NYSTATIN AND TRIAMCINOLONE ACETONIDE 1 APPLICATION: 100000; 1 CREAM TOPICAL at 10:04

## 2017-11-03 RX ADMIN — METRONIDAZOLE 500 MG: 500 TABLET ORAL at 13:04

## 2017-11-03 RX ADMIN — POTASSIUM CHLORIDE 20 MEQ: 1500 TABLET, EXTENDED RELEASE ORAL at 11:37

## 2017-11-03 RX ADMIN — PIPERACILLIN SODIUM,TAZOBACTAM SODIUM 4.5 G: 4; .5 INJECTION, POWDER, FOR SOLUTION INTRAVENOUS at 10:09

## 2017-11-03 RX ADMIN — DEXAMETHASONE SODIUM PHOSPHATE 4 MG: 10 INJECTION INTRAMUSCULAR; INTRAVENOUS at 07:35

## 2017-11-03 NOTE — PLAN OF CARE
Problem: PHYSICAL THERAPY ADULT  Goal: Performs mobility at highest level of function for planned discharge setting  See evaluation for individualized goals  Treatment/Interventions: Functional transfer training, LE strengthening/ROM, Elevations, Therapeutic exercise, Endurance training, Cognitive reorientation, Patient/family training, Equipment eval/education, Bed mobility, Gait training, Spoke to nursing  Equipment Recommended: Miranda Bolton (for level surfaces, possibly crutches for stairs to enter)       See flowsheet documentation for full assessment, interventions and recommendations  Outcome: Progressing  Prognosis: Fair  Problem List: Decreased strength, Decreased range of motion, Decreased endurance, Impaired balance, Decreased mobility, Decreased safety awareness, Orthopedic restrictions, Decreased skin integrity, Impaired vision  Assessment: pt performed le therapeutic exercise hep issued reviewed for sitting or supine  bed mobilty MI c bed flat  elevated height of bed as home for transfers minimal S A 1  ambulation c roller walker nwb compliant t/o sit rest in wc for trial in PT dept for stair training c axullary crutches c rial and 1 crutch x 2  first trial moderate A c gait belt  second trial minimal A  review of backward trial c roller walker 1 step if to sit on chair on porch or in home prior or for last step into home  no dizziness or pain  donned darco shoe l foot and sneaker r foot for session  removed ending  A back to bed MI  pillow under lle,call gatica at hand  will d/c home c possible home PT  Barriers to Discharge:  (HUSEYIN)  Barriers to Discharge Comments: did steps 1 rail 1 crutch 3 twice A 1  to have someone build second rail possible prior to d/c   Recommendation: Home with family support, Home PT     PT - OK to Discharge: Yes    See flowsheet documentation for full assessment

## 2017-11-03 NOTE — ASSESSMENT & PLAN NOTE
· Severe sepsis, POA, as evidenced by leukocytosis, tachycardia and elevated lactic acid  · Tachycardia resolved  Leukocytosis improving  · Elevated lactic acid resolved, 0 9  · Initial blood cultures: 1/2 showing gram + cocci in clusters, possible contaminant vs bacteremia  · Repeat blood cultures pending  · Currently on vancomycin and zosyn for left foot gas gangrene  · ID following, appreciate input

## 2017-11-03 NOTE — ANESTHESIA PREPROCEDURE EVALUATION
Review of Systems/Medical History  Patient summary reviewed  Chart reviewed      Cardiovascular  EKG reviewed, Hypertension controlled,    Pulmonary  Smoker cigar smoker , ,        GI/Hepatic  Negative GI/hepatic ROS          Negative  ROS        Endo/Other  Diabetes poorly controlled type 2 Oral agent,      GYN       Hematology  Negative hematology ROS      Musculoskeletal  Negative musculoskeletal ROS        Neurology    Diabetic neuropathy,    Psychology   Negative psychology ROS            Physical Exam    Airway    Mallampati score: II  TM Distance: >3 FB  Neck ROM: full     Dental   Comment: No loose,     Cardiovascular  Rhythm: regular, Rate: normal,     Pulmonary  Breath sounds clear to auscultation,     Other Findings        Anesthesia Plan  ASA Score- 3       Anesthesia Type- IV sedation with anesthesia with ASA Monitors  Additional Monitors:   Airway Plan:           Induction- intravenous  Informed Consent- Anesthetic plan and risks discussed with patient  I personally reviewed this patient with the CRNA  Discussed and agreed on the Anesthesia Plan with the CRNA  Alissa Chavez

## 2017-11-03 NOTE — ASSESSMENT & PLAN NOTE
· XR left foot on admission: Extensive subcutaneous air noted at the 1st toe extending to the 1st metatarsal region along with soft tissue swelling  Correlate for gas gangrene  Comminuted fracture at the 1st distal phalanx proximally  Fracture margins are ill-defined, underlying osteomyelitis is not excluded      · S/p guillotine left hallux amputation with irrigation and drainage on 10/30 by podiatry  · Podiatry following  · Underwent left 1st partial ray amputation this AM    · Monitor VS closely post-op  · Encourage IS   · ID following  On IV vancomycin and zosyn  · Follow-up on repeat blood cultures  · Wound cultures: 4+ Growth of Alcaligenes species, susceptible to Zosyn  Few Colonies of Beta Hemolytic Streptococcus Group G, susceptible to penicillin

## 2017-11-03 NOTE — PROGRESS NOTES
Addendum:  GPC in blood culture turns out to be peptostreptococcus  Given polymicrobial foot infection, I suspect this is true bacteremia  Will change antibiotic regimen to IV Unasyn plus p o  Bactrim  Patient will need 14 days of IV antibiotic, another 9 days  Progress Note - Infectious Disease   Roxanne Bentley 45 y o  male MRN: [id number]  Unit/Bed#: -01 Encounter: [id number]      Impression/Plan:  1   Left foot gas gangrene  Patient is now status post guillotine amputation of the left hallux   Operative culture with GGS, Alcaligenes and anaerobic GNR   Patient is clinically much improved  He is status post repeat I and D earlier today  Per Podiatry note, bone was normal, with surgical cure from prior amputation  -change Zosyn to ceftriaxone/Flagyl     -local wound care  -close Podiatry follow-up  -serial exams  -monitor temperature/WBC  -can transition to p  o  antibiotic in the next few days     2   Severe sepsis-POA   Leukocytosis and tachycardia with elevated lactate   Appears all secondary to 1   No other clear source appreciated   Patient is clinically much improved   Sepsis has resolved   Fever has resolved   WBC near normalized     -antibiotics as above and below  -follow-up blood cultures  -supportive care  -no additional ID workup for now     3   Bacteremia  1 out of 2 blood cultures positive for GPC in clusters  At this point, it is unclear whether this is contaminant or true bacteremia  Patient is already on IV vancomycin and is clinically improved  Will continue IV vancomycin pending final blood culture results and repeat blood cultures  -continue IV vancomycin for now  -repeat vancomycin trough later today   -follow-up on final blood culture results     -follow-up repeat blood cultures      4  Diabetes mellitus-type 2 with hyperglycemia   Very poorly controlled diabetes at this time   -blood sugar control per the primary service     5   Hyponatremia-likely pseudohyponatremia secondary to the hyperglycemia   Improved with better glucose control   -monitor BMP     Discussed with patient in detail regarding the above plan  Discussed with Dr Rani Mccallum slim service      Antibiotics:  Vancomycin/Zosyn 5  POD # 4     Subjective:  Patient is status post repeat foot I and D  Patient feels well  Rabia Quijano has minimal foot pain  Temperature stays down   No chills  Sweats improved  He is tolerating antibiotics well   No nausea, vomiting or diarrhea   No dizziness or hearing loss  Objective:  Vitals:  HR:  [72-91] 87  Resp:  [16-18] 18  BP: (132-166)/(80-93) 159/84  SpO2:  [92 %-97 %] 94 %  Temp (24hrs), Av 1 °F (36 7 °C), Min:97 2 °F (36 2 °C), Max:98 7 °F (37 1 °C)  Current: Temperature: 98 3 °F (36 8 °C)    Physical Exam:     General: Awake, alert, cooperative, no distress  Lungs: Expansion symmetric, no rales, no wheezing, respirations unlabored  Heart[de-identified]  Regular rate and rhythm, S1 and S2 normal, no murmur  Abdomen: Soft, nondistended, non-tender, bowel sounds active all four quadrants,        no masses, no organomegaly  Extremities: Left foot with dressing in place  Dressing is dry  Stable mild foot/leg edema  No warmth  No tenderness  Skin:  No rash  Invasive Devices     Peripheral Intravenous Line            Peripheral IV 10/30/17 Right Antecubital 3 days    Peripheral IV 10/30/17 Right Forearm 3 days    Peripheral IV 17 Left Arm less than 1 day                Labs studies:   I have personally reviewed pertinent labs      Results from last 7 days  Lab Units 17  0550 17  0526   SODIUM mmol/L 141 138 136   POTASSIUM mmol/L 3 3* 3 4* 3 5   CHLORIDE mmol/L 105 105 102   CO2 mmol/L 29 26 26   ANION GAP mmol/L 7 7 8   BUN mg/dL 8 8 8   CREATININE mg/dL 0 97 0 83 0 74   EGFR ml/min/1 73sq m 88 100 105   GLUCOSE RANDOM mg/dL 86 133 179*   CALCIUM mg/dL 8 5 8 3 8 0*       Results from last 7 days  Lab Units 17  0550 11/01/17  0526   WBC Thousand/uL 10 22* 10 28* 10 81*   HEMOGLOBIN g/dL 11 5* 11 1* 11 8*   PLATELETS Thousands/uL 574* 529* 517*       Results from last 7 days  Lab Units 10/30/17  2009 10/30/17  1755 10/30/17  1215   BLOOD CULTURE   --   --  Anaerobic cocci  No Growth at 72 hrs  GRAM STAIN RESULT  No polys seen  4+ Gram negative rods  4+ Gram positive cocci in pairs, chains and clusters  1+ Gram positive rods 1+ Polys  4+ Gram negative rods  4+ Gram positive cocci in pairs, chains and clusters  3+ Gram positive rods Gram positive cocci in clusters   WOUND CULTURE  Few Colonies of Beta Hemolytic Streptococcus Group G*  4+ Growth of  4+ Growth of Alcaligenes species*  4+ Growth of   --        Imaging Studies:   I have personally reviewed pertinent imaging study reports and images in PACS  EKG, Pathology, and Other Studies:   I have personally reviewed pertinent reports

## 2017-11-03 NOTE — OP NOTE
OPERATIVE REPORT  PATIENT NAME: Daren Cortés    :  1965  MRN: [id number]  Pt Location: AN OR ROOM 03    SURGERY DATE: 11/3/2017    Surgeon(s) and Role:     * Leigh Ann Ruiz DPM - Primary     * Nate Calero DPM - Assisting    Preop Diagnosis:  Diabetic foot infection ([address] ) [E11 69, L08 9]  Acute osteomyelitis of toe, left ([address] ) [M86 172]  Gas gangrene of foot ([address] ) [A48 0]    Post-Op Diagnosis Codes:     * Diabetic foot infection ([address] ) [E11 69, L08 9]     * Acute osteomyelitis of toe, left ([address] ) [M86 172]     * Gas gangrene of foot ([address] ) [A48 0]    Procedure:  Left 1st partial ray amputation     Specimen(s):  ID Type Source Tests Collected by Time Destination   1 : left foot 1st metatarsul Tissue Bone TISSUE EXAM Leigh Ann Ruiz DPM 11/3/2017 0750        Estimated Blood Loss:   Minimal    Drains:   None    Hemostasis:  PNAT on left ankle @ 250mmHg for 15 min  Materials:  4-0 nylon    Anesthesia Type:   Choice    Operative Indications:  Diabetic foot infection ([address] ) [E11 69, L08 9]  Acute osteomyelitis of toe, left ([address] ) [M86 172]  Gas gangrene of foot ([address] ) [A48 0]      Operative Findings:  No sinus tracts or purulence noted  Remaining soft tissue and bone appeared bleeding and viable  It is believed that this procedure was a surgical cure  Complications:   None    Procedure and Technique:    Under mild sedation, the patient was brought into the operating room and placed on the operating room table in the supine position  A pneumatic ankle tourniquet was then placed around the patient's left ankle with ample webril padding  A time out was performed to confirm the correct patient, procedure and site with all parties in agreement  Following IV sedation, local anesthetic was obtained about the patient's ankle was performed consisting of 20 ml of 1% Lidocaine and 0 5% Bupivacaine in a 1:1 mixture  The foot was then scrubbed, prepped and draped in the usual aseptic manner   An esmarch bandage was utilized to exsangunate the patients foot and the pneumatic ankle tourniquet was then inflated  The esmarch bandage was removed and the foot was placed on the operating room table  Attention was directed to the left 1 digit where the first metatarsal head was exposed  Utilizing a sterile 15 blade, a lazy-s incision was made proximally, and carried deep straight to bone  Soft tissue structures were then reflected off the 1st metatarsal head  A sagittal saw was then utilized to make a transverse cut in the proximal aspect of the 1st metatarsal head  The 1st metatarsal was then passed off to the back table and sent in formalin to pathology  Skin edges of the wound were then excisionally debrided to bleeding tissue utilizing a sterile 15  All visible nonviable, and necrotic soft tissue and skin was excisionally debrided from the wound bed and skin edges with a sterile 15 blade  It was noted that all tissue margins were bleeding and viable  No deep sinus tracts or areas of purulence were visualized  The remaining bone on the proximal aspect of the cut was noted to be of hard and viable quality  Any remaining tendinous structures were identified and severed proximally to remove any nidus of infection  The pneumatic ankle tourniquet was deflated and normal hyperemic flush was noted to all digits  The wound was then cleansed with copious amount of sterile saline  Skin closure was then obtained with 4-0 Nylon placed in a horizontal and simple type of fashion  The incision site was then dressed with adaptic, DSD, ABD, and ACE wrap  The patient tolerated the procedure and anesthesia well and was transported to the PACU with vital signs stable          I was present for the entire procedure    Patient Disposition:  PACU     SIGNATURE: Cheryl Kay DPM  DATE: November 3, 2017  TIME: 8:29 AM

## 2017-11-03 NOTE — ANESTHESIA POSTPROCEDURE EVALUATION
Post-Op Assessment Note      CV Status:  Stable    Mental Status:  Alert and awake    Hydration Status:  Stable and euvolemic    PONV Controlled:  Controlled    Airway Patency:  Patent and adequate    Post Op Vitals Reviewed: Yes          Staff: CRNA           BP   139/85   Temp      Pulse  74   Resp   20   SpO2   97

## 2017-11-03 NOTE — PHYSICAL THERAPY NOTE
PHYSICAL THERAPY NOTE          Patient Name: Chris Costello  YZEDC'F Date: 11/3/2017     11/03/17 1500   Pain Assessment   Pain Assessment No/denies pain   Pain Score No Pain   Restrictions/Precautions   Weight Bearing Precautions Per Order Yes   LLE Weight Bearing Per Order NWB   Other Precautions WBS; Fall Risk  (nurse disconnected iv for mobiltiy )   General   Chart Reviewed Yes   Response to Previous Treatment Patient with no complaints from previous session  Family/Caregiver Present No   Cognition   Overall Cognitive Status WFL   Arousal/Participation Alert; Cooperative;Arousable  (nurse awakened pt)   Attention Within functional limits   Orientation Level Oriented X4   Following Commands Follows one step commands without difficulty   Subjective   Subjective no pain  no dizziness  ok for PT   Bed Mobility   Rolling R 6  Modified independent   Additional items Assist x 1   Supine to Sit 6  Modified independent   Sit to Supine 6  Modified independent  (elevated height of bed as he has home(is 6'4" tall)   Additional items Assist x 1   Transfers   Sit to Stand 5  Supervision   Additional items Assist x 1;Bedrails;Armrests; Increased time required;Verbal cues  (added pillow in wc for ease  x3 trials)   Stand to Sit 5  Supervision   Additional items Assist x 1;Bedrails;Armrests; Verbal cues  (at times not reaching back+ quick descent education  )   Ambulation/Elevation   Gait pattern Forward Flexion;Decreased R stance; Excessively slow   Gait Assistance 5  Supervision   Additional items Assist x 1;Verbal cues; Tactile cues   Assistive Device Rolling walker   Distance 25 feet sit rest in wc  ticket to ride to PT dept for stair training as per dpt yossi sandhu'effie 5 feet x 2 for stair training x 2 stand rest  sit rest  ending 20 feet to bed    Stair Management Assistance 3  Moderate assist   Additional items Assist x 1;Verbal cues; Tactile cues;Other (Comment); Increased time required  (gait belt used ,1 crutch and 1 rail)   Stair Management Technique One rail L;Nonreciprocal;Foreward; With crutches; Other (Comment)  (I also demonstrated if need hop 1 step backward c rw and sit)   Number of Stairs 6  (3 steps x 2 c stand rests  second trial improved to [place])   Wheelchair Activities   Wheelchair Cushion Standard   Balance   Static Sitting Good   Static Standing Fair   Dynamic Standing Fair -   Ambulatory Fair +   Endurance Deficit   Endurance Deficit Yes   Activity Tolerance   Activity Tolerance Patient limited by fatigue   Nurse Made Aware alhaji setup ticket to ride,disconnected lynnette T[place] dpt cleared for trial of stair training c crutches since saw pt in room earlier and pt had l toe closure in or  wb remains nwb darco shoe donned for session l foot and I A don r sneaker   Exercises   Quad Sets 5 reps; Supine;AROM; Bilateral   Heelslides Supine;5 reps;AROM; Bilateral   Glute Sets Supine;5 reps;AROM; Bilateral   Hip Flexion Supine;5 reps;AROM; Bilateral   Hip Abduction Supine;5 reps;AROM; Bilateral   Hip Extension Supine;5 reps;AROM; Bilateral   Hip Adduction Supine;10 reps;AROM; Bilateral   Knee AROM Short Arc Quad Supine;5 reps;AROM; Bilateral   Ankle Pumps Supine;5 reps;Right;AROM   Assessment   Prognosis Fair   Problem List Decreased strength;Decreased range of motion;Decreased endurance; Impaired balance;Decreased mobility; Decreased safety awareness;Orthopedic restrictions;Decreased skin integrity; Impaired vision   Assessment pt performed le therapeutic exercise hep issued reviewed for sitting or supine  bed mobilty MI c bed flat  elevated height of bed as home for transfers minimal S A 1  ambulation c roller walker nwb compliant t/o sit rest in wc for trial in PT dept for stair training c axullary crutches c rial and 1 crutch x 2  first trial moderate A c gait belt  second trial minimal A  review of backward trial c roller walker 1 step if to sit on chair on porch or in home prior or for last step into home  no dizziness or pain  donned darco shoe l foot and sneaker r foot for session  removed ending  A back to bed MI  pillow under lle,call gatica at hand  will d/c home c possible home PT   Barriers to Discharge Comments did steps 1 rail 1 crutch 3 twice A 1  to have someone build second rail possible prior to d/c    Goals   Patient Goals do therapy   STG Expiration Date 11/17/17   Treatment Day 2   Plan   Treatment/Interventions Functional transfer training;LE strengthening/ROM; Elevations; Therapeutic exercise; Endurance training;Patient/family training;Equipment eval/education; Bed mobility;Gait training; Compensatory technique education;Spoke to nursing  (sheree sy cleared for session today by PTA)   Progress Progressing toward goals   PT Frequency 5x/wk   Recommendation   Recommendation Home with family support;Home PT   Equipment Recommended Other (Comment); Walker  (rw)   PT - OK to Discharge Yes

## 2017-11-03 NOTE — CASE MANAGEMENT
Continued Stay Review    Date: 11/03/2017    Vital Signs: BP (!) 174/92   Pulse 94   Temp 98 2 °F (36 8 °C) (Oral)   Resp 18   Ht 6' 3" (1 905 m)   Wt 107 kg (236 lb)   SpO2 92%   BMI 29 50 kg/m²     Medications:   Scheduled Meds:   acetaminophen 650 mg Oral Q6H [facility]   cefTRIAXone 1,000 mg Intravenous Q24H   enoxaparin 40 mg Subcutaneous Daily   insulin glargine 40 Units Subcutaneous HS   insulin lispro 1-5 Units Subcutaneous HS   insulin lispro 1-6 Units Subcutaneous TID AC   insulin lispro 15 Units Subcutaneous TID With Meals   lisinopril 10 mg Oral Daily   melatonin 6 mg Oral HS   metroNIDAZOLE 500 mg Oral Q8H [facility]   nicotine 1 patch Transdermal Daily   nystatin-triamcinolone  Topical BID   vancomycin 15 mg/kg Intravenous Q8H     Continuous Infusions:   sodium chloride 125 mL/hr Last Rate: 125 mL/hr (11/03/17 0001)     PRN Meds: ALPRAZolam    influenza vaccine    morphine injection    ondansetron    oxyCODONE    traMADol    Abnormal Labs/Diagnostic Results: K 3 3    WBC Thousand/uL 10 22*   HEMOGLOBIN g/dL 11 5*   HEMATOCRIT % 35 0*   PLATELETS Thousands/uL 574*         Age/Sex: 45 y o  male     Assessment/Plan:     * Diabetic ulcer of left foot associated with diabetes mellitus due to underlying condition, with necrosis of muscle (HCC)   Assessment & Plan     · XR left foot on admission: Extensive subcutaneous air noted at the 1st toe extending to the 1st metatarsal region along with soft tissue swelling  Correlate for gas gangrene  Comminuted fracture at the 1st distal phalanx proximally  Fracture margins are ill-defined, underlying osteomyelitis is not excluded      ? S/p ayaan left hallux amputation with irrigation and drainage on 10/30 by podiatry  · Podiatry following  ? Underwent left 1st partial ray amputation this AM    ? Monitor VS closely post-op  ? Encourage IS   · ID following  On IV vancomycin and zosyn  ? Follow-up on repeat blood cultures     § Wound cultures: 4+ Growth of Alcaligenes species, susceptible to Zosyn  Few Colonies of Beta Hemolytic Streptococcus Group G, susceptible to penicillin         Sepsis (HCC)   Assessment & Plan     · Severe sepsis, POA, as evidenced by leukocytosis, tachycardia and elevated lactic acid  ? Tachycardia resolved  Leukocytosis improving  ? Elevated lactic acid resolved, 0 9  · Initial blood cultures: 1/2 showing gram + cocci in clusters, possible contaminant vs bacteremia  ? Repeat blood cultures pending  · Currently on vancomycin and zosyn for left foot gas gangrene  ? ID following, appreciate input           Uncontrolled type 2 diabetes mellitus with foot ulcer, without long-term current use of insulin ([facility])   Assessment & Plan     · Last A1c 10 3%  Patient was on metformin prior to admission  · Blood sugars well controlled over the past 24 hours  ? Continue current insulin regimen with Lantus 40 units hs and Humalog 15 units TID with meals  § SSI for additional coverage  § Continue to monitor accu-checks ac and hs  · Outpatient follow-up with endocrinology in 2 weeks after d/c           Hypokalemia   Assessment & Plan     · Replete  · Repeat BMP in AM         Essential hypertension   Assessment & Plan     · Continue to monitor BP  · Continue lisinopril 10 mg              VTE Pharmacologic Prophylaxis:   Pharmacologic: Enoxaparin (Lovenox)  Mechanical VTE Prophylaxis in Place:  Yes      Current Length of Stay: 4 day(s)     Current Patient Status: Inpatient   Certification Statement: The patient will continue to require additional inpatient hospital stay due to awaiting results of repeat blood cultures, need for IV antibioitcs      Discharge Plan: When medically stable      Code Status: Level 1 - Full Code

## 2017-11-03 NOTE — PROGRESS NOTES
Progress Note - Trace Manning 45 y o  male MRN: [id number]    Unit/Bed#: -01 Encounter: [id number]        * Diabetic ulcer of left foot associated with diabetes mellitus due to underlying condition, with necrosis of muscle (Formerly [facility])   Assessment & Plan    · XR left foot on admission: Extensive subcutaneous air noted at the 1st toe extending to the 1st metatarsal region along with soft tissue swelling  Correlate for gas gangrene  Comminuted fracture at the 1st distal phalanx proximally  Fracture margins are ill-defined, underlying osteomyelitis is not excluded      · S/p guillotine left hallux amputation with irrigation and drainage on 10/30 by podiatry  · Podiatry following  · Underwent left 1st partial ray amputation this AM    · Monitor VS closely post-op  · Encourage IS   · ID following  On IV vancomycin and zosyn  · Follow-up on repeat blood cultures  · Wound cultures: 4+ Growth of Alcaligenes species, susceptible to Zosyn  Few Colonies of Beta Hemolytic Streptococcus Group G, susceptible to penicillin  Sepsis ([facility] [address] )   Assessment & Plan    · Severe sepsis, POA, as evidenced by leukocytosis, tachycardia and elevated lactic acid  · Tachycardia resolved  Leukocytosis improving  · Elevated lactic acid resolved, 0 9  · Initial blood cultures: 1/2 showing gram + cocci in clusters, possible contaminant vs bacteremia  · Repeat blood cultures pending  · Currently on vancomycin and zosyn for left foot gas gangrene  · ID following, appreciate input  Uncontrolled type 2 diabetes mellitus with foot ulcer, without long-term current use of insulin (Formerly [facility])   Assessment & Plan    · Last A1c 10 3%  Patient was on metformin prior to admission  · Blood sugars well controlled over the past 24 hours  · Continue current insulin regimen with Lantus 40 units hs and Humalog 15 units TID with meals  · SSI for additional coverage  · Continue to monitor accu-checks ac and hs     · Outpatient follow-up with endocrinology in 2 weeks after d/c  Hypokalemia   Assessment & Plan    · Replete  · Repeat BMP in AM          Essential hypertension   Assessment & Plan    · Continue to monitor BP  · Continue lisinopril 10 mg  VTE Pharmacologic Prophylaxis:   Pharmacologic: Enoxaparin (Lovenox)  Mechanical VTE Prophylaxis in Place: Yes    Patient Centered Rounds: I have performed bedside rounds with nursing staff today  Discussions with Specialists or Other Care Team Provider: Nursing    Education and Discussions with Family / Patient: Patient    Time Spent for Care: 20 minutes  More than 50% of total time spent on counseling and coordination of care as described above  Current Length of Stay: 4 day(s)    Current Patient Status: Inpatient   Certification Statement: The patient will continue to require additional inpatient hospital stay due to awaiting results of repeat blood cultures, need for IV antibioitcs  Discharge Plan: When medically stable  Code Status: Level 1 - Full Code      Subjective:   Patient sitting up in bed, eating breakfast  No complaints at this time  Underwent left 1st partial ray amputation this AM  He denies SOB, chest pain, n/v/d, chills, lightheadedness or dizziness  Objective:     Vitals:   Temp (24hrs), Av 1 °F (36 7 °C), Min:97 2 °F (36 2 °C), Max:98 7 °F (37 1 °C)    HR:  [72-91] 87  Resp:  [16-18] 18  BP: (132-166)/(80-93) 159/84  SpO2:  [92 %-97 %] 94 %  Body mass index is 29 5 kg/m²  Input and Output Summary (last 24 hours): Intake/Output Summary (Last 24 hours) at 17 1058  Last data filed at 17 0810   Gross per 24 hour   Intake             1780 ml   Output                0 ml   Net             1780 ml       Physical Exam:     Physical Exam   Constitutional: He is oriented to person, place, and time  No distress  HENT:   Head: Normocephalic and atraumatic  Eyes: Conjunctivae are normal    Neck: Normal range of motion  Cardiovascular: Normal rate and regular rhythm  Pulmonary/Chest: Effort normal and breath sounds normal    Abdominal: Soft  Bowel sounds are normal  There is no tenderness  Musculoskeletal: Normal range of motion  He exhibits no edema  Neurological: He is alert and oriented to person, place, and time  Skin: Skin is warm and dry  He is not diaphoretic  Left foot with dressing in place  Psychiatric: He has a normal mood and affect  His behavior is normal    Nursing note and vitals reviewed  Additional Data:     Labs:      Results from last 7 days  Lab Units 11/03/17  0451 11/02/17  0550   WBC Thousand/uL 10 22* 10 28*   HEMOGLOBIN g/dL 11 5* 11 1*   HEMATOCRIT % 35 0* 33 8*   PLATELETS Thousands/uL 574* 529*   NEUTROS PCT %  --  68   LYMPHS PCT %  --  21   MONOS PCT %  --  6   EOS PCT %  --  4       Results from last 7 days  Lab Units 11/03/17  0451   SODIUM mmol/L 141   POTASSIUM mmol/L 3 3*   CHLORIDE mmol/L 105   CO2 mmol/L 29   BUN mg/dL 8   CREATININE mg/dL 0 97   CALCIUM mg/dL 8 5   GLUCOSE RANDOM mg/dL 86           * I Have Reviewed All Lab Data Listed Above  * Additional Pertinent Lab Tests Reviewed: All Labs Within Last 24 Hours Reviewed    Imaging:    Imaging Reports Reviewed Today Include:n/a    Recent Cultures (last 7 days):       Results from last 7 days  Lab Units 10/30/17  2009 10/30/17  1755 10/30/17  1215   BLOOD CULTURE   --   --  No Growth at 72 hrs     GRAM STAIN RESULT  No polys seen  4+ Gram negative rods  4+ Gram positive cocci in pairs, chains and clusters  1+ Gram positive rods 1+ Polys  4+ Gram negative rods  4+ Gram positive cocci in pairs, chains and clusters  3+ Gram positive rods Gram positive cocci in clusters   WOUND CULTURE  Few Colonies of Beta Hemolytic Streptococcus Group G*  4+ Growth of  4+ Growth of Alcaligenes species*  4+ Growth of   --        Last 24 Hours Medication List:     acetaminophen 650 mg Oral Q6H OG   enoxaparin 40 mg Subcutaneous Daily insulin glargine 40 Units Subcutaneous HS   insulin lispro 1-5 Units Subcutaneous HS   insulin lispro 1-6 Units Subcutaneous TID AC   insulin lispro 15 Units Subcutaneous TID With Meals   lisinopril 10 mg Oral Daily   melatonin 6 mg Oral HS   nicotine 1 patch Transdermal Daily   nystatin-triamcinolone  Topical BID   piperacillin-tazobactam 4 5 g Intravenous Q6H   potassium chloride 20 mEq Oral Once   vancomycin 15 mg/kg Intravenous Q8H        Today, Patient Was Seen By: Pranay London PA-C    ** Please Note: Dictation voice to text software may have been used in the creation of this document   **

## 2017-11-03 NOTE — ASSESSMENT & PLAN NOTE
· Last A1c 10 3%  Patient was on metformin prior to admission  · Blood sugars well controlled over the past 24 hours  · Continue current insulin regimen with Lantus 40 units hs and Humalog 15 units TID with meals  · SSI for additional coverage  · Continue to monitor accu-checks ac and hs  · Outpatient follow-up with endocrinology in 2 weeks after d/c

## 2017-11-04 PROBLEM — E87.6 HYPOKALEMIA: Status: RESOLVED | Noted: 2017-11-03 | Resolved: 2017-11-04

## 2017-11-04 LAB
ANION GAP SERPL CALCULATED.3IONS-SCNC: 8 MMOL/L (ref 4–13)
BACTERIA BLD CULT: ABNORMAL
BACTERIA BLD CULT: NORMAL
BUN SERPL-MCNC: 11 MG/DL (ref 5–25)
CALCIUM SERPL-MCNC: 8.4 MG/DL (ref 8.3–10.1)
CHLORIDE SERPL-SCNC: 103 MMOL/L (ref 100–108)
CO2 SERPL-SCNC: 27 MMOL/L (ref 21–32)
CREAT SERPL-MCNC: 0.9 MG/DL (ref 0.6–1.3)
ERYTHROCYTE [DISTWIDTH] IN BLOOD BY AUTOMATED COUNT: 12.1 % (ref 11.6–15.1)
GFR SERPL CREATININE-BSD FRML MDRD: 96 ML/MIN/1.73SQ M
GLUCOSE SERPL-MCNC: 123 MG/DL (ref 65–140)
GLUCOSE SERPL-MCNC: 133 MG/DL (ref 65–140)
GLUCOSE SERPL-MCNC: 151 MG/DL (ref 65–140)
GLUCOSE SERPL-MCNC: 153 MG/DL (ref 65–140)
GLUCOSE SERPL-MCNC: 183 MG/DL (ref 65–140)
GRAM STN SPEC: ABNORMAL
HCT VFR BLD AUTO: 33.8 % (ref 36.5–49.3)
HGB BLD-MCNC: 11 G/DL (ref 12–17)
MCH RBC QN AUTO: 28.6 PG (ref 26.8–34.3)
MCHC RBC AUTO-ENTMCNC: 32.5 G/DL (ref 31.4–37.4)
MCV RBC AUTO: 88 FL (ref 82–98)
PLATELET # BLD AUTO: 527 THOUSANDS/UL (ref 149–390)
PMV BLD AUTO: 8.1 FL (ref 8.9–12.7)
POTASSIUM SERPL-SCNC: 3.6 MMOL/L (ref 3.5–5.3)
RBC # BLD AUTO: 3.84 MILLION/UL (ref 3.88–5.62)
SODIUM SERPL-SCNC: 138 MMOL/L (ref 136–145)
WBC # BLD AUTO: 12.07 THOUSAND/UL (ref 4.31–10.16)

## 2017-11-04 PROCEDURE — 80048 BASIC METABOLIC PNL TOTAL CA: CPT | Performed by: PODIATRIST

## 2017-11-04 PROCEDURE — 82948 REAGENT STRIP/BLOOD GLUCOSE: CPT

## 2017-11-04 PROCEDURE — 85027 COMPLETE CBC AUTOMATED: CPT | Performed by: PODIATRIST

## 2017-11-04 RX ADMIN — INSULIN LISPRO 1 UNITS: 100 INJECTION, SOLUTION INTRAVENOUS; SUBCUTANEOUS at 07:56

## 2017-11-04 RX ADMIN — ALPRAZOLAM 0.5 MG: 0.5 TABLET ORAL at 08:45

## 2017-11-04 RX ADMIN — SULFAMETHOXAZOLE AND TRIMETHOPRIM 1 TABLET: 800; 160 TABLET ORAL at 21:36

## 2017-11-04 RX ADMIN — ACETAMINOPHEN 650 MG: 325 TABLET ORAL at 16:51

## 2017-11-04 RX ADMIN — ACETAMINOPHEN 650 MG: 325 TABLET ORAL at 05:25

## 2017-11-04 RX ADMIN — NYSTATIN AND TRIAMCINOLONE ACETONIDE 1 APPLICATION: 100000; 1 CREAM TOPICAL at 08:44

## 2017-11-04 RX ADMIN — LISINOPRIL 10 MG: 10 TABLET ORAL at 08:43

## 2017-11-04 RX ADMIN — ENOXAPARIN SODIUM 40 MG: 40 INJECTION SUBCUTANEOUS at 08:43

## 2017-11-04 RX ADMIN — AMPICILLIN SODIUM AND SULBACTAM SODIUM 3 G: 2; 1 INJECTION, POWDER, FOR SOLUTION INTRAMUSCULAR; INTRAVENOUS at 03:48

## 2017-11-04 RX ADMIN — ACETAMINOPHEN 650 MG: 325 TABLET ORAL at 12:52

## 2017-11-04 RX ADMIN — SULFAMETHOXAZOLE AND TRIMETHOPRIM 1 TABLET: 800; 160 TABLET ORAL at 08:43

## 2017-11-04 RX ADMIN — AMPICILLIN SODIUM AND SULBACTAM SODIUM 3 G: 2; 1 INJECTION, POWDER, FOR SOLUTION INTRAMUSCULAR; INTRAVENOUS at 10:02

## 2017-11-04 RX ADMIN — MELATONIN 6 MG: 3 TAB ORAL at 21:36

## 2017-11-04 RX ADMIN — AMPICILLIN SODIUM AND SULBACTAM SODIUM 3 G: 2; 1 INJECTION, POWDER, FOR SOLUTION INTRAMUSCULAR; INTRAVENOUS at 16:50

## 2017-11-04 RX ADMIN — AMPICILLIN SODIUM AND SULBACTAM SODIUM 3 G: 2; 1 INJECTION, POWDER, FOR SOLUTION INTRAMUSCULAR; INTRAVENOUS at 21:37

## 2017-11-04 RX ADMIN — INSULIN GLARGINE 40 UNITS: 100 INJECTION, SOLUTION SUBCUTANEOUS at 21:36

## 2017-11-04 NOTE — ASSESSMENT & PLAN NOTE
· Severe sepsis, POA, as evidenced by leukocytosis, tachycardia and elevated lactic acid  · Tachycardia resolved  WBC increased to 12 07 post-operatively  Patient afebrile  · Elevated lactic acid resolved, 0 9   · ID following, appreciate input  · Initial blood cultures: 1/2 showing peptostreptococcus, likely bacteremia  · Repeat blood cultures x 2: no growth at 24 hours  · Continue IV Unasyn, will need a total of 14 days of IV antibiotics per ID  Continue PO Bactrim

## 2017-11-04 NOTE — PROGRESS NOTES
Insulin Pen instructions reviewed  Patient verbalized understanding  Practiced using pen, pt says he feels comfortable with the concept  Encouraged him to ask for the pen to practice more before discharge

## 2017-11-04 NOTE — ASSESSMENT & PLAN NOTE
· XR left foot on admission: Extensive subcutaneous air noted at the 1st toe extending to the 1st metatarsal region along with soft tissue swelling  Correlate for gas gangrene  Comminuted fracture at the 1st distal phalanx proximally  Fracture margins are ill-defined, underlying osteomyelitis is not excluded      · Podiatry following  · S/p guillotine left hallux amputation with irrigation and drainage on 10/30 by podiatry  · S/p left 1st partial ray amputation on 11/3/17  · Encourage IS   · ID following  · Initial blood cultures: 1/2 positive for peptostreptococcus prevotii  · Antibiotics changed to IV Unasyn and PO Bactrim  Needs 14 days of IV antibiotics per ID  Likely will need PICC line placed  · Repeat blood cultures x 2: no growth at 24 hours  · Wound cultures: Alcaligenes species, Beta Hemolytic Streptococcus Group G and anaerobic GNR

## 2017-11-04 NOTE — PROGRESS NOTES
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, with necrosis of muscle (Formerly [facility])   Assessment & Plan    · XR left foot on admission: Extensive subcutaneous air noted at the 1st toe extending to the 1st metatarsal region along with soft tissue swelling  Correlate for gas gangrene  Comminuted fracture at the 1st distal phalanx proximally  Fracture margins are ill-defined, underlying osteomyelitis is not excluded      · Podiatry following  · S/p guillotine left hallux amputation with irrigation and drainage on 10/30 by podiatry  · S/p left 1st partial ray amputation on 11/3/17  · Encourage IS   · ID following  · Initial blood cultures: 1/2 positive for peptostreptococcus prevotii  · Antibiotics changed to IV Unasyn and PO Bactrim  Needs 14 days of IV antibiotics per ID  Likely will need PICC line placed  · Repeat blood cultures x 2: no growth at 24 hours  · Wound cultures: Alcaligenes species, Beta Hemolytic Streptococcus Group G and anaerobic GNR  Sepsis ([address] )   Assessment & Plan    · Severe sepsis, POA, as evidenced by leukocytosis, tachycardia and elevated lactic acid  · Tachycardia resolved  WBC increased to 12 07 post-operatively  Patient afebrile  · Elevated lactic acid resolved, 0 9   · ID following, appreciate input  · Initial blood cultures: 1/2 showing peptostreptococcus, likely bacteremia  · Repeat blood cultures x 2: no growth at 24 hours  · Continue IV Unasyn, will need a total of 14 days of IV antibiotics per ID  Continue PO Bactrim  Uncontrolled type 2 diabetes mellitus with foot ulcer, without long-term current use of insulin (Formerly [facility])   Assessment & Plan    · Last A1c 10 3%  Patient was on metformin prior to admission  · Blood sugars well controlled over the past 24 hours    · Continue current insulin regimen with Lantus 40 units hs and Humalog 15 units TID with meals  · SSI for additional coverage  · Continue to monitor accu-checks ac and hs  · Outpatient follow-up with endocrinology in 2 weeks after d/c  Essential hypertension   Assessment & Plan    · Continue to monitor BP  · Continue lisinopril 10 mg  VTE Pharmacologic Prophylaxis:   Pharmacologic: Enoxaparin (Lovenox)  Mechanical VTE Prophylaxis in Place: Yes    Patient Centered Rounds: I have performed bedside rounds with nursing staff today  Discussions with Specialists or Other Care Team Provider: Nursing    Education and Discussions with Family / Patient: Patient and patient's wife at bedside  Time Spent for Care: 20 minutes  More than 50% of total time spent on counseling and coordination of care as described above  Current Length of Stay: 5 day(s)    Current Patient Status: Inpatient   Certification Statement: The patient will continue to require additional inpatient hospital stay due to need for IV antibiotics for bacteremia    Discharge Plan: Patient will likely need PICC line placed on Monday  Code Status: Level 1 - Full Code      Subjective:   Patient sitting up in bed, no complaints at this time  He denies lower extremity pain, chest pain or SOB  Appetite good  Objective:     Vitals:   Temp (24hrs), Av 2 °F (36 8 °C), Min:97 8 °F (36 6 °C), Max:98 5 °F (36 9 °C)    HR:  [81-96] 81  Resp:  [18] 18  BP: (139-171)/(83-90) 139/83  SpO2:  [91 %-96 %] 95 %  Body mass index is 29 5 kg/m²  Input and Output Summary (last 24 hours): Intake/Output Summary (Last 24 hours) at 17 1426  Last data filed at 17 0738   Gross per 24 hour   Intake              880 ml   Output                0 ml   Net              880 ml       Physical Exam:     Physical Exam   Constitutional: He is oriented to person, place, and time  No distress  HENT:   Head: Normocephalic and atraumatic     Eyes: Conjunctivae are normal  Neck: Neck supple  Cardiovascular: Normal rate and regular rhythm  Pulmonary/Chest: Effort normal and breath sounds normal  No respiratory distress  Abdominal: Soft  Bowel sounds are normal  There is no tenderness  Musculoskeletal: Normal range of motion  He exhibits edema (left lower extremity)  Neurological: He is alert and oriented to person, place, and time  Skin: Skin is warm and dry  No erythema  Left lower extremity with dry dressing in place  Psychiatric: He has a normal mood and affect  Nursing note and vitals reviewed  Additional Data:     Labs:      Results from last 7 days  Lab Units 11/04/17  0521  11/02/17  0550   WBC Thousand/uL 12 07*  < > 10 28*   HEMOGLOBIN g/dL 11 0*  < > 11 1*   HEMATOCRIT % 33 8*  < > 33 8*   PLATELETS Thousands/uL 527*  < > 529*   NEUTROS PCT %  --   --  68   LYMPHS PCT %  --   --  21   MONOS PCT %  --   --  6   EOS PCT %  --   --  4   < > = values in this interval not displayed  Results from last 7 days  Lab Units 11/04/17  0521   SODIUM mmol/L 138   POTASSIUM mmol/L 3 6   CHLORIDE mmol/L 103   CO2 mmol/L 27   BUN mg/dL 11   CREATININE mg/dL 0 90   CALCIUM mg/dL 8 4   GLUCOSE RANDOM mg/dL 151*           * I Have Reviewed All Lab Data Listed Above  * Additional Pertinent Lab Tests Reviewed: All Labs Within Last 24 Hours Reviewed    Imaging:    Imaging Reports Reviewed Today Include: None  Imaging Personally Reviewed by Myself Includes:  None    Recent Cultures (last 7 days):       Results from last 7 days  Lab Units 11/02/17  1057 10/30/17  2009 10/30/17  1755 10/30/17  1215   BLOOD CULTURE  No Growth at 24 hrs  No Growth at 24 hrs   --   --    Peptostreptococcus prevotii*  No Growth After 4 Days     GRAM STAIN RESULT   --  No polys seen  4+ Gram negative rods  4+ Gram positive cocci in pairs, chains and clusters  1+ Gram positive rods 1+ Polys  4+ Gram negative rods  4+ Gram positive cocci in pairs, chains and clusters  3+ Gram positive rods Gram positive cocci in clusters   WOUND CULTURE   --  Few Colonies of Beta Hemolytic Streptococcus Group G*  4+ Growth of  4+ Growth of Alcaligenes species*  4+ Growth of   --        Last 24 Hours Medication List:     acetaminophen 650 mg Oral Q6H [address]   ampicillin-sulbactam 3 g Intravenous Q6H   enoxaparin 40 mg Subcutaneous Daily   insulin glargine 40 Units Subcutaneous HS   insulin lispro 1-5 Units Subcutaneous HS   insulin lispro 1-6 Units Subcutaneous TID AC   insulin lispro 15 Units Subcutaneous TID With Meals   lisinopril 10 mg Oral Daily   melatonin 6 mg Oral HS   nicotine 1 patch Transdermal Daily   nystatin-triamcinolone  Topical BID   sulfamethoxazole-trimethoprim 1 tablet Oral Q12H [address]        Today, Patient Was Seen By: Elana Reyes PA-C    ** Please Note: Dictation voice to text software may have been used in the creation of this document   **

## 2017-11-05 ENCOUNTER — APPOINTMENT (INPATIENT)
Dept: NON INVASIVE DIAGNOSTICS | Facility: HOSPITAL | Age: 52
DRG: 710 | End: 2017-11-05
Payer: COMMERCIAL

## 2017-11-05 PROBLEM — R78.81 BACTEREMIA: Status: ACTIVE | Noted: 2017-11-05

## 2017-11-05 LAB
GLUCOSE SERPL-MCNC: 105 MG/DL (ref 65–140)
GLUCOSE SERPL-MCNC: 141 MG/DL (ref 65–140)
GLUCOSE SERPL-MCNC: 194 MG/DL (ref 65–140)
GLUCOSE SERPL-MCNC: 221 MG/DL (ref 65–140)

## 2017-11-05 PROCEDURE — 93306 TTE W/DOPPLER COMPLETE: CPT

## 2017-11-05 PROCEDURE — 82948 REAGENT STRIP/BLOOD GLUCOSE: CPT

## 2017-11-05 RX ADMIN — ACETAMINOPHEN 650 MG: 325 TABLET ORAL at 00:28

## 2017-11-05 RX ADMIN — ACETAMINOPHEN 650 MG: 325 TABLET ORAL at 05:08

## 2017-11-05 RX ADMIN — AMPICILLIN SODIUM AND SULBACTAM SODIUM 3 G: 2; 1 INJECTION, POWDER, FOR SOLUTION INTRAMUSCULAR; INTRAVENOUS at 03:41

## 2017-11-05 RX ADMIN — AMPICILLIN SODIUM AND SULBACTAM SODIUM 3 G: 2; 1 INJECTION, POWDER, FOR SOLUTION INTRAMUSCULAR; INTRAVENOUS at 16:42

## 2017-11-05 RX ADMIN — ACETAMINOPHEN 650 MG: 325 TABLET ORAL at 23:59

## 2017-11-05 RX ADMIN — AMPICILLIN SODIUM AND SULBACTAM SODIUM 3 G: 2; 1 INJECTION, POWDER, FOR SOLUTION INTRAMUSCULAR; INTRAVENOUS at 11:00

## 2017-11-05 RX ADMIN — INSULIN LISPRO 1 UNITS: 100 INJECTION, SOLUTION INTRAVENOUS; SUBCUTANEOUS at 21:09

## 2017-11-05 RX ADMIN — ACETAMINOPHEN 650 MG: 325 TABLET ORAL at 13:04

## 2017-11-05 RX ADMIN — LISINOPRIL 10 MG: 10 TABLET ORAL at 09:00

## 2017-11-05 RX ADMIN — SULFAMETHOXAZOLE AND TRIMETHOPRIM 1 TABLET: 800; 160 TABLET ORAL at 21:09

## 2017-11-05 RX ADMIN — ALPRAZOLAM 0.5 MG: 0.5 TABLET ORAL at 13:04

## 2017-11-05 RX ADMIN — MELATONIN 6 MG: 3 TAB ORAL at 21:09

## 2017-11-05 RX ADMIN — ENOXAPARIN SODIUM 40 MG: 40 INJECTION SUBCUTANEOUS at 09:00

## 2017-11-05 RX ADMIN — INSULIN GLARGINE 40 UNITS: 100 INJECTION, SOLUTION SUBCUTANEOUS at 21:09

## 2017-11-05 RX ADMIN — ACETAMINOPHEN 650 MG: 325 TABLET ORAL at 18:00

## 2017-11-05 RX ADMIN — INSULIN LISPRO 2 UNITS: 100 INJECTION, SOLUTION INTRAVENOUS; SUBCUTANEOUS at 16:41

## 2017-11-05 RX ADMIN — AMPICILLIN SODIUM AND SULBACTAM SODIUM 3 G: 2; 1 INJECTION, POWDER, FOR SOLUTION INTRAMUSCULAR; INTRAVENOUS at 23:58

## 2017-11-05 RX ADMIN — SULFAMETHOXAZOLE AND TRIMETHOPRIM 1 TABLET: 800; 160 TABLET ORAL at 09:00

## 2017-11-05 NOTE — ASSESSMENT & PLAN NOTE
· XR left foot on admission: Extensive subcutaneous air noted at the 1st toe extending to the 1st metatarsal region along with soft tissue swelling  Correlate for gas gangrene  Comminuted fracture at the 1st distal phalanx proximally  Fracture margins are ill-defined, underlying osteomyelitis is not excluded      · Podiatry following  · POD #6 s/p guillotine left hallux amputation with irrigation and drainage  · POD #2 s/p left 1st partial ray amputation  · Encourage IS   · ID following  · Initial blood cultures: 1/2 positive for peptostreptococcus prevotii  · Wound cultures: Alcaligenes species, Beta Hemolytic Streptococcus Group G and anaerobic GNR  · Antibiotics changed to IV Unasyn and PO Bactrim  Needs a total of 14 days of IV antibiotics per ID  · Repeat blood cultures x 2: no growth at 48 hours  Ok to obtain PICC line if repeat blood cultures remain negative at 72 hours per discussion with ID  · Discussed with attending physician, PICC line consent to be obtained

## 2017-11-05 NOTE — ASSESSMENT & PLAN NOTE
· Blood cultures 1/2 positive for peptostreptococcus  · ID following  Continue IV Unasyn and p o  Bactrim; needs a total of 14 days of IV antibiotics  · Repeat blood cultures are negative at 48 hours  Continue to follow results

## 2017-11-05 NOTE — ASSESSMENT & PLAN NOTE
· Last A1c 10 3%  Patient was on metformin prior to admission  · Blood sugars 105-183 over the past 24 hours  · Continue current insulin regimen with Lantus 40 units hs and Humalog 15 units TID with meals  · SSI for additional coverage  · Continue to monitor accu-checks ac and hs  · Outpatient follow-up with endocrinology in 2 weeks after d/c

## 2017-11-05 NOTE — ASSESSMENT & PLAN NOTE
· Blood cultures 1/2 positive for peptostreptococcus  · ID following  Continue IV Unasyn and p o  Bactrim; needs a total of 14 days of IV antibiotics  · Echo unremarkable for endocarditis

## 2017-11-05 NOTE — ASSESSMENT & PLAN NOTE
· Last A1c 10 3%  Patient was on metformin prior to admission  · Continue current insulin regimen with Lantus 40 units hs and Humalog 15 units TID with meals  · SSI for additional coverage  · Continue to monitor accu-checks ac and hs  · Outpatient follow-up with endocrinology in 2 weeks after d/c

## 2017-11-05 NOTE — ASSESSMENT & PLAN NOTE
· Severe sepsis, POA, as evidenced by leukocytosis, tachycardia and elevated lactic acid  · Clinically improved  · Continue IV Unasyn, will need a total of 14 days of IV antibiotics per ID  Continue PO Bactrim

## 2017-11-05 NOTE — ASSESSMENT & PLAN NOTE
· Podiatry following  · POD #7 s/p guillotine left hallux amputation with irrigation and drainage  · POD #3 s/p left 1st partial ray amputation  · Continue wound care  Stable per Podiatry for discharge  · ID following  · Initial blood cultures: 1/2 positive for peptostreptococcus prevotii  · Wound cultures: Alcaligenes species, Beta Hemolytic Streptococcus Group G and anaerobic GNR  · Antibiotics changed to IV Unasyn and PO Bactrim  Needs a total of 14 days of IV antibiotics per ID  · Repeat blood cultures x 2: no growth at 72 hours     · PICC line today

## 2017-11-05 NOTE — PROGRESS NOTES
Progress Note - Serge Phillips 45 y o  male MRN: [id number]    Unit/Bed#: -01 Encounter: [id number]        * Diabetic ulcer of left foot associated with diabetes mellitus due to underlying condition, with necrosis of muscle (HCC)   Assessment & Plan    · XR left foot on admission: Extensive subcutaneous air noted at the 1st toe extending to the 1st metatarsal region along with soft tissue swelling  Correlate for gas gangrene  Comminuted fracture at the 1st distal phalanx proximally  Fracture margins are ill-defined, underlying osteomyelitis is not excluded      · Podiatry following  · POD #6 s/p guillotine left hallux amputation with irrigation and drainage  · POD #2 s/p left 1st partial ray amputation  · Encourage IS   · ID following  · Initial blood cultures: 1/2 positive for peptostreptococcus prevotii  · Wound cultures: Alcaligenes species, Beta Hemolytic Streptococcus Group G and anaerobic GNR  · Antibiotics changed to IV Unasyn and PO Bactrim  Needs a total of 14 days of IV antibiotics per ID  · Repeat blood cultures x 2: no growth at 72 hours  Discussed with ID, PICC can be placed once cultures are negative at 72 hours  · Discussed with attending physician, PICC line consent to be obtained  IR consulted for PICC placement  Bacteremia   Assessment & Plan    · Blood cultures 1/2 positive for peptostreptococcus  · ID following  Continue IV Unasyn and p o  Bactrim; needs a total of 14 days of IV antibiotics  · Repeat blood cultures are negative at 48 hours  Continue to follow results  · Echo pending to r/o endocarditis  Sepsis ([facility] [address] )   Assessment & Plan    · Severe sepsis, POA, as evidenced by leukocytosis, tachycardia and elevated lactic acid  · Tachycardia resolved  WBC increased to 12 07 post-operatively  Patient afebrile  · Elevated lactic acid resolved, 0 9   · ID following, appreciate input    · Initial blood cultures: 1/2 showing peptostreptococcus, likely bacteremia  · Repeat blood cultures x 2: no growth at 72 hours  · Continue IV Unasyn, will need a total of 14 days of IV antibiotics per ID  Continue PO Bactrim  Uncontrolled type 2 diabetes mellitus with foot ulcer, without long-term current use of insulin ([facility])   Assessment & Plan    · Last A1c 10 3%  Patient was on metformin prior to admission  · Blood sugars 105-183 over the past 24 hours  · Continue current insulin regimen with Lantus 40 units hs and Humalog 15 units TID with meals  · SSI for additional coverage  · Continue to monitor accu-checks ac and hs  · Outpatient follow-up with endocrinology in 2 weeks after d/c  Essential hypertension   Assessment & Plan    · Continue to monitor BP  · Continue lisinopril 10 mg  VTE Pharmacologic Prophylaxis:   Pharmacologic: Enoxaparin (Lovenox)  Mechanical VTE Prophylaxis in Place: Yes    Patient Centered Rounds: I have performed bedside rounds with nursing staff today  Discussions with Specialists or Other Care Team Provider: Nursing, Infectious disease  Education and Discussions with Family / Patient: Patient    Time Spent for Care: 20 minutes  More than 50% of total time spent on counseling and coordination of care as described above  Current Length of Stay: 6 day(s)    Current Patient Status: Inpatient   Certification Statement: The patient will continue to require additional inpatient hospital stay due to IV antibiotics, need for PICC line    Discharge Plan: Likely d/c in 24-48 hours  Code Status: Level 1 - Full Code      Subjective:   Patient without complaints at this time  He denies pain, SOB or nausea/ vomiting, diarrhea  Appetite good  Objective:     Vitals:   Temp (24hrs), Av 4 °F (36 9 °C), Min:98 2 °F (36 8 °C), Max:98 8 °F (37 1 °C)    HR:  [87-94] 90  Resp:  [18] 18  BP: (126-165)/(62-84) 165/62  SpO2:  [91 %-95 %] 95 %  Body mass index is 29 5 kg/m²       Input and Output Summary (last  hours): Intake/Output Summary (Last 24 hours) at 11/05/17 1453  Last data filed at 11/05/17 1201   Gross per 24 hour   Intake              420 ml   Output                0 ml   Net              420 ml       Physical Exam:     Physical Exam   Constitutional: He is oriented to person, place, and time  No distress  HENT:   Head: Normocephalic and atraumatic  Eyes: Conjunctivae are normal    Neck: Neck supple  Cardiovascular: Normal rate and regular rhythm  Pulmonary/Chest: Effort normal and breath sounds normal  No respiratory distress  Abdominal: Soft  Bowel sounds are normal  There is no tenderness  Musculoskeletal: He exhibits edema (left lower extremity)  Neurological: He is alert and oriented to person, place, and time  Skin: Skin is warm and dry  He is not diaphoretic  No erythema  Left foot with dry dressing in place  Psychiatric: He has a normal mood and affect  Nursing note and vitals reviewed  Additional Data:     Labs:      Results from last 7 days  Lab Units 11/04/17  0521  11/02/17  0550   WBC Thousand/uL 12 07*  < > 10 28*   HEMOGLOBIN g/dL 11 0*  < > 11 1*   HEMATOCRIT % 33 8*  < > 33 8*   PLATELETS Thousands/uL 527*  < > 529*   NEUTROS PCT %  --   --  68   LYMPHS PCT %  --   --  21   MONOS PCT %  --   --  6   EOS PCT %  --   --  4   < > = values in this interval not displayed  Results from last 7 days  Lab Units 11/04/17  0521   SODIUM mmol/L 138   POTASSIUM mmol/L 3 6   CHLORIDE mmol/L 103   CO2 mmol/L 27   BUN mg/dL 11   CREATININE mg/dL 0 90   CALCIUM mg/dL 8 4   GLUCOSE RANDOM mg/dL 151*           * I Have Reviewed All Lab Data Listed Above  * Additional Pertinent Lab Tests Reviewed:  All Labs Within Last 24 Hours Reviewed    Imaging:    Imaging Reports Reviewed Today Include: None  Imaging Personally Reviewed by Myself Includes:  None    Recent Cultures (last 7 days):       Results from last 7 days  Lab Units 11/02/17  1057 10/30/17  2009 10/30/17  4542 10/30/17  1215   BLOOD CULTURE  No Growth at 72 hrs  No Growth at 72 hrs   --   --  No Growth After 5 Days  Peptostreptococcus prevotii*   GRAM STAIN RESULT   --  No polys seen  4+ Gram negative rods  4+ Gram positive cocci in pairs, chains and clusters  1+ Gram positive rods 1+ Polys  4+ Gram negative rods  4+ Gram positive cocci in pairs, chains and clusters  3+ Gram positive rods Gram positive cocci in clusters   WOUND CULTURE   --  Few Colonies of Beta Hemolytic Streptococcus Group G*  4+ Growth of  4+ Growth of Alcaligenes species*  4+ Growth of   --        Last 24 Hours Medication List:     acetaminophen 650 mg Oral Q6H [address]   ampicillin-sulbactam 3 g Intravenous Q6H   enoxaparin 40 mg Subcutaneous Daily   insulin glargine 40 Units Subcutaneous HS   insulin lispro 1-5 Units Subcutaneous HS   insulin lispro 1-6 Units Subcutaneous TID AC   insulin lispro 15 Units Subcutaneous TID With Meals   lisinopril 10 mg Oral Daily   melatonin 6 mg Oral HS   nicotine 1 patch Transdermal Daily   nystatin-triamcinolone  Topical BID   sulfamethoxazole-trimethoprim 1 tablet Oral Q12H [address]        Today, Patient Was Seen By: Terry Workman PA-C    ** Please Note: Dictation voice to text software may have been used in the creation of this document   **

## 2017-11-06 PROBLEM — Q21.12 PFO (PATENT FORAMEN OVALE): Status: ACTIVE | Noted: 2017-11-06

## 2017-11-06 PROBLEM — Q21.1 PFO (PATENT FORAMEN OVALE): Status: ACTIVE | Noted: 2017-11-06

## 2017-11-06 LAB
GLUCOSE SERPL-MCNC: 175 MG/DL (ref 65–140)
GLUCOSE SERPL-MCNC: 185 MG/DL (ref 65–140)
GLUCOSE SERPL-MCNC: 249 MG/DL (ref 65–140)

## 2017-11-06 PROCEDURE — 82948 REAGENT STRIP/BLOOD GLUCOSE: CPT

## 2017-11-06 PROCEDURE — 97535 SELF CARE MNGMENT TRAINING: CPT

## 2017-11-06 RX ADMIN — SULFAMETHOXAZOLE AND TRIMETHOPRIM 1 TABLET: 800; 160 TABLET ORAL at 21:50

## 2017-11-06 RX ADMIN — NYSTATIN AND TRIAMCINOLONE ACETONIDE: 100000; 1 CREAM TOPICAL at 08:42

## 2017-11-06 RX ADMIN — AMPICILLIN SODIUM AND SULBACTAM SODIUM 3 G: 2; 1 INJECTION, POWDER, FOR SOLUTION INTRAMUSCULAR; INTRAVENOUS at 05:59

## 2017-11-06 RX ADMIN — ACETAMINOPHEN 650 MG: 325 TABLET ORAL at 12:02

## 2017-11-06 RX ADMIN — INSULIN LISPRO 1 UNITS: 100 INJECTION, SOLUTION INTRAVENOUS; SUBCUTANEOUS at 12:02

## 2017-11-06 RX ADMIN — LISINOPRIL 10 MG: 10 TABLET ORAL at 08:41

## 2017-11-06 RX ADMIN — INSULIN LISPRO 2 UNITS: 100 INJECTION, SOLUTION INTRAVENOUS; SUBCUTANEOUS at 21:51

## 2017-11-06 RX ADMIN — INSULIN GLARGINE 40 UNITS: 100 INJECTION, SOLUTION SUBCUTANEOUS at 21:50

## 2017-11-06 RX ADMIN — NYSTATIN AND TRIAMCINOLONE ACETONIDE: 100000; 1 CREAM TOPICAL at 17:27

## 2017-11-06 RX ADMIN — SULFAMETHOXAZOLE AND TRIMETHOPRIM 1 TABLET: 800; 160 TABLET ORAL at 08:42

## 2017-11-06 RX ADMIN — ALPRAZOLAM 0.5 MG: 0.5 TABLET ORAL at 08:41

## 2017-11-06 RX ADMIN — MELATONIN 6 MG: 3 TAB ORAL at 21:50

## 2017-11-06 RX ADMIN — ACETAMINOPHEN 650 MG: 325 TABLET ORAL at 17:27

## 2017-11-06 RX ADMIN — AMPICILLIN SODIUM AND SULBACTAM SODIUM 3 G: 2; 1 INJECTION, POWDER, FOR SOLUTION INTRAMUSCULAR; INTRAVENOUS at 17:36

## 2017-11-06 RX ADMIN — AMPICILLIN SODIUM AND SULBACTAM SODIUM 3 G: 2; 1 INJECTION, POWDER, FOR SOLUTION INTRAMUSCULAR; INTRAVENOUS at 12:28

## 2017-11-06 RX ADMIN — ACETAMINOPHEN 650 MG: 325 TABLET ORAL at 05:59

## 2017-11-06 RX ADMIN — INSULIN LISPRO 1 UNITS: 100 INJECTION, SOLUTION INTRAVENOUS; SUBCUTANEOUS at 17:29

## 2017-11-06 NOTE — PROGRESS NOTES
Progress Note - Podiatry  [facility] 45 [facility]  [facility] MRN: [id number]  Unit/Bed#: -[place] Encounter: [id number]    Assessment  1  Left foot gas gangrene with severe sepsis POD #7 open hallux amputation, POD #3 delayed closure  2  Left hallux osteomyelitis  3  Left foot cellulitis - improving  4  DM neuropathy  5  Peptostreptococcus bacteremia    Plan:  1  Stable postop appearance of left foot 1st ray amputation  See pictures in objective portion below  There is no sign of remaining infection in the foot  Patient's repeat blood cultures are no growth at 72 hours  The foot was dressed today and is stable from discharge from my point of view  He is likely getting a PICC line today to continue IV antibiotics per Infectious Disease orders  I will write wound care and follow-up orders in the patient's discharge notes  2   Nonweightbearing to left foot  Subjective/Objective   Chief Complaint:   Chief Complaint   Patient presents with    Toe Injury - Major     c/o left great toe and toenail black with odorous drainage noted  Also left foot and ankle swelling and increased blood sugars x 1 week  Subjective: 45 y o  y/o male seen and evaluated at bedside  No acute events overnight  Denies N/F/V/SOB/CP/cough/diarrhea/constipation  Blood pressure 141/83, pulse 90, temperature 98 2 °F (36 8 °C), temperature source Oral, resp  rate 20, height 6' 3" (1 905 m), weight 107 kg (236 lb), SpO2 90 %  ,Body mass index is 29 5 kg/m²      Lab Results   Component Value Date    WBC 12 07 (H) 11/04/2017    HGB 11 0 (L) 11/04/2017    HCT 33 8 (L) 11/04/2017    MCV 88 11/04/2017     (H) 11/04/2017     Lab Results   Component Value Date    GLUCOSE 151 (H) 11/04/2017    CALCIUM 8 4 11/04/2017     11/04/2017    K 3 6 11/04/2017    CO2 27 11/04/2017     11/04/2017    BUN 11 11/04/2017    CREATININE 0 90 11/04/2017         Invasive Devices     Peripheral Intravenous Line            Peripheral IV 11/05/17 Right Forearm less than 1 day            Microbiology Results (last 21 days)     Procedure Component Value - Date/Time   Blood culture [[id number]] Collected: 11/02/17 1057   Lab Status: Preliminary result Specimen: Blood from Arm, Right Updated: 11/05/17 1401    Blood Culture No Growth at 72 hrs  Blood culture [[id number]] Collected: 11/02/17 1057   Lab Status: Preliminary result Specimen: Blood from Arm, Left Updated: 11/05/17 1401    Blood Culture No Growth at 72 hrs  Anaerobic culture and Gram stain [[id number]] (Abnormal)  Collected: 10/30/17 2009   Lab Status: Final result Specimen: Wound from Foot, Left Updated: 11/02/17 1200    Anaerobic Culture 4+ Growth of Anaerobic Gram Negative Bacilli (A)    Comment: Mixed Aerobic and Anaerobic Hilary      Narrative:     ANAEROBIC GRAM NEGATIVE BACILLI-NOT B FRAG  GROUP   Susceptibility      Anaerobic Gram Negative Bacilli     Not Specified    Beta Lactamase Positive                 Wound culture and Gram stain [[id number]] (Abnormal) Collected: 10/30/17 2009   Lab Status: Final result Specimen: Wound from Foot, Left Updated: 11/02/17 1042    Wound Culture Few Colonies of Beta Hemolytic Streptococcus Group G (A)    Comment: This organism is intrinisically susceptible to Penicillin  If sensitivites to other antibiotics are required, please call the Microbiology Department at [phone] within 5 days          4+ Growth of     Comment: Mixed Skin Hilary       Gram Stain Result No polys seen     4+ Gram negative rods     4+ Gram positive cocci in pairs, chains and clusters     1+ Gram positive rods   Wound culture and Gram stain [[id number]] (Abnormal)  Collected: 10/30/17 1755   Lab Status: Final result Specimen: Wound from Foot, Left Updated: 11/03/17 1001    Wound Culture 4+ Growth of Alcaligenes species (A)     4+ Growth of     Comment: Mixed Skin Hilary       Gram Stain Result 1+ Polys     4+ Gram negative rods     4+ Gram positive cocci in pairs, chains and clusters     3+ Gram positive rods   Susceptibility      Alcaligenes species     WILFREDO    Aztreonam ($$$)  16 ug/ml Intermediate    Cefepime ($) <=8 00 ug/ml"><=8 00 ug/ml Susceptible    Cefotaxime ($) <=2 00 ug/ml"><=2 00 ug/ml Susceptible    Ceftazidime ($$) 4 ug/ml Susceptible    Ceftriaxone ($$) <=8 00 ug/ml"><=8 00 ug/ml Susceptible    Ciprofloxacin ($)  <=1 00 ug/ml"><=1 00 ug/ml Susceptible    Gentamicin ($$) <=4 ug/ml"><=4 ug/ml Susceptible    Imipenem <=4 ug/ml"><=4 ug/ml Susceptible    Levofloxacin ($) <=2 00 ug/ml"><=2 00 ug/ml Susceptible    Meropenem ($$) <=4 00 ug/ml"><=4 00 ug/ml Susceptible    Piperacillin + Tazobactam ($$$) <=16 ug/ml"><=16 ug/ml Susceptible    Tetracycline <=4 ug/ml"><=4 ug/ml Susceptible    Ticarcillin/K Clavulanate <=16 ug/ml"><=16 ug/ml Susceptible    Tobramycin ($) <=4 ug/ml"><=4 ug/ml Susceptible    Trimethoprim + Sulfamethoxazole ($$$) <=2/38 ug/ml"><=2/38 ug/ml Susceptible                Blood culture #1 [[id number]] (Abnormal) Collected: 10/30/17 1215   Lab Status: Final result Specimen: Blood from Arm, Right Updated: 11/04/17 0933    Blood Culture --     Peptostreptococcus prevotii (A)    Comment: This organism has been edited  The previous result was Anaerobic cocci on 11/3/2017 at 1101 EDT  Gram Stain Result Gram positive cocci in clusters   Blood culture #2 [[id number]] Collected: 10/30/17 1215   Lab Status: Final result Specimen: Blood from Arm, Right Updated: 11/04/17 1701    Blood Culture No Growth After 5 Days  Physical Exam:   General: alert, cooperative and no distress  Lungs: Normal respiratory effort, LCTABL  Heart: regular rate and rhythm   Abdomen: soft, non-tender  Positive bowel sounds  Extremity:  Stable postop appearance left foot 1st ray amputation  Minimal drainage on dressing  No sign of purulence, cellulitis, or any sign of infection  There is a small area at the distal 3rd of bruise tissue however overall the incision is well coapted with intact sutures  No pain with palpation  No calf pain with compression  Cap refill time to remaining digits is normal                 Lab, Imaging and other studies:     Imaging: I have personally reviewed pertinent films in PACS  EKG, Pathology, and Other Studies: I have personally reviewed pertinent reports  Portions of the record may have been created with voice recognition software  Occasional wrong word or "sound a like" substitutions may have occurred due to the inherent limitations of voice recognition software  Read the chart carefully and recognize, using context, where substitutions have occurred

## 2017-11-06 NOTE — ASSESSMENT & PLAN NOTE
· Incidental finding on echo  · Discussed with cardiology, in setting of clinically stable picture, no further workup required

## 2017-11-06 NOTE — PHYSICAL THERAPY NOTE
PHYSICAL THERAPY NOTE  Patient Name: Daren Cortés  AHBLG'U Date: 11/6/2017 11/06/17 1435   Pain Assessment   Pain Assessment 0-10   Pain Score No Pain   Restrictions/Precautions   Weight Bearing Precautions Per Order Yes   LLE Weight Bearing Per Order NWB   Other Precautions WBS; Fall Risk   General   Chart Reviewed Yes   Response to Previous Treatment Patient with no complaints from previous session  Family/Caregiver Present No   Cognition   Overall Cognitive Status WFL   Orientation Level Oriented X4   Memory Within functional limits   Following Commands Follows one step commands with increased time or repetition   Subjective   Subjective Pt reports TDC tomorrow, having PICC line placed today    Transfers   Sit to Stand 6  Modified independent   Additional items Assist x 1   Stand to Sit 6  Modified independent   Additional items Assist x 1   Additional Comments Pt fitted for adult tall crutches for potential use on stairs and for stairs with railing and A only due to pt's fall risk with neuropathy, gait deviations, fear of falling  However pt is scheduled for PICC line later today  Pt instructed of precaution/contraindications of using crutch on PICC line side, and pt/PT problem solving on how to use crutches with one rail, which may or may not work depending on side of PICC line placement  Pt has options of either (1)  using crutch in non picc arm with railing and railing on PICC side, (2) ascending up via bump up on buttocks, or (3) having friends carry him into the house  I Instructed pt on pros/cons of each     Balance   Static Sitting Good   Static Standing Fair  (5 min with UE support and maintaining NWB on LLE)   Endurance Deficit   Endurance Deficit No   Activity Tolerance   Activity Tolerance Patient limited by fatigue;Patient limited by pain   Nurse Made Aware spoke to [facility]   Exercises   THR (Pt verbalized good understanding of all ther ex)   Assessment   Prognosis Fair   Problem List Decreased strength;Decreased range of motion;Decreased endurance; Impaired balance;Decreased mobility; Impaired sensation;Obesity; Decreased skin integrity;Orthopedic restrictions; Impaired vision   Assessment Pt verbalized good understanding of instruction provided verbally and via demonstration this session, including precautions/contraindications with crutches with stair performance  Pt fitted for crutches for stair use only IF PICC line is not placed on that UE  Recommend pt use rolling walker for level surfaces inside of home, and pt continue with HHPT only DC   Barriers to Discharge (HUSEYIN)   Goals   Patient Goals to go home and get into home   STG Expiration Date 11/17/17   Treatment Day 3   Plan   Treatment/Interventions Functional transfer training;LE strengthening/ROM; Elevations; Therapeutic exercise; Endurance training;Cognitive reorientation;Patient/family training;Equipment eval/education; Bed mobility;Gait training;Spoke to nursing   Progress Progressing toward goals   PT Frequency 5x/wk   Recommendation   Recommendation Home with family support;Home PT  (and assistance to enter)   Equipment Recommended (for level surfaces, possibly crutches for stairs to enter))   Skilled PT recommended while in hospital and upon DC to progress pt toward treatment goals     Linn Rhodes, PT

## 2017-11-06 NOTE — PROGRESS NOTES
Progress Note - Trace Manning 45 y o  male MRN: [id number]    Unit/Bed#: -01 Encounter: [id number]        PFO (patent foramen ovale)   Assessment & Plan    · Incidental finding on echo  · Discussed with cardiology, in setting of clinically stable picture, no further workup required  Bacteremia   Assessment & Plan    · Blood cultures 1/2 positive for peptostreptococcus  · ID following  Continue IV Unasyn and p o  Bactrim; needs a total of 14 days of IV antibiotics  · Echo unremarkable for endocarditis  Sepsis ([facility] )   Assessment & Plan    · Severe sepsis, POA, as evidenced by leukocytosis, tachycardia and elevated lactic acid  · Clinically improved  · Continue IV Unasyn, will need a total of 14 days of IV antibiotics per ID  Continue PO Bactrim  Uncontrolled type 2 diabetes mellitus with foot ulcer, without long-term current use of insulin ([facility])   Assessment & Plan    · Last A1c 10 3%  Patient was on metformin prior to admission  · Continue current insulin regimen with Lantus 40 units hs and Humalog 15 units TID with meals  · SSI for additional coverage  · Continue to monitor accu-checks ac and hs  · Outpatient follow-up with endocrinology in 2 weeks after d/c  Essential hypertension   Assessment & Plan    · Continue to monitor BP  · Continue lisinopril 10 mg         * Diabetic ulcer of left foot associated with diabetes mellitus due to underlying condition, with necrosis of muscle ([facility] )   Assessment & Plan    · Podiatry following  · POD #7 s/p guillotine left hallux amputation with irrigation and drainage  · POD #3 s/p left 1st partial ray amputation  · Continue wound care  Stable per Podiatry for discharge  · ID following  · Initial blood cultures: 1/2 positive for peptostreptococcus prevotii  · Wound cultures: Alcaligenes species, Beta Hemolytic Streptococcus Group G and anaerobic GNR  · Antibiotics changed to IV Unasyn and PO Bactrim   Needs a total of 14 days of IV antibiotics per ID  · Repeat blood cultures x 2: no growth at 72 hours  · PICC line today            Pharmacologic: Enoxaparin (Lovenox)  Mechanical VTE Prophylaxis in Place: Yes    Patient Centered Rounds: I have performed bedside rounds with nursing staff today  Discussions with Specialists or Other Care Team Provider: case management    Education and Discussions with Family / Patient: patient    Time Spent for Care: 30 minutes  More than 50% of total time spent on counseling and coordination of care as described above  Current Length of Stay: 7 day(s)    Current Patient Status: Inpatient   Certification Statement: The patient will continue to require additional inpatient hospital stay due to Discharge planning    Discharge Plan / Estimated Discharge Date:       Code Status: Level 1 - Full Code      Subjective:   Offers no complaints    Objective:     Vitals:   Temp (24hrs), Av 2 °F (36 8 °C), Min:98 °F (36 7 °C), Max:98 5 °F (36 9 °C)    HR:  [] 85  Resp:  [18-20] 18  BP: (137-148)/(78-93) 148/93  SpO2:  [90 %-91 %] 91 %  Body mass index is 29 5 kg/m²  Input and Output Summary (last 24 hours): Intake/Output Summary (Last 24 hours) at 17 0917  Last data filed at 17 0900   Gross per 24 hour   Intake             1920 ml   Output                0 ml   Net             1920 ml       Physical Exam:     Physical Exam   Constitutional: He is oriented to person, place, and time  He appears well-developed  No distress  HENT:   Head: Normocephalic and atraumatic  Neck: Normal range of motion  Neck supple  Cardiovascular: Normal rate and regular rhythm  No murmur heard  Pulmonary/Chest: Effort normal and breath sounds normal  No respiratory distress  He has no wheezes  He has no rales  Abdominal: Soft  Bowel sounds are normal  He exhibits no distension  Musculoskeletal: He exhibits no edema     Neurological: He is alert and oriented to person, place, and time  No cranial nerve deficit  Skin: Skin is warm and dry  No rash noted  Left foot dressing intact  Psychiatric: He has a normal mood and affect  Additional Data:     Labs:      Results from last 7 days  Lab Units 11/04/17  0521  11/02/17  0550   WBC Thousand/uL 12 07*  < > 10 28*   HEMOGLOBIN g/dL 11 0*  < > 11 1*   HEMATOCRIT % 33 8*  < > 33 8*   PLATELETS Thousands/uL 527*  < > 529*   NEUTROS PCT %  --   --  68   LYMPHS PCT %  --   --  21   MONOS PCT %  --   --  6   EOS PCT %  --   --  4   < > = values in this interval not displayed  Results from last 7 days  Lab Units 11/04/17  0521   SODIUM mmol/L 138   POTASSIUM mmol/L 3 6   CHLORIDE mmol/L 103   CO2 mmol/L 27   BUN mg/dL 11   CREATININE mg/dL 0 90   CALCIUM mg/dL 8 4   GLUCOSE RANDOM mg/dL 151*             Recent Cultures (last 7 days):       Results from last 7 days  Lab Units 11/02/17  1057 10/30/17  2009 10/30/17  1755 10/30/17  1215   BLOOD CULTURE  No Growth at 72 hrs  No Growth at 72 hrs   --   --  No Growth After 5 Days    Peptostreptococcus prevotii*   GRAM STAIN RESULT   --  No polys seen  4+ Gram negative rods  4+ Gram positive cocci in pairs, chains and clusters  1+ Gram positive rods 1+ Polys  4+ Gram negative rods  4+ Gram positive cocci in pairs, chains and clusters  3+ Gram positive rods Gram positive cocci in clusters   WOUND CULTURE   --  Few Colonies of Beta Hemolytic Streptococcus Group G*  4+ Growth of  4+ Growth of Alcaligenes species*  4+ Growth of   --        Last 24 Hours Medication List:     acetaminophen 650 mg Oral Q6H [address]   ampicillin-sulbactam 3 g Intravenous Q6H   enoxaparin 40 mg Subcutaneous Daily   insulin glargine 40 Units Subcutaneous HS   insulin lispro 1-5 Units Subcutaneous HS   insulin lispro 1-6 Units Subcutaneous TID AC   insulin lispro 15 Units Subcutaneous TID With Meals   lisinopril 10 mg Oral Daily   melatonin 6 mg Oral HS   nicotine 1 patch Transdermal Daily   nystatin-triamcinolone Topical BID   sulfamethoxazole-trimethoprim 1 tablet Oral Q12H [address]        Today, Patient Was Seen By: Eddi Burkett PA-C    ** Please Note: Dragon 360 Dictation voice to text software may have been used in the creation of this document   **

## 2017-11-06 NOTE — PLAN OF CARE
Problem: PHYSICAL THERAPY ADULT  Goal: Performs mobility at highest level of function for planned discharge setting  See evaluation for individualized goals  Treatment/Interventions: Functional transfer training, LE strengthening/ROM, Elevations, Therapeutic exercise, Endurance training, Cognitive reorientation, Patient/family training, Equipment eval/education, Bed mobility, Gait training, Spoke to nursing  Equipment Recommended: Maritzaie Basket (for level surfaces, possibly crutches for stairs to enter)       See flowsheet documentation for full assessment, interventions and recommendations  Outcome: Progressing  Prognosis: Fair  Problem List: Decreased strength, Decreased range of motion, Decreased endurance, Impaired balance, Decreased mobility, Impaired sensation, Obesity, Decreased skin integrity, Orthopedic restrictions, Impaired vision  Assessment: Pt verbalized good understanding of instruction provided verbally and via demonstration this session, including precautions/contraindications with crutches with stair performance  Pt fitted for crutches for stair use only IF PICC line is not placed on that UE  Recommend pt use rolling walker for level surfaces inside of home, and pt continue with HHPT only DC  Barriers to Discharge:  (HUSEYIN)  Barriers to Discharge Comments: did steps 1 rail 1 crutch 3 twice A 1  to have someone build second rail possible prior to d/c   Recommendation: Home with family support, Home PT (and assistance to enter)     PT - OK to Discharge: Yes    See flowsheet documentation for full assessment

## 2017-11-06 NOTE — DISCHARGE INSTRUCTIONS
VNA left foot care:  Change dressing M,W,F to left foot  Clean incision gently with NSS and dry well  Cover with 4x4 gauze, yoko, and ACE bandage  Please ensure ACE bandage is applied to reduce any deep hematoma formation risk  Encourage elevation and rest    Discontinue PICC line after antibiotics complete on 11/12/17  Diabetes in the Older Adult   WHAT YOU NEED TO KNOW:   Older adults with diabetes are at risk for heart disease, stroke, kidney disease, blindness, and nerve damage  You may also be at risk for any of the following:  · Poor nutrition or low blood sugar levels    · Confusion or problems with memory, attention, or learning new things    · Trouble controlling urination or frequent urinary tract infections    · Trouble with coordination or balance    · Falls and injuries    · Pain    · Depression    · Open sores on your legs or feet  DISCHARGE INSTRUCTIONS:   Call 911 for any of the following:   · You have any of the following signs of a stroke:      ¨ Numbness or drooping on one side of your face     ¨ Weakness in an arm or leg    ¨ Confusion or difficulty speaking    ¨ Dizziness, a severe headache, or vision loss    · You have any of the following signs of a heart attack:      ¨ Squeezing, pressure, or pain in your chest that lasts longer than 5 minutes or returns    ¨ Discomfort or pain in your back, neck, jaw, stomach, or arm     ¨ Trouble breathing    ¨ Nausea or vomiting    ¨ Lightheadedness or a sudden cold sweat, especially with chest pain or trouble breathing  Seek care immediately if:   · You have severe abdominal pain, or the pain spreads to your back  You may also be vomiting  · You have trouble staying awake or focusing  · You are shaking or sweating  · You have blurred or double vision  · Your breath has a fruity, sweet smell  · Your breathing is deep and labored, or rapid and shallow  · Your heartbeat is fast and weak  · You fall and get hurt    Contact your healthcare provider if:   · You are vomiting or have diarrhea  · You have an upset stomach and cannot eat the foods on your meal plan  · You feel weak or more tired than usual      · You feel dizzy, have headaches, or are easily irritated  · Your skin is red, warm, dry, or swollen  · You have a wound that does not heal      · You have numbness in your arms or legs  · You have trouble coping with your illness, or you feel anxious or depressed  · You have problems with your memory  · You have changes in your vision  · You have questions or concerns about your condition or care  Medicines  may be given to decrease the amount of sugar in your blood  You may also need medicine to lower your blood pressure or cholesterol, or medicine to prevent blood clots  Manage the ABCs and prevent problems caused by diabetes:   · Check your blood sugar levels as directed  Your healthcare provider will tell you when and how often to check during the day  Your healthcare provider will also tell you what your blood sugar levels should be before and after a meal  You may need to check for ketones in your urine or blood if your level is higher than directed  Write down your results and show them to your healthcare provider  Your provider may use the results to make changes to your medicine, food, or exercise schedules  Ask your healthcare provider for more information about how to treat a high or low blood sugar level  · Follow your meal plan as directed  A dietitian will help you make a meal plan to keep your blood sugar level steady and make sure you get enough nutrition  Do not skip meals  Your blood sugar level may drop too low if you have taken diabetes medicine and do not eat  Ask your healthcare provider about programs in your community that can deliver the meals to your home  · Try to be active for 30 to 60 minutes most days of the week    Exercise can help keep your blood sugar level steady, decrease your risk of heart disease, and help you lose weight  It can also help improve your balance and decrease your risk for falls  Work with your healthcare provider to create an exercise plan  Ask a family member or friend to exercise with you  Start slow and exercise for 5 to 10 minutes at a time  Examples of activities include walking or swimming  Include muscle strengthening activities 2 to 3 days each week  Include balance training 2 to 3 times each week  Activities that help increase balance include yoga and lyla chi      · Maintain a healthy weight  Ask your healthcare provider how much you should weigh  A healthy weight can help you control your diabetes and prevent heart disease  Ask your provider to help you create a weight loss plan if you are overweight  Together you can set manageable weight loss goals  · Do not smoke  Ask your healthcare provider for information if you currently smoke and need help to quit  Do not use e-cigarettes or smokeless tobacco in place of cigarettes or to help you quit  They still contain nicotine  · Manage stress  Stress may increase your blood sugar level  Deep breathing, muscle relaxation, and music may help you relax  Ask your healthcare provider for more information about these practices  Other ways to manage your diabetes:   · Check your feet every day for sores  Look at your whole foot, including the bottom, and between and under your toes  Check for wounds, corns, and calluses  Use a mirror to see the bottom of your feet  The skin on your feet may be shiny, tight, dry, or darker than normal  Your feet may also be cold and pale  Feel your feet by running your hands along the tops, bottoms, sides, and between your toes  Redness, swelling, and warmth are signs of blood flow problems that can lead to a foot ulcer  Do not try to remove corns or calluses yourself  · Wear medical alert identification    Wear medical alert jewelry or carry a card that says you have diabetes  Ask your healthcare provider where to get these items  · Ask about vaccines  You have a higher risk for serious illness if you get the flu, pneumonia, or hepatitis  Ask your healthcare provider if you should get a flu, pneumonia, shingles, or hepatitis B vaccine, and when to get the vaccine  · Keep all appointments  You may need to return to have your A1c checked every 3 months  You will need to return at least once each year to have your feet checked  You will need an eye exam once a year to check for retinopathy  You will also need urine tests every year to check for kidney problems  You may need tests to monitor for heart disease  Write down your questions so you remember to ask them during your visits  · Get help from family and friends  You may need help checking your blood sugar level, giving insulin injections, or preparing your meals  Ask your family and friends to help you with these tasks  Talk to your healthcare provider if you do not have someone at home to help you  A healthcare provider can come to your home to help you with these tasks  Follow up with your healthcare provider as directed: You may need to return to have your A1c checked every 3 months  You will need to return at least once each year to have your feet checked  You will need an eye exam once a year to check for retinopathy  You will also need urine tests every year to check for kidney problems  You may need tests to monitor for heart disease  Write down your questions so you remember to ask them during your visits  © 2017 [employer] Information is for End User's use only and may not be sold, redistributed or otherwise used for commercial purposes  All illustrations and images included in CareNotes® are the copyrighted property of [employer]  or Jay Nelson  The above information is an  only   It is not intended as medical advice for individual conditions or treatments  Talk to your doctor, nurse or pharmacist before following any medical regimen to see if it is safe and effective for you

## 2017-11-06 NOTE — PROGRESS NOTES
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   Operative culture with GGS, Alcaligenes and anaerobic GNR   Patient is clinically much improved  He is status post I&D x2, with surgical cure  -IV Unasyn plan as in below      -continue p o  Bactrim x same duration  -local wound care per Podiatry   -serial exams  -monitor temperature/WBC     2   Severe sepsis-POA   Leukocytosis and tachycardia with elevated lactate   Appears all secondary to 1   No other clear source appreciated   Patient is clinically much improved   Sepsis has resolved   Fever has resolved   WBC improved     -antibiotics as above and below  -follow-up blood cultures  -supportive care  -no additional ID workup for now     3   Peptostreptococcus bacteremia   Source is most likely polymicrobic foot infection  Patient is clinically much improved  Bacteremia has cleared  2D echo without vegetation   -continue IV Unasyn x 14 days total, another 6 days  -follow-up repeat blood cultures  -patient will need surveillance blood cultures 2 weeks after completion of IV antibiotic      4  Diabetes mellitus-type 2 with hyperglycemia   Very poorly controlled diabetes at this time   -blood sugar control per the primary service     5   Hyponatremia-likely pseudohyponatremia secondary to the hyperglycemia   Improved with better glucose control   -monitor BMP     Discussed with patient in detail regarding the above plan  Discharge plan per primary service      Antibiotics:  Unasyn/Bactrim (Antibiotic # 8)  POD # 7     Subjective:  Patient is comfortable  He has minimal foot pain  Temperature stays down   No chills/sweats    He is tolerating antibiotics well   No nausea, vomiting or diarrhea       Objective:  Vitals:  HR:  [] 85  Resp:  [18-20] 18  BP: (137-148)/(78-93) 148/93  SpO2:  [90 %-91 %] 91 %  Temp (24hrs), Av 2 °F (36 8 °C), Min:98 °F (36 7 °C), Max:98 5 °F (36 9 °C)  Current: Temperature: 98 °F (36 7 °C)    Physical Exam:     General: Awake, alert, cooperative, no distress  Lungs: Expansion symmetric, no rales, no wheezing, respirations unlabored  Heart[de-identified]  Regular rate and rhythm, S1 and S2 normal, no murmur  Abdomen: Soft, nondistended, non-tender, bowel sounds active all four quadrants,        no masses, no organomegaly  Extremities: Left foot with dressing in place  Dressing is dry  Mild stable edema  No erythema  Minimal tenderness  Photo from Podiatry exam reviewed  Skin:  No rash  Invasive Devices     Peripheral Intravenous Line            Peripheral IV 17 Right Forearm less than 1 day                Labs studies:   I have personally reviewed pertinent labs  Results from last 7 days  Lab Units 17  0521 17  0451 17  0550   SODIUM mmol/L 138 141 138   POTASSIUM mmol/L 3 6 3 3* 3 4*   CHLORIDE mmol/L 103 105 105   CO2 mmol/L 27 29 26   ANION GAP mmol/L 8 7 7   BUN mg/dL 11 8 8   CREATININE mg/dL 0 90 0 97 0 83   EGFR ml/min/1 73sq m 96 88 100   GLUCOSE RANDOM mg/dL 151* 86 133   CALCIUM mg/dL 8 4 8 5 8 3       Results from last 7 days  Lab Units 17  0521 17  0451 17  0550   WBC Thousand/uL 12 07* 10 22* 10 28*   HEMOGLOBIN g/dL 11 0* 11 5* 11 1*   PLATELETS Thousands/uL 527* 574* 529*       Results from last 7 days  Lab Units 17  1057 10/30/17  2009 10/30/17  1755   BLOOD CULTURE  No Growth at 72 hrs    No Growth at 72 hrs   --   --    GRAM STAIN RESULT   --  No polys seen  4+ Gram negative rods  4+ Gram positive cocci in pairs, chains and clusters  1+ Gram positive rods 1+ Polys  4+ Gram negative rods  4+ Gram positive cocci in pairs, chains and clusters  3+ Gram positive rods   WOUND CULTURE   --  Few Colonies of Beta Hemolytic Streptococcus Group G*  4+ Growth of  4+ Growth of Alcaligenes species*  4+ Growth of        Imaging Studies:   I have personally reviewed pertinent imaging study reports and images in PACS  EKG, Pathology, and Other Studies:   I have personally reviewed pertinent reports

## 2017-11-07 ENCOUNTER — GENERIC CONVERSION - ENCOUNTER (OUTPATIENT)
Dept: OTHER | Facility: OTHER | Age: 52
End: 2017-11-07

## 2017-11-07 ENCOUNTER — APPOINTMENT (INPATIENT)
Dept: RADIOLOGY | Facility: HOSPITAL | Age: 52
DRG: 710 | End: 2017-11-07
Attending: INTERNAL MEDICINE
Payer: COMMERCIAL

## 2017-11-07 VITALS
DIASTOLIC BLOOD PRESSURE: 88 MMHG | OXYGEN SATURATION: 94 % | HEART RATE: 94 BPM | TEMPERATURE: 98.1 F | RESPIRATION RATE: 16 BRPM | SYSTOLIC BLOOD PRESSURE: 140 MMHG | BODY MASS INDEX: 29.34 KG/M2 | WEIGHT: 236 LBS | HEIGHT: 75 IN

## 2017-11-07 LAB
BACTERIA BLD CULT: NORMAL
BACTERIA BLD CULT: NORMAL
GLUCOSE SERPL-MCNC: 122 MG/DL (ref 65–140)
GLUCOSE SERPL-MCNC: 138 MG/DL (ref 65–140)
GLUCOSE SERPL-MCNC: 201 MG/DL (ref 65–140)

## 2017-11-07 PROCEDURE — 90686 IIV4 VACC NO PRSV 0.5 ML IM: CPT | Performed by: PHYSICIAN ASSISTANT

## 2017-11-07 PROCEDURE — 82948 REAGENT STRIP/BLOOD GLUCOSE: CPT

## 2017-11-07 PROCEDURE — 02HV33Z INSERTION OF INFUSION DEVICE INTO SUPERIOR VENA CAVA, PERCUTANEOUS APPROACH: ICD-10-PCS | Performed by: PODIATRIST

## 2017-11-07 PROCEDURE — 76937 US GUIDE VASCULAR ACCESS: CPT

## 2017-11-07 PROCEDURE — 77001 FLUOROGUIDE FOR VEIN DEVICE: CPT

## 2017-11-07 PROCEDURE — 36569 INSJ PICC 5 YR+ W/O IMAGING: CPT

## 2017-11-07 RX ORDER — SULFAMETHOXAZOLE AND TRIMETHOPRIM 800; 160 MG/1; MG/1
1 TABLET ORAL EVERY 12 HOURS SCHEDULED
Qty: 10 TABLET | Refills: 0 | Status: SHIPPED | OUTPATIENT
Start: 2017-11-07 | End: 2017-11-12

## 2017-11-07 RX ORDER — INSULIN GLARGINE 100 [IU]/ML
45 INJECTION, SOLUTION SUBCUTANEOUS
Status: DISCONTINUED | OUTPATIENT
Start: 2017-11-07 | End: 2017-11-07 | Stop reason: HOSPADM

## 2017-11-07 RX ORDER — ALPRAZOLAM 0.5 MG/1
0.5 TABLET ORAL 2 TIMES DAILY PRN
Qty: 30 TABLET | Refills: 0 | Status: SHIPPED | OUTPATIENT
Start: 2017-11-07 | End: 2022-07-02

## 2017-11-07 RX ORDER — INSULIN GLARGINE 100 [IU]/ML
40 INJECTION, SOLUTION SUBCUTANEOUS
Qty: 10 ML | Refills: 0 | Status: CANCELLED | OUTPATIENT
Start: 2017-11-07

## 2017-11-07 RX ADMIN — SULFAMETHOXAZOLE AND TRIMETHOPRIM 1 TABLET: 800; 160 TABLET ORAL at 09:00

## 2017-11-07 RX ADMIN — ACETAMINOPHEN 650 MG: 325 TABLET ORAL at 06:27

## 2017-11-07 RX ADMIN — ENOXAPARIN SODIUM 40 MG: 40 INJECTION SUBCUTANEOUS at 08:59

## 2017-11-07 RX ADMIN — LISINOPRIL 10 MG: 10 TABLET ORAL at 09:00

## 2017-11-07 RX ADMIN — NYSTATIN AND TRIAMCINOLONE ACETONIDE: 100000; 1 CREAM TOPICAL at 09:02

## 2017-11-07 RX ADMIN — AMPICILLIN SODIUM AND SULBACTAM SODIUM 3 G: 2; 1 INJECTION, POWDER, FOR SOLUTION INTRAMUSCULAR; INTRAVENOUS at 00:41

## 2017-11-07 RX ADMIN — INFLUENZA VIRUS VACCINE 0.5 ML: 15; 15; 15; 15 SUSPENSION INTRAMUSCULAR at 11:09

## 2017-11-07 RX ADMIN — AMPICILLIN SODIUM AND SULBACTAM SODIUM 3 G: 2; 1 INJECTION, POWDER, FOR SOLUTION INTRAMUSCULAR; INTRAVENOUS at 12:15

## 2017-11-07 RX ADMIN — ACETAMINOPHEN 650 MG: 325 TABLET ORAL at 00:41

## 2017-11-07 RX ADMIN — ALPRAZOLAM 0.5 MG: 0.5 TABLET ORAL at 07:09

## 2017-11-07 RX ADMIN — ACETAMINOPHEN 650 MG: 325 TABLET ORAL at 11:08

## 2017-11-07 RX ADMIN — AMPICILLIN SODIUM AND SULBACTAM SODIUM 3 G: 2; 1 INJECTION, POWDER, FOR SOLUTION INTRAMUSCULAR; INTRAVENOUS at 06:27

## 2017-11-07 RX ADMIN — INSULIN LISPRO 2 UNITS: 100 INJECTION, SOLUTION INTRAVENOUS; SUBCUTANEOUS at 11:17

## 2017-11-07 NOTE — SOCIAL WORK
CM received call from Sheila at [facility] that [employer] does not cover the insulin SHERYL wrote for, JANES made SHERYL Barnard aware and she wrote a script for the insulin that insurance does cover, CM sent new prescriptions to [facility] and made Pt aware

## 2017-11-07 NOTE — PROCEDURES
PICC Line Insertion  Date/Time: 11/7/2017 8:16 AM  Performed by: Stefanie Kiser  Authorized by: Alejo Holcomb     Patient location:  IR  Consent:     Consent obtained:  Written    Consent given by:  Patient    Risks discussed:  Arterial puncture, incorrect placement, nerve damage, bleeding and infection    Alternatives discussed:  No treatment, delayed treatment and alternative treatment  Universal protocol:     Procedure explained and questions answered to patient or proxy's satisfaction: yes      Relevant documents present and verified: yes      Test results available and properly labeled: yes      Imaging studies available: yes      Required blood products, implants, devices, and special equipment available: yes      Site/side marked: yes      Immediately prior to procedure, a time out was called: yes      Patient identity confirmed:  Verbally with patient, arm band and provided demographic data  Pre-procedure details:     Hand hygiene: Hand hygiene performed prior to insertion      Sterile barrier technique: All elements of maximal sterile technique followed      Skin preparation:  ChloraPrep    Skin preparation agent: Skin preparation agent completely dried prior to procedure    Indications:     PICC line indications: long term antibiotics and new site    Anesthesia (see MAR for exact dosages):      Anesthesia method:  Local infiltration    Local anesthetic:  Lidocaine 1% w/o epi  Procedure details:     Location:  Basilic    Vessel type: vein      Laterality:  Right    Approach: percutaneous technique used      Patient position:  Flat    Procedural supplies:  Double lumen    Catheter size:  5 Fr    Landmarks identified: yes      Ultrasound guidance: yes      Sterile ultrasound techniques: Sterile gel and sterile probe covers were used      Number of attempts:  1    Successful placement: yes      Total catheter length (cm):  44cm    Catheter out on skin (cm):  0cm therese at the skin    Max flow rate:  5mL/sec Arm circumference:  31 5cm  Post-procedure details:     Post-procedure:  Dressing applied and securement device placed    Assessment:  Blood return through all ports and placement verified by x-ray    Post-procedure complications: none      Patient tolerance of procedure:   Tolerated well, no immediate complications    Observer: Yes      Observer name:  Damaris PRICE

## 2017-11-07 NOTE — DISCHARGE SUMMARY
Discharge Summary - [facility] Internal Medicine    Patient Information: Bipin King 45 y o  male MRN: [id number]  Unit/Bed#: -01 Encounter: [id number]    Discharging Physician / Practitioner: Sue Jean PA-C  PCP: Tj Locke DO  Admission Date: 10/30/2017  Discharge Date: 11/07/17    Reason for Admission: Left foot ulcer    Discharge Diagnoses:     Principal Problem:    Diabetic ulcer of left foot associated with diabetes mellitus due to underlying condition, with necrosis of muscle ([facility] )  Active Problems:    Essential hypertension    Uncontrolled type 2 diabetes mellitus with foot ulcer, without long-term current use of insulin (HCC)    Sepsis ([facility] )    Bacteremia    PFO (patent foramen ovale)  Resolved Problems:    Gas gangrene of foot ([facility] )    Diabetic foot infection ([facility] )    Acute osteomyelitis of toe, left ([facility] )    Hyponatremia    Hypokalemia      Consultations During Hospital Stay:  · Endocrinology  · Dr Otilio Posada  · Dr Camila Mahajan    Procedures Performed:   · POD #8 s/p guillotine left hallux amputation with irrigation and drainage  · POD #4 s/p left 1st partial ray amputation  · PICC line  · Echocardiogram - Systolic function was normal  Ejection fraction was estimated to be 60 %  There were no regional wall motion abnormalities  Wall thickness was mildly increased  The changes were consistent with concentric remodeling (increased wall thickness with normal wall mass)  Doppler parameters were consistent with abnormal left ventricular relaxation (grade 1 diastolic dysfunction)      RIGHT VENTRICLE:  The size was normal   Systolic function was normal      LEFT ATRIUM:  The atrium was mildly dilated      ATRIAL SEPTUM:  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  There was a probable small patent foramen ovale with left to right shunt      AORTA:  The root exhibited mild dilatation (3 9 cm at sinus of Valsalva)      Significant Findings / Test Results:     · Diabetic foot ulcer    Incidental Findings: · Small PFO    Test Results Pending at Discharge (will require follow up):   · none     Outpatient Tests Requested:  · none    Complications:  none    Hospital Course:     Sakshi Louise is a 45 y o  male patient who originally presented to the hospital on 10/30/2017 due to left diabetic foot ulcer  Patient's discharge problem list is as follows:   1 ) XR left foot on admission: Extensive subcutaneous air noted at the 1st toe extending to the 1st metatarsal region along with soft tissue swelling  Correlate for gas gangrene  Comminuted fracture at the 1st distal phalanx proximally  Fracture margins are ill-defined, underlying osteomyelitis is not excluded      · Podiatry following  ? POD #8 s/p guillotine left hallux amputation with irrigation and drainage  ? POD #4 s/p left 1st partial ray amputation  ? Patient will have VNA for wound care and IV antibiotics  Patient will follow up with Podiatry in 1 week  · ID following  ? Initial blood cultures: 1/2 positive for peptostreptococcus prevotii  ? Wound cultures: Alcaligenes species, Beta Hemolytic Streptococcus Group G and anaerobic GNR  § Antibiotics changed to IV Unasyn and PO Bactrim  Needs a total of 14 days of IV antibiotics per ID  Patient on day 9 of 14 of antibiotics on day of discharge  Patient will need PICC line discontinued once antibiotics completed    2 )  Severe sepsis present on arrival events by leukocytosis, tachycardia, and elevated lactic acid with bacteremia-patient clinically improved  3 )  Uncontrolled type 2 diabetes with foot ulcer-patient's hemoglobin A1c is 10 3  Patient was seen in consultation by Endocrinology  He was placed on Lantus and mealtime insulin  Patient will need to follow up with Endocrinology as an outpatient  Patient was given education on how to administer insulin and feels comfortable administering this at home    4 ) HTN - stable on home medication regimen  5 )  Incidental finding of a small PFO-discussed with Cardiology  Since patient is clinically stable, no further intervention is required at this time  6 ) Anxiety - xanax prn  PADMP website accessed    Condition at Discharge: good     Discharge Day Visit / Exam:     Subjective:  NO complaints  Vitals: Blood Pressure: 130/76 (11/07/17 0715)  Pulse: 80 (11/07/17 0715)  Temperature: 97 6 °F (36 4 °C) (11/07/17 0715)  Temp Source: Oral (11/07/17 0715)  Respirations: 18 (11/07/17 0715)  Height: 6' 3" (190 5 cm) (11/03/17 8682)  Weight - Scale: 107 kg (236 lb) (11/03/17 0702)  SpO2: 93 % (11/07/17 0715)  Exam:   Physical Exam   Constitutional: He is oriented to person, place, and time  He appears well-developed  No distress  HENT:   Head: Normocephalic and atraumatic  Neck: Normal range of motion  Neck supple  Cardiovascular: Normal rate and regular rhythm  No murmur heard  Pulmonary/Chest: Effort normal and breath sounds normal  No respiratory distress  He has no wheezes  He has no rales  Abdominal: Soft  Bowel sounds are normal  He exhibits no distension  Musculoskeletal: He exhibits no edema  Neurological: He is alert and oriented to person, place, and time  No cranial nerve deficit  Skin: Skin is warm and dry  No rash noted  Left foot dressing intact   Psychiatric: He has a normal mood and affect  Discussion with Family: none    Discharge instructions/Information to patient and family:   See after visit summary for information provided to patient and family  Provisions for Follow-Up Care:  See after visit summary for information related to follow-up care and any pertinent home health orders  Disposition:     Home with VNA Services (Reminder: Complete face to face encounter)    For Discharges to [facility] SNF:   · Not Applicable to this Patient - Not Applicable to this Patient    Planned Readmission: none     Discharge Statement:  I spent 50 minutes discharging the patient   This time was spent on the day of discharge  I had direct contact with the patient on the day of discharge  Greater than 50% of the total time was spent examining patient, answering all patient questions, arranging and discussing plan of care with patient as well as directly providing post-discharge instructions  Additional time then spent on discharge activities  Discharge Medications:  See after visit summary for reconciled discharge medications provided to patient and family        ** Please Note: This note has been constructed using a voice recognition system **

## 2017-11-07 NOTE — MEDICAL STUDENT
S:    Patient states he is feeling well today  States he is ambulating well with the crutches and following physical therapy  He states his pain is well-controlled with Tylenol  He sleeps, eats, and drinks well  He denies fevers, sweats, chills, headaches, and dizziness  No CP or SOB  No N/V/diarrhea  No change in bowel or bladder habits  O:     Physical Exam  Vitals:  T 97 6                 P 80                 R 18                 /76                   O2 (RA) 93%  WDWN, pleasant male in no distress, comfortably sitting in bed  Alert and oriented  RRR, no murmurs  Lungs clear to auscultation bilaterally, no increased work of breathing  Normal bowel sounds x 4  Abdomen soft and non-tender to palpation  Skin is warm and dry, no rash noted  L hallux amputation dressing in tact  *(patient states surgeon examined this morning and was "very pleased" with appearance)*  Sensation in tact bilaterally  Pulses present  No edema      Labs Reviewed  Today's   BMP 11/4 WNL  CBC w diff 11/4: WBC 12 07    Imaging  IR PICC line placed this morning for continued Unasyn following discharge  Echo 11/5/17 revealed PFO with left to right shunt, no signs of endocarditis      A&P:    1  Post L hallux amputation following diabetic ulcer with 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  · Continue IV Unasyn and PO Bactrim  · ID and Podiatry following, appreciate recommendations  · Appreciate Physical Therapy recommendations   · Type 2 Diabetes management following discharge  2  Bacteremia  · Sepsis POA resolved, fever resolved and WBC improved  · Blood cultures 1/2 positive for peptostreptococcus  · Echo unremarkable for endocarditis  · See above plan  3  PFO  · Incidental finding  Patient does not have signs of endocarditis on echo and has no symptoms  · Appreciate Cardiology input, no further follow-up needed at this time  4   Type 2 Diabetes Mellitus, uncontrolled  · Hg A1c 10 3,  this morning  · Continue insulin sliding scale  · Follow-up with endocrinology for diabetes management following discharge  Patient has stated he would like to see a new PCP, as he is not pleased with his current diabetes management, will provide references  · Lifestyle modifications  5   HTN  · Continue Lisinopril 10 mg tablet PO daily  · Follow-up with PCP for HTN management     VIRIDIANA HaasS

## 2017-11-07 NOTE — PLAN OF CARE
Problem: DISCHARGE PLANNING - CARE MANAGEMENT  Goal: Discharge to post-acute care or home with appropriate resources  INTERVENTIONS:  - Conduct assessment to determine patient/family and health care team treatment goals, and need for post-acute services based on payer coverage, community resources, and patient preferences, and barriers to discharge  - Address psychosocial, clinical, and financial barriers to discharge as identified in assessment in conjunction with the patient/family and health care team  - Arrange appropriate level of post-acute services according to patient's   needs and preference and payer coverage in collaboration with the physician and health care team  - Communicate with and update the patient/family, physician, and health care team regarding progress on the discharge plan  - Arrange appropriate transportation to post-acute venues    Outcome: Adequate for Discharge  Pt agreeable to getting home antibiotics through [facility]  CM sent prescription to [facility], per [facility] antibiotics are covered at 100%  CM made [facility] aware that Pt's 1st dose at home would be at 6pm tonight  Per [facility] they will have a nurse out between 5-6pm  [facility] spoke to Pt's wife who will be home all afternoon to accept delivery, CM made [facility] aware via phone that the delivery needs to be there by 5pm  CM made Pt aware of coverage of home antibiotics and times  CM discussed Meds to [facility] program and Pt would like prescriptions sent to [facility] and delivered  CM sent scripts  CM dept will follow Pt until discharge

## 2017-11-07 NOTE — PROGRESS NOTES
Progress Note - Infectious Disease   Renee Moreno 45 y o  male MRN: [id number]  Unit/Bed#: -01 Encounter: [id number]      Impression/Plan:  1   Left foot gas gangrene  Patient is now status post guillotine amputation of the left hallux   Operative culture with GGS, Alcaligenes and anaerobic GNR   Patient is clinically much improved   He is status post I&D x2, with surgical cure  -IV Unasyn plan as in below      -continue p o  Bactrim x same duration  -local wound care per Podiatry   -serial exams  -monitor temperature/WBC     2   Severe sepsis-POA   Leukocytosis and tachycardia with elevated lactate   Appears all secondary to 1   No other clear source appreciated   Patient is clinically much improved   Sepsis has resolved   Fever has resolved   WBC improved     -antibiotics as above and below  -follow-up blood cultures  -supportive care  -no additional ID workup for now     3   Peptostreptococcus bacteremia   Source is most likely polymicrobic foot infection  Patient is clinically much improved  Bacteremia clearing  2D echo without vegetation   -continue IV Unasyn x 14 days total, another 5 days  -follow-up final repeat blood cultures  -patient will need surveillance blood cultures 2 weeks after completion of IV antibiotic      4  Diabetes mellitus-type 2 with hyperglycemia   Very poorly controlled diabetes at this time   -blood sugar control per the primary service     5   Hyponatremia-likely pseudohyponatremia secondary to the hyperglycemia   Improved with better glucose control   -monitor BMP     Discussed with patient in detail regarding the above plan  Okay for discharge from ID viewpoint  Rx for antibiotic and repeat blood cultures on chart      Antibiotics:  Unasyn/Bactrim (Antibiotic # 9)  POD # 8     Subjective:  Patient feels well  He has minimal foot pain  Temperature stays down   No chills/sweats  He is tolerating antibiotics well   No nausea, vomiting or diarrhea  Objective:  Vitals:  HR:  [80-86] 80  Resp:  [18] 18  BP: (130-161)/(51-93) 130/76  SpO2:  [90 %-93 %] 93 %  Temp (24hrs), Av 2 °F (36 8 °C), Min:97 6 °F (36 4 °C), Max:98 7 °F (37 1 °C)  Current: Temperature: 97 6 °F (36 4 °C)    Physical Exam:     General: Awake, alert, cooperative, no distress  Lungs: Expansion symmetric, no rales, no wheezing, respirations unlabored  Heart[de-identified]  Regular rate and rhythm, S1 and S2 normal, no murmur  Abdomen: Soft, nondistended, non-tender, bowel sounds active all four quadrants,        no masses, no organomegaly  Extremities: Left foot with dressing in place  Dressing is dry  Minimal edema  No erythema  Minimal tenderness  Skin:  No rash  Invasive Devices     Peripherally Inserted Central Catheter Line            PICC Line 17 less than 1 day          Peripheral Intravenous Line            Peripheral IV 17 Right Forearm 1 day                Labs studies:   I have personally reviewed pertinent labs  Results from last 7 days  Lab Units 17  0521 17  0451 17  0550   SODIUM mmol/L 138 141 138   POTASSIUM mmol/L 3 6 3 3* 3 4*   CHLORIDE mmol/L 103 105 105   CO2 mmol/L 27 29 26   ANION GAP mmol/L 8 7 7   BUN mg/dL 11 8 8   CREATININE mg/dL 0 90 0 97 0 83   EGFR ml/min/1 73sq m 96 88 100   GLUCOSE RANDOM mg/dL 151* 86 133   CALCIUM mg/dL 8 4 8 5 8 3       Results from last 7 days  Lab Units 17  0521 17  0451 17  0550   WBC Thousand/uL 12 07* 10 22* 10 28*   HEMOGLOBIN g/dL 11 0* 11 5* 11 1*   PLATELETS Thousands/uL 527* 574* 529*       Results from last 7 days  Lab Units 17  1057   BLOOD CULTURE  No Growth After 4 Days  No Growth After 4 Days  Imaging Studies:   I have personally reviewed pertinent imaging study reports and images in PACS  EKG, Pathology, and Other Studies:   I have personally reviewed pertinent reports

## 2017-11-07 NOTE — SOCIAL WORK
Pt agreeable to getting home antibiotics through [facility]  CM sent prescription to [facility], per [facility] antibiotics are covered at 100%  CM made [facility] aware that Pt's 1st dose at home would be at 6pm tonight  Per [facility] they will have a nurse out between 5-6pm  [facility] spoke to Pt's wife who will be home all afternoon to accept delivery, CM made [facility] aware via phone that the delivery needs to be there by 5pm  CM made Pt aware of coverage of home antibiotics and times  CM discussed Meds to [facility] program and Pt would like prescriptions sent to [facility] and delivered  CM sent scripts  CM dept will follow Pt until discharge

## 2017-11-15 ENCOUNTER — GENERIC CONVERSION - ENCOUNTER (OUTPATIENT)
Dept: OTHER | Facility: OTHER | Age: 52
End: 2017-11-15

## 2017-11-21 ENCOUNTER — APPOINTMENT (OUTPATIENT)
Dept: WOUND CARE | Facility: HOSPITAL | Age: 52
End: 2017-11-21
Payer: COMMERCIAL

## 2017-11-21 PROCEDURE — 99213 OFFICE O/P EST LOW 20 MIN: CPT | Performed by: PODIATRIST

## 2017-11-28 ENCOUNTER — APPOINTMENT (OUTPATIENT)
Dept: WOUND CARE | Facility: HOSPITAL | Age: 52
End: 2017-11-28
Payer: COMMERCIAL

## 2017-11-28 PROCEDURE — 11042 DBRDMT SUBQ TIS 1ST 20SQCM/<: CPT | Performed by: PODIATRIST

## 2017-12-05 ENCOUNTER — APPOINTMENT (OUTPATIENT)
Dept: WOUND CARE | Facility: HOSPITAL | Age: 52
End: 2017-12-05
Payer: COMMERCIAL

## 2017-12-05 PROCEDURE — 11042 DBRDMT SUBQ TIS 1ST 20SQCM/<: CPT | Performed by: PODIATRIST

## 2017-12-19 ENCOUNTER — APPOINTMENT (OUTPATIENT)
Dept: WOUND CARE | Facility: HOSPITAL | Age: 52
End: 2017-12-19
Payer: COMMERCIAL

## 2017-12-19 PROCEDURE — 11042 DBRDMT SUBQ TIS 1ST 20SQCM/<: CPT | Performed by: PODIATRIST

## 2017-12-26 ENCOUNTER — APPOINTMENT (OUTPATIENT)
Dept: WOUND CARE | Facility: HOSPITAL | Age: 52
End: 2017-12-26
Payer: COMMERCIAL

## 2017-12-26 PROCEDURE — 11042 DBRDMT SUBQ TIS 1ST 20SQCM/<: CPT | Performed by: PODIATRIST

## 2018-01-10 NOTE — RESULT NOTES
Verified Results  (1) TISSUE EXAM [id number] 08:11PM Anaya Pires     Test Name Result Flag Reference   LAB AP CASE REPORT (Report)     Surgical Pathology Report             Case: [id number]                   Authorizing Provider: Juma Esposito DPM  Collected:      10/30/2017 2011        Ordering Location:   [facility]    Received:      10/30/2017 2034                    [facility]                            Pathologist:      Estelita Hewitt MD                                Specimen:  LEFT FOOT TMA   LAB AP FINAL DIAGNOSIS (Report)     A  left foot TMA:  -Gangrenou sulceration of the left foot, 6 4 x 4 2 cm  -The inflammation extends deep to involve subcutaneous and periosteal soft   tissue   -Underlying bone with marked acute osteomyelitis  -The soft tissue at surgical resection margin is free from inflammation by   0 1 cm  -Mild acute osteomyelitis at surgical resection margin   -Benign non- inflamed Sesamoid bone  -Negative for malignancy  Electronically signed by Estelita Hewitt MD on 11/8/2017 at 1:19 PM   LAB AP NOTE (Report)     Interpretation performed at [facility], [address] [place] [address] notes  Preop Diagnosis:  Diabetic foot infection ([address] ) [E11 69, L08 9]  Acute osteomyelitis of toe, left ([address] ) [M86 172]  Gas gangrene of foot ([address] ) [A48 0]  Post-Op Diagnosis Codes:    * Diabetic foot infection ([address] ) [E11 69, L08 9]    * Acute osteomyelitis of toe, left ([address] ) [M86 172]    * Gas gangrene of foot ([address] ) [A48 0]    Procedure:  Left 1st partial ray amputation   LAB AP SURGICAL ADDITIONAL INFORMATION (Report)     All controls performed with the immunohistochemical stains reported above   reacted appropriately  These tests were developed and their performance   characteristics determined by [facility] or   [facility]  They may not be cleared or approved by the [place]  Food and Drug Administration   The FDA has determined that such clearance   or approval is not necessary  These tests are used for clinical purposes  They should not be regarded as investigational or for research  This   laboratory has been approved by [facility], designated as a high-complexity   laboratory and is qualified to perform these tests  LAB AP GROSS DESCRIPTION (Report)     A  The specimen is received in formalin, labeled with the patient's name   and hospital number, and is designated left foot TMA  The specimen   consists of a digit #1, with attached nail, which measures 9 4 x 5 2 x 4 1   cm  The dorsal and lateral surface exhibits a brown black   ulcerated/gangrenous focus which measures the 6 4 x 4 2 cm, and appears to   come within 0 1 cm of nearest soft tissue margin of resection, and 2 3 cm   from bony margin of resection  The nearest soft tissue margin is inked   blue  Also submitted in the container are 2 additional portions of osseous   tissue, which exhibit smooth articular surfaces, connected by tan fibrous   tissue, consistent with sesamoid, which measure 1 9 x 1 2 x 1 0 cm, and   1 3 x 1 2 x 1 1 cm  The bone underneath focus is hemorrhagic and friable  Also submitted in the container is an irregular shaped nonhairbearing   portion of skin which measures 2 9 x 1 9 cm with attached soft tissue to a   depth of 1 9 cm  The surface exhibits a tan brown ulcerated/gangrenous   focus which measures 1 0 x 0 9 cm and is contacting the nearest peripheral   margin  The margin of resection is inked blue  Please note: As per the op   notes, the specimen is a left hallux amputation and not a transmetatarsal   amputation  Representative sections  Six cassettes      1: Perpendicular sections of gangrenous focus showing closest approach to   inked soft tissue margin  2: Bone underneath focus, after decalcification in Decal II  3: Separately submitted portion of skin with gangrenous focus showing   closest approach to peripheral edge  4: Shaved bony margin of resection, after decalcification in Decal II  5: Portion of bone adjacent to margin in cassette 4, after decalcification   in Decal II  6: Sesamoid    Note: The estimated total formalin fixation time based upon information   provided by the submitting clinician and the standard processing schedule   is over 72 hours     Hosie Cushing

## 2018-01-13 NOTE — PROCEDURES
Procedures by Ranjan Garcia at 11/7/2017   8:15 AM      Author:  Ranjan Garcia Service:  Interventional Radiology  Author Type:  Technologist    Filed:  11/7/2017  8:53 AM Date of Service:  11/7/2017  8:15 AM Status:  Attested    :  Ranjan Garcia (Technologist)  Cosigner:  Jacinta Ventura MD at 11/7/2017 10:13 AM      Procedure Orders:       1  Insert PICC line [[id number]] ordered by Ranjan Garcia at 11/07/17 8083               Attestation signed by Jacinta Ventura MD at 11/7/2017 10:13 AM      I attest that this procedure was performed under my Torie Weiss MD) direct supervision  PICC Line Insertion  Date/Time: 11/7/2017 8:16 AM  Performed by: Jamia Villanueva  Authorized by: Mayra Elmore     Patient location:  IR  Consent:     Consent obtained:  Written    Consent given by:  Patient    Risks discussed:  Arterial puncture, incorrect placement, nerve damage, bleeding and infection    Alternatives discussed:  No treatment, delayed treatment and alternative treatment  Universal protocol:     Procedure explained and questions answered to patient or proxy's satisfaction: yes      Relevant documents present and verified: yes      Test results available and properly labeled: yes       Imaging studies available: yes      Required blood products, implants, devices, and special equipment available: yes      Site/side marked: yes      Immediately prior to procedure, a time out was called: yes      Patient identity confirmed:  Verbally with patient, arm band and provided demographic data  Pre-procedure details:     Hand hygiene: Hand hygiene performed prior to insertion      Sterile barrier technique:  All elements of maximal sterile technique followed      Skin preparation:  ChloraPrep    Skin preparation agent: Skin preparation agent completely dried prior to procedure    Indications:     PICC line indications: long term antibiotics and new site    Anesthesia (see MAR for exact dosages): Anesthesia method:  Local infiltration    Local anesthetic:  Lidocaine 1% w/o epi  Procedure details:     Location:  Basilic    Vessel type: vein      Laterality:  Right    Approach: percutaneous technique used      Patient position:  Flat    Procedural supplies:  Double lumen    Catheter size:  5 Fr    Landmarks identified: yes      Ultrasound guidance: yes      Sterile ultrasound techniques: Sterile gel and sterile probe covers were used      Number of attempts:  1    Successful placement: yes      Total catheter length (cm):  44cm    Catheter out on skin (cm):  0cm therese at the skin    Max flow rate:  5mL/sec    Arm circumference:  31 5cm  Post-procedure details:     Post-procedure:  Dressing applied and securement device placed    Assessment:  Blood return through all ports and placement verified by x-ray    Post-procedure complications: none      Patient tolerance of procedure: Tolerated well, no immediate complications    Observer: Yes      Observer name:  Alex PRICE                        Received for:Provider  EPIC   Nov 7 2017 10:13AM [facility] Standard Time

## 2018-01-15 NOTE — RESULT NOTES
Verified Results  (1) TISSUE EXAM [id number] 07:50AM Jaycee Gurrola     Test Name Result Flag Reference   LAB AP CASE REPORT (Report)     Surgical Pathology Report             Case: [id number]                   Authorizing Provider: Eva Arndt DPM  Collected:      11/03/2017 0750        Ordering Location:   [facility]    Received:      11/03/2017 [facility]                            Pathologist:      Julio Fuentes MD                              Specimen:  Bone, left foot 1st metatarsul   LAB AP FINAL DIAGNOSIS (Report)     A  Bone, left foot, first metatarsal, excision:        - Benign lamellar bone (see note)  - No dysplasia or malignancy is identified  Interpretation performed at [facility], [address] , [place], [facility]  Electronically signed by Julio Fuentes MD on 11/10/2017 at 11:51 AM   LAB AP NOTE      Very focally, there is disruption of bony fragments and a suggestion of a   moth-eaten appearance; however, no neutrophils or plasma cells are   identified in this area to suggest osteomyelitis area clinical correlation   is required  LAB AP SURGICAL ADDITIONAL INFORMATION (Report)     All controls performed with the immunohistochemical stains reported above   reacted appropriately  These tests were developed and their performance   characteristics determined by [facility] or   [facility]  They may not be cleared or approved by the [place]  Food and Drug Administration  The FDA has determined that such clearance   or approval is not necessary  These tests are used for clinical purposes  They should not be regarded as investigational or for research  This   laboratory has been approved by CLIA 88, designated as a high-complexity   laboratory and is qualified to perform these tests  LAB AP GROSS DESCRIPTION (Report)     A   The specimen is received in formalin, labeled with the patient's name   and hospital number, and is designated left foot first metatarsal, is a   tan portion of osseous tissue consistent with metatarsal measuring 6 3 x   2 7 x 2 5 cm  There is one articular surface and one smooth resection   margin  The proximal margin is shaved and submitted in cassette A1   following decalcification in decal two  Note: The estimated total formalin fixation time based upon information   provided by the submitting clinician and the standard processing schedule   is over 72 hours        Odette Kim

## 2018-02-20 ENCOUNTER — APPOINTMENT (OUTPATIENT)
Dept: WOUND CARE | Facility: HOSPITAL | Age: 53
End: 2018-02-20
Payer: COMMERCIAL

## 2018-02-20 PROCEDURE — 99213 OFFICE O/P EST LOW 20 MIN: CPT | Performed by: PODIATRIST

## 2018-05-03 ENCOUNTER — APPOINTMENT (EMERGENCY)
Dept: RADIOLOGY | Facility: HOSPITAL | Age: 53
End: 2018-05-03
Payer: COMMERCIAL

## 2018-05-03 ENCOUNTER — HOSPITAL ENCOUNTER (EMERGENCY)
Facility: HOSPITAL | Age: 53
Discharge: HOME | End: 2018-05-03
Attending: EMERGENCY MEDICINE
Payer: COMMERCIAL

## 2018-05-03 ENCOUNTER — APPOINTMENT (EMERGENCY)
Dept: CT IMAGING | Facility: HOSPITAL | Age: 53
End: 2018-05-03
Payer: COMMERCIAL

## 2018-05-03 VITALS
HEART RATE: 76 BPM | TEMPERATURE: 97.6 F | OXYGEN SATURATION: 94 % | WEIGHT: 253.53 LBS | SYSTOLIC BLOOD PRESSURE: 180 MMHG | RESPIRATION RATE: 20 BRPM | BODY MASS INDEX: 31.69 KG/M2 | DIASTOLIC BLOOD PRESSURE: 98 MMHG

## 2018-05-03 DIAGNOSIS — R10.11 ABDOMINAL PAIN, ACUTE, RIGHT UPPER QUADRANT: Primary | ICD-10-CM

## 2018-05-03 LAB
ALBUMIN SERPL BCP-MCNC: 3.7 G/DL (ref 3.5–5)
ALP SERPL-CCNC: 63 U/L (ref 46–116)
ALT SERPL W P-5'-P-CCNC: 35 U/L (ref 12–78)
ANION GAP SERPL CALCULATED.3IONS-SCNC: 12 MMOL/L (ref 4–13)
APTT PPP: 25 SECONDS (ref 23–35)
AST SERPL W P-5'-P-CCNC: 14 U/L (ref 5–45)
ATRIAL RATE: 77 BPM
BASE EX.OXY STD BLDV CALC-SCNC: 65.3 % (ref 60–80)
BASE EXCESS BLDV CALC-SCNC: 1.6 MMOL/L
BASOPHILS # BLD AUTO: 0.04 THOUSANDS/ΜL (ref 0–0.1)
BASOPHILS NFR BLD AUTO: 0 % (ref 0–1)
BILIRUB DIRECT SERPL-MCNC: 0.3 MG/DL (ref 0–0.2)
BILIRUB SERPL-MCNC: 2 MG/DL (ref 0.2–1)
BUN SERPL-MCNC: 16 MG/DL (ref 5–25)
CALCIUM SERPL-MCNC: 8.6 MG/DL (ref 8.3–10.1)
CHLORIDE SERPL-SCNC: 98 MMOL/L (ref 100–108)
CO2 SERPL-SCNC: 24 MMOL/L (ref 21–32)
CREAT SERPL-MCNC: 1.07 MG/DL (ref 0.6–1.3)
EOSINOPHIL # BLD AUTO: 0.04 THOUSAND/ΜL (ref 0–0.61)
EOSINOPHIL NFR BLD AUTO: 0 % (ref 0–6)
ERYTHROCYTE [DISTWIDTH] IN BLOOD BY AUTOMATED COUNT: 13 % (ref 11.6–15.1)
GFR SERPL CREATININE-BSD FRML MDRD: 78 ML/MIN/1.73SQ M
GLUCOSE SERPL-MCNC: 198 MG/DL (ref 65–140)
GLUCOSE SERPL-MCNC: 218 MG/DL (ref 65–140)
HCO3 BLDV-SCNC: 21.9 MMOL/L (ref 24–30)
HCT VFR BLD AUTO: 45.9 % (ref 36.5–49.3)
HGB BLD-MCNC: 16.7 G/DL (ref 12–17)
INR PPP: 0.84 (ref 0.86–1.16)
LACTATE SERPL-SCNC: 2.6 MMOL/L (ref 0.5–2)
LIPASE SERPL-CCNC: 190 U/L (ref 73–393)
LYMPHOCYTES # BLD AUTO: 2.48 THOUSANDS/ΜL (ref 0.6–4.47)
LYMPHOCYTES NFR BLD AUTO: 22 % (ref 14–44)
MCH RBC QN AUTO: 28.8 PG (ref 26.8–34.3)
MCHC RBC AUTO-ENTMCNC: 36.4 G/DL (ref 31.4–37.4)
MCV RBC AUTO: 79 FL (ref 82–98)
MONOCYTES # BLD AUTO: 0.54 THOUSAND/ΜL (ref 0.17–1.22)
MONOCYTES NFR BLD AUTO: 5 % (ref 4–12)
NEUTROPHILS # BLD AUTO: 8.06 THOUSANDS/ΜL (ref 1.85–7.62)
NEUTS SEG NFR BLD AUTO: 73 % (ref 43–75)
O2 CT BLDV-SCNC: 15.8 ML/DL
P AXIS: 66 DEGREES
PCO2 BLDV: 25.3 MM HG (ref 42–50)
PH BLDV: 7.55 [PH] (ref 7.3–7.4)
PLATELET # BLD AUTO: 341 THOUSANDS/UL (ref 149–390)
PMV BLD AUTO: 9 FL (ref 8.9–12.7)
PO2 BLDV: 27 MM HG (ref 35–45)
POTASSIUM SERPL-SCNC: 3.6 MMOL/L (ref 3.5–5.3)
PR INTERVAL: 148 MS
PROT SERPL-MCNC: 6.8 G/DL (ref 6.4–8.2)
PROTHROMBIN TIME: 11.7 SECONDS (ref 12.1–14.4)
QRS AXIS: 62 DEGREES
QRSD INTERVAL: 96 MS
QT INTERVAL: 368 MS
QTC INTERVAL: 416 MS
RBC # BLD AUTO: 5.8 MILLION/UL (ref 3.88–5.62)
SODIUM SERPL-SCNC: 134 MMOL/L (ref 136–145)
T WAVE AXIS: 47 DEGREES
TROPONIN I SERPL-MCNC: <0.02 NG/ML
VENTRICULAR RATE: 77 BPM
WBC # BLD AUTO: 11.16 THOUSAND/UL (ref 4.31–10.16)

## 2018-05-03 PROCEDURE — 93010 ELECTROCARDIOGRAM REPORT: CPT | Performed by: INTERNAL MEDICINE

## 2018-05-03 PROCEDURE — 99285 EMERGENCY DEPT VISIT HI MDM: CPT

## 2018-05-03 PROCEDURE — 93005 ELECTROCARDIOGRAM TRACING: CPT | Performed by: EMERGENCY MEDICINE

## 2018-05-03 PROCEDURE — 82948 REAGENT STRIP/BLOOD GLUCOSE: CPT

## 2018-05-03 PROCEDURE — 80076 HEPATIC FUNCTION PANEL: CPT | Performed by: EMERGENCY MEDICINE

## 2018-05-03 PROCEDURE — 83605 ASSAY OF LACTIC ACID: CPT | Performed by: EMERGENCY MEDICINE

## 2018-05-03 PROCEDURE — 93005 ELECTROCARDIOGRAM TRACING: CPT

## 2018-05-03 PROCEDURE — 96375 TX/PRO/DX INJ NEW DRUG ADDON: CPT

## 2018-05-03 PROCEDURE — 85025 COMPLETE CBC W/AUTO DIFF WBC: CPT | Performed by: EMERGENCY MEDICINE

## 2018-05-03 PROCEDURE — 36415 COLL VENOUS BLD VENIPUNCTURE: CPT | Performed by: EMERGENCY MEDICINE

## 2018-05-03 PROCEDURE — 85610 PROTHROMBIN TIME: CPT | Performed by: EMERGENCY MEDICINE

## 2018-05-03 PROCEDURE — 96374 THER/PROPH/DIAG INJ IV PUSH: CPT

## 2018-05-03 PROCEDURE — 83690 ASSAY OF LIPASE: CPT | Performed by: EMERGENCY MEDICINE

## 2018-05-03 PROCEDURE — 85730 THROMBOPLASTIN TIME PARTIAL: CPT | Performed by: EMERGENCY MEDICINE

## 2018-05-03 PROCEDURE — 96361 HYDRATE IV INFUSION ADD-ON: CPT

## 2018-05-03 PROCEDURE — 82805 BLOOD GASES W/O2 SATURATION: CPT | Performed by: EMERGENCY MEDICINE

## 2018-05-03 PROCEDURE — 74177 CT ABD & PELVIS W/CONTRAST: CPT

## 2018-05-03 PROCEDURE — 84484 ASSAY OF TROPONIN QUANT: CPT | Performed by: EMERGENCY MEDICINE

## 2018-05-03 PROCEDURE — 80048 BASIC METABOLIC PNL TOTAL CA: CPT | Performed by: EMERGENCY MEDICINE

## 2018-05-03 PROCEDURE — 71046 X-RAY EXAM CHEST 2 VIEWS: CPT

## 2018-05-03 RX ORDER — PANTOPRAZOLE SODIUM 40 MG/1
40 TABLET, DELAYED RELEASE ORAL DAILY
COMMUNITY
End: 2022-07-02

## 2018-05-03 RX ORDER — ATORVASTATIN CALCIUM 20 MG/1
20 TABLET, FILM COATED ORAL DAILY
COMMUNITY
End: 2022-07-02

## 2018-05-03 RX ORDER — MORPHINE SULFATE 2 MG/ML
2 INJECTION, SOLUTION INTRAMUSCULAR; INTRAVENOUS ONCE
Status: COMPLETED | OUTPATIENT
Start: 2018-05-03 | End: 2018-05-03

## 2018-05-03 RX ORDER — ONDANSETRON 2 MG/ML
4 INJECTION INTRAMUSCULAR; INTRAVENOUS ONCE
Status: COMPLETED | OUTPATIENT
Start: 2018-05-03 | End: 2018-05-03

## 2018-05-03 RX ORDER — METFORMIN HYDROCHLORIDE 750 MG/1
750 TABLET, EXTENDED RELEASE ORAL
COMMUNITY
End: 2022-07-02

## 2018-05-03 RX ADMIN — SODIUM CHLORIDE 1000 ML: 0.9 INJECTION, SOLUTION INTRAVENOUS at 10:41

## 2018-05-03 RX ADMIN — MORPHINE SULFATE 2 MG: 2 INJECTION, SOLUTION INTRAMUSCULAR; INTRAVENOUS at 10:44

## 2018-05-03 RX ADMIN — ONDANSETRON 4 MG: 2 INJECTION INTRAMUSCULAR; INTRAVENOUS at 10:44

## 2018-05-03 RX ADMIN — SODIUM CHLORIDE 1000 ML: 0.9 INJECTION, SOLUTION INTRAVENOUS at 12:41

## 2018-05-03 RX ADMIN — IOHEXOL 100 ML: 350 INJECTION, SOLUTION INTRAVENOUS at 11:37

## 2018-05-03 NOTE — ED PROVIDER NOTES
History  Chief Complaint   Patient presents with    Abdominal Pain     pt c/o LUQ abd pain started sunday, SOB with pain  nausea/vomiting denies diarrhea  History provided by:  Patient and spouse  Abdominal Pain   Pain location:  Epigastric  Pain quality: aching    Pain radiates to:  Does not radiate  Pain severity:  Severe  Onset quality:  Gradual  Duration:  4 days  Timing:  Intermittent  Progression:  Waxing and waning  Chronicity:  New  Relieved by:  Nothing  Worsened by:  Palpation and position changes  Ineffective treatments:  None tried  Associated symptoms: anorexia and nausea    Associated symptoms: no chest pain, no chills, no constipation, no cough, no diarrhea, no dysuria, no fever, no hematochezia, no melena, no shortness of breath, no sore throat and no vomiting    Risk factors comment:  Diabetes, states that history of similar pains no etiology has ever been found with this much worse prompting ED visit today      Prior to Admission Medications   Prescriptions Last Dose Informant Patient Reported? Taking?    ALPRAZolam (XANAX) 0 5 mg tablet   No Yes   Sig: Take 1 tablet by mouth 2 (two) times a day as needed for anxiety for up to 10 days   atorvastatin (LIPITOR) 20 mg tablet   Yes Yes   Sig: Take 20 mg by mouth daily   insulin lispro (HUMALOG KWIKPEN) 100 Units/mL SOPN   No Yes   Sig: Inject 15 Units under the skin 3 (three) times a day with meals   lisinopril (ZESTRIL) 10 mg tablet   Yes Yes   Sig: Take 10 mg by mouth daily   metFORMIN (GLUCOPHAGE-XR) 750 mg 24 hr tablet   Yes Yes   Sig: Take 750 mg by mouth daily with breakfast   pantoprazole (PROTONIX) 40 mg tablet   Yes Yes   Sig: Take 40 mg by mouth daily      Facility-Administered Medications: None       Past Medical History:   Diagnosis Date    Diabetes mellitus ([address] )     Hypertension        Past Surgical History:   Procedure Laterality Date    IN AMPUTATION FOOT,TRANSMETATARSAL Left 10/30/2017    Procedure: AMPUTATION TRANSMETATARSAL (TMA);  Surgeon: Ezequiel Aguirre DPM;  Location[facility];  Service: Podiatry    [address] Left 11/3/2017    Procedure: AMPUTATION TRANSMETATARSAL (TMA), left foot;  Surgeon: Ezequiel Aguirre DPM;  Location[facility];  Service: Podiatry       History reviewed  No pertinent family history  I have reviewed and agree with the history as documented  Social History   Substance Use Topics    Smoking status: Current Some Day Smoker     Types: Cigars    Smokeless tobacco: Never Used    Alcohol use Yes        Review of Systems   Constitutional: Negative for activity change, chills, diaphoresis and fever  HENT: Negative for congestion, sinus pressure and sore throat  Eyes: Negative for pain and visual disturbance  Respiratory: Negative for cough, chest tightness, shortness of breath, wheezing and stridor  Cardiovascular: Negative for chest pain and palpitations  Gastrointestinal: Positive for abdominal pain, anorexia and nausea  Negative for abdominal distention, constipation, diarrhea, hematochezia, melena and vomiting  Genitourinary: Negative for dysuria and frequency  Musculoskeletal: Negative for neck pain and neck stiffness  Skin: Negative for rash  Neurological: Negative for dizziness, speech difficulty, light-headedness, numbness and headaches  Physical Exam  ED Triage Vitals [05/03/18 1026]   Temperature Pulse Respirations Blood Pressure SpO2   97 6 °F (36 4 °C) 89 20 (!) 181/88 98 %      Temp Source Heart Rate Source Patient Position - Orthostatic VS BP Location FiO2 (%)   Oral Monitor Sitting Right arm --      Pain Score       9           Orthostatic Vital Signs  Vitals:    05/03/18 1026 05/03/18 1200 05/03/18 1230   BP: (!) 181/88 (!) 173/98 157/91   Pulse: 89 72 76   Patient Position - Orthostatic VS: Sitting Lying Lying       Physical Exam   Constitutional: He is oriented to person, place, and time  He appears well-developed  He appears distressed  HENT:   Head: Normocephalic and atraumatic  Eyes: Pupils are equal, round, and reactive to light  Neck: Normal range of motion  Neck supple  No tracheal deviation present  Cardiovascular: Normal rate, regular rhythm, normal heart sounds and intact distal pulses  No murmur heard  Pulmonary/Chest: Effort normal and breath sounds normal  No stridor  No respiratory distress  Patient tachypneic with respiratory rate in the low to mid 20s   Abdominal: Soft  He exhibits no distension  There is tenderness  There is no rebound and no guarding  Tender in epigastric left upper quadrant left lower quadrant  , soft no rebound no guarding  No CVA tenderness  No tenderness over McBurney's point negative Turner sign  Musculoskeletal: Normal range of motion  Neurological: He is alert and oriented to person, place, and time  Skin: Skin is warm and dry  He is not diaphoretic  No erythema  No pallor  Psychiatric: He has a normal mood and affect  Vitals reviewed        ED Medications  Medications   sodium chloride 0 9 % bolus 1,000 mL (1,000 mL Intravenous New Bag 5/3/18 1241)   sodium chloride 0 9 % bolus 1,000 mL (0 mL Intravenous Stopped 5/3/18 1239)   ondansetron (ZOFRAN) injection 4 mg (4 mg Intravenous Given 5/3/18 1044)   morphine injection 2 mg (2 mg Intravenous Given 5/3/18 1044)   iohexol (OMNIPAQUE) 350 MG/ML injection (MULTI-DOSE) 100 mL (100 mL Intravenous Given 5/3/18 1137)       Diagnostic Studies  Results Reviewed     Procedure Component Value Units Date/Time    Fingerstick Glucose (POCT) [[id number]]  (Abnormal) Collected:  05/03/18 1159    Lab Status:  Final result Updated:  05/03/18 1201     POC Glucose 198 (H) mg/dl     Lactic acid, plasma [[id number]]  (Abnormal) Collected:  05/03/18 1036    Lab Status:  Final result Specimen:  Blood from Arm, Right Updated:  05/03/18 1118     LACTIC ACID 2 6 (HH) mmol/L     Narrative:         Result may be elevated if tourniquet was used during collection  Protime-INR [[id number]]  (Abnormal) Collected:  05/03/18 1037    Lab Status:  Final result Specimen:  Blood from Arm, Right Updated:  05/03/18 1118     Protime 11 7 (L) seconds      INR 0 84 (L)    APTT [[id number]]  (Normal) Collected:  05/03/18 1037    Lab Status:  Final result Specimen:  Blood from Arm, Right Updated:  05/03/18 1118     PTT 25 seconds     Troponin I [[id number]]  (Normal) Collected:  05/03/18 1036    Lab Status:  Final result Specimen:  Blood from Arm, Right Updated:  05/03/18 1111     Troponin I <0 02 ng/mL     Narrative:         [employer] Chemistry analyzer 99% cutoff is > 0 04 ng/mL in network labs    o cTnI 99% cutoff is useful only when applied to patients in the clinical setting of myocardial ischemia  o cTnI 99% cutoff should be interpreted in the context of clinical history, ECG findings and possibly cardiac imaging to establish correct diagnosis  o cTnI 99% cutoff may be suggestive but clearly not indicative of a coronary event without the clinical setting of myocardial ischemia  Basic metabolic panel [[id number]]  (Abnormal) Collected:  05/03/18 1037    Lab Status:  Final result Specimen:  Blood from Arm, Right Updated:  05/03/18 1106     Sodium 134 (L) mmol/L      Potassium 3 6 mmol/L      Chloride 98 (L) mmol/L      CO2 24 mmol/L      Anion Gap 12 mmol/L      BUN 16 mg/dL      Creatinine 1 07 mg/dL      Glucose 218 (H) mg/dL      Calcium 8 6 mg/dL      eGFR 78 ml/min/1 73sq m     Narrative:         National Kidney Disease Education Program recommendations are as follows:  GFR calculation is accurate only with a steady state creatinine  Chronic Kidney disease less than 60 ml/min/1 73 sq  meters  Kidney failure less than 15 ml/min/1 73 sq  meters      Hepatic function panel [[id number]]  (Abnormal) Collected:  05/03/18 1037    Lab Status:  Final result Specimen:  Blood from Arm, Right Updated:  05/03/18 1106     Total Bilirubin 2 00 (H) mg/dL      Bilirubin, Direct 0 30 (H) mg/dL      Alkaline Phosphatase 63 U/L      AST 14 U/L      ALT 35 U/L      Total Protein 6 8 g/dL      Albumin 3 7 g/dL     Lipase [[id number]]  (Normal) Collected:  05/03/18 1037    Lab Status:  Final result Specimen:  Blood from Arm, Right Updated:  05/03/18 1106     Lipase 190 u/L     CBC and differential [[id number]]  (Abnormal) Collected:  05/03/18 1036    Lab Status:  Final result Specimen:  Blood from Arm, Right Updated:  05/03/18 1051     WBC 11 16 (H) Thousand/uL      RBC 5 80 (H) Million/uL      Hemoglobin 16 7 g/dL      Hematocrit 45 9 %      MCV 79 (L) fL      MCH 28 8 pg      MCHC 36 4 g/dL      RDW 13 0 %      MPV 9 0 fL      Platelets 024 Thousands/uL      Neutrophils Relative 73 %      Lymphocytes Relative 22 %      Monocytes Relative 5 %      Eosinophils Relative 0 %      Basophils Relative 0 %      Neutrophils Absolute 8 06 (H) Thousands/µL      Lymphocytes Absolute 2 48 Thousands/µL      Monocytes Absolute 0 54 Thousand/µL      Eosinophils Absolute 0 04 Thousand/µL      Basophils Absolute 0 04 Thousands/µL     Blood gas, venous [[id number]]  (Abnormal) Collected:  05/03/18 1036    Lab Status:  Final result Specimen:  Blood from Arm, Right Updated:  05/03/18 1050     pH, Eddy 7 555 (H)     pCO2, Eddy 25 3 (L) mm Hg      pO2, Eddy 27 0 (L) mm Hg      HCO3, Eddy 21 9 (L) mmol/L      Base Excess, Eddy 1 6 mmol/L      O2 Content, Eddy 15 8 ml/dL      O2 HGB, VENOUS 65 3 %                  CT abdomen pelvis with contrast   Final Result by Dayne Rock MD (05/03 1318)      No acute abnormality identified  Left adrenal nodule without suspicious change, likely represent adenoma  Diverticulosis without evidence of diverticulitis              Workstation performed: [id number]         XR chest 2 views   ED Interpretation by Jefferson Velazquez DO (05/03 4980)   No acute pathology                 Procedures  ECG 12 Lead Documentation  Date/Time: 5/3/2018 11:53 AM  Performed by: Marisabel Gong by: Anthony MUNROE     ECG reviewed by me, the ED Provider: yes    Patient location:  ED  Previous ECG:     Previous ECG:  Compared to current    Comparison ECG info:  10 5 2016    Similarity:  No change  Interpretation:     Interpretation: normal    Rate:     ECG rate:  77    ECG rate assessment: normal    Rhythm:     Rhythm: sinus rhythm    Ectopy:     Ectopy: none    QRS:     QRS axis:  Normal    QRS intervals:  Normal  Conduction:     Conduction: normal    ST segments:     ST segments:  Normal  T waves:     T waves: normal             Phone Contacts  ED Phone Contact    ED Course  ED Course as of May 03 1252   Thu May 03, 2018   1251 Patient with complete resolutionOf symptoms unclear etiology of his discomfort possibly a degree of got diabetic gastroparesis, will discharge    1251 Patient with respiratory alkalosis, his tachypnea earlier likely was a degree of hyperventilation pCO2, Eddy: (!) 25 3                               MDM  Number of Diagnoses or Management Options  Abdominal pain, acute, right upper quadrant: new and requires workup  Diagnosis management comments:       Initial ED assessment:  57-year-old male presents with abdominal pain, tachypneic and tender in the left side on examination   History of diabetes sugars have been elevated at home    Initial DDx includes but is not limited to:   Diverticulitis, colitis, pancreatitis, mesenteric ischemia, lower lobe pneumonia, DKA, AAA, aortic dissection felt to be less likely as pain does not radiate to the back is been on off and is tender in the abdomen , diabetic gastroparesis    Initial ED plan:   Blood work, IV pain controlling medications, chest x-ray, CT imaging, disposition pending ED workup        Final ED summary/disposition:   After evaluation and workup in the emergency department, no etiology found, patient to follow with PCP        Amount and/or Complexity of Data Reviewed  Clinical lab tests: ordered and reviewed  Tests in the radiology section of CPT®: ordered and reviewed  Decide to obtain previous medical records or to obtain history from someone other than the patient: yes  Obtain history from someone other than the patient: yes  Review and summarize past medical records: yes  Independent visualization of images, tracings, or specimens: yes      CritCare Time    Disposition  Final diagnoses:   Abdominal pain, acute, right upper quadrant     Time reflects when diagnosis was documented in both MDM as applicable and the Disposition within this note     Time User Action Codes Description Comment    5/3/2018 12:52 PM Keya Menjivar Add [R10 11] Abdominal pain, acute, right upper quadrant       ED Disposition     ED Disposition Condition Comment    Discharge  [facility] discharge to home/self care  Condition at discharge: Good        Follow-up Information     Follow up With Specialties Details Why [facility],  Family Medicine Call today To arrange for the next available appointment [address] [place], [place] [id number] [phone]          Patient's Medications   Discharge Prescriptions    No medications on file     No discharge procedures on file      ED Provider  Electronically Signed by           Ravin Lawrence DO  05/03/18 7638

## 2018-05-03 NOTE — DISCHARGE INSTRUCTIONS
Acute Abdominal Pain   AMBULATORY CARE:   Acute abdominal pain  usually starts suddenly and gets worse quickly  Seek care immediately if:   · You vomit blood or cannot stop vomiting  · You have blood in your bowel movement or it looks like tar  · You have bleeding from your rectum  · Your abdomen is larger than usual, more painful, and hard  · You have severe pain in your abdomen  · You stop passing gas and having bowel movements  · You feel weak, dizzy, or faint  Contact your healthcare provider if:   · You have a fever  · You have new signs and symptoms  · Your symptoms do not get better with treatment  · You have questions or concerns about your condition or care  Treatment for acute abdominal pain  may depend on the cause of your abdominal pain  You may need any of the following:  · Medicines  may be given to decrease pain, treat an infection, and manage your symptoms, such as constipation  · Surgery  may be needed to treat a serious cause of abdominal pain  Examples include surgery to treat appendicitis or a blockage in your bowels  Manage your symptoms:   · Apply heat  on your abdomen for 20 to 30 minutes every 2 hours for as many days as directed  Heat helps decrease pain and muscle spasms  · Manage your stress  Stress may cause abdominal pain  Your healthcare provider may recommend relaxation techniques and deep breathing exercises to help decrease your stress  Your healthcare provider may recommend you talk to someone about your stress or anxiety, such as a counselor or a trusted friend  Get plenty of sleep and exercise regularly  · Limit or do not drink alcohol  Alcohol can make your abdominal pain worse  Ask your healthcare provider if it is safe for you to drink alcohol  Also ask how much is safe for you to drink  · Do not smoke  Nicotine and other chemicals in cigarettes can damage your esophagus and stomach   Ask your healthcare provider for information if you currently smoke and need help to quit  E-cigarettes or smokeless tobacco still contain nicotine  Talk to your healthcare provider before you use these products  Make changes to the food you eat as directed:  Do not eat foods that cause abdominal pain or other symptoms  Eat small meals more often  · Eat more high-fiber foods if you are constipated  High-fiber foods include fruits, vegetables, whole-grain foods, and legumes  · Do not eat foods that cause gas if you have bloating  Examples include broccoli, cabbage, and cauliflower  Do not drink soda or carbonated drinks, because these may also cause gas  · Do not eat foods or drinks that contain sorbitol or fructose if you have diarrhea and bloating  Some examples are fruit juices, candy, jelly, and sugar-free gum  · Do not eat high-fat foods, such as fried foods, cheeseburgers, hot dogs, and desserts  · Limit or do not drink caffeine  Caffeine may make symptoms, such as heart burn or nausea, worse  · Drink plenty of liquids to prevent dehydration from diarrhea or vomiting  Ask your healthcare provider how much liquid to drink each day and which liquids are best for you  Follow up with your healthcare provider as directed:  Write down your questions so you remember to ask them during your visits  © 2017 [address] [employer] Information is for End User's use only and may not be sold, redistributed or otherwise used for commercial purposes  All illustrations and images included in CareNotes® are the copyrighted property of A D A M [employer]  or Jay Nelson  The above information is an  only  It is not intended as medical advice for individual conditions or treatments  Talk to your doctor, nurse or pharmacist before following any medical regimen to see if it is safe and effective for you

## 2018-05-03 NOTE — ED NOTES
PT awake and alert, no distress noted, no other questions upon d/c  PT wheeled into waiting room to wait for family to  and take home       April Kaylee May RN  05/03/18 [id number]

## 2018-05-05 ENCOUNTER — HOSPITAL ENCOUNTER (EMERGENCY)
Facility: HOSPITAL | Age: 53
Discharge: HOME | End: 2018-05-05
Attending: EMERGENCY MEDICINE | Admitting: EMERGENCY MEDICINE
Payer: COMMERCIAL

## 2018-05-05 VITALS
OXYGEN SATURATION: 96 % | BODY MASS INDEX: 31.72 KG/M2 | SYSTOLIC BLOOD PRESSURE: 161 MMHG | DIASTOLIC BLOOD PRESSURE: 93 MMHG | TEMPERATURE: 97.9 F | HEART RATE: 80 BPM | WEIGHT: 253.75 LBS | RESPIRATION RATE: 15 BRPM

## 2018-05-05 DIAGNOSIS — R11.2 NAUSEA AND VOMITING: Primary | ICD-10-CM

## 2018-05-05 LAB
ALBUMIN SERPL BCP-MCNC: 3.5 G/DL (ref 3.5–5)
ALP SERPL-CCNC: 57 U/L (ref 46–116)
ALT SERPL W P-5'-P-CCNC: 28 U/L (ref 12–78)
ANION GAP SERPL CALCULATED.3IONS-SCNC: 8 MMOL/L (ref 4–13)
AST SERPL W P-5'-P-CCNC: 9 U/L (ref 5–45)
BILIRUB SERPL-MCNC: 1.9 MG/DL (ref 0.2–1)
BUN SERPL-MCNC: 8 MG/DL (ref 5–25)
CALCIUM SERPL-MCNC: 8.5 MG/DL (ref 8.3–10.1)
CHLORIDE SERPL-SCNC: 102 MMOL/L (ref 100–108)
CO2 SERPL-SCNC: 28 MMOL/L (ref 21–32)
CREAT SERPL-MCNC: 0.91 MG/DL (ref 0.6–1.3)
ERYTHROCYTE [DISTWIDTH] IN BLOOD BY AUTOMATED COUNT: 12.8 % (ref 11.6–15.1)
GFR SERPL CREATININE-BSD FRML MDRD: 95 ML/MIN/1.73SQ M
GLUCOSE SERPL-MCNC: 147 MG/DL (ref 65–140)
HCT VFR BLD AUTO: 43.5 % (ref 36.5–49.3)
HGB BLD-MCNC: 15.8 G/DL (ref 12–17)
LACTATE SERPL-SCNC: 1.3 MMOL/L (ref 0.5–2)
LIPASE SERPL-CCNC: 153 U/L (ref 73–393)
MCH RBC QN AUTO: 28.7 PG (ref 26.8–34.3)
MCHC RBC AUTO-ENTMCNC: 36.3 G/DL (ref 31.4–37.4)
MCV RBC AUTO: 79 FL (ref 82–98)
PLATELET # BLD AUTO: 331 THOUSANDS/UL (ref 149–390)
PMV BLD AUTO: 9 FL (ref 8.9–12.7)
POTASSIUM SERPL-SCNC: 3.2 MMOL/L (ref 3.5–5.3)
PROT SERPL-MCNC: 6.3 G/DL (ref 6.4–8.2)
RBC # BLD AUTO: 5.5 MILLION/UL (ref 3.88–5.62)
SODIUM SERPL-SCNC: 138 MMOL/L (ref 136–145)
WBC # BLD AUTO: 11.88 THOUSAND/UL (ref 4.31–10.16)

## 2018-05-05 PROCEDURE — 36415 COLL VENOUS BLD VENIPUNCTURE: CPT | Performed by: PHYSICIAN ASSISTANT

## 2018-05-05 PROCEDURE — 83605 ASSAY OF LACTIC ACID: CPT | Performed by: PHYSICIAN ASSISTANT

## 2018-05-05 PROCEDURE — 99284 EMERGENCY DEPT VISIT MOD MDM: CPT

## 2018-05-05 PROCEDURE — 80053 COMPREHEN METABOLIC PANEL: CPT | Performed by: PHYSICIAN ASSISTANT

## 2018-05-05 PROCEDURE — 85027 COMPLETE CBC AUTOMATED: CPT | Performed by: PHYSICIAN ASSISTANT

## 2018-05-05 PROCEDURE — 96361 HYDRATE IV INFUSION ADD-ON: CPT

## 2018-05-05 PROCEDURE — 83690 ASSAY OF LIPASE: CPT | Performed by: PHYSICIAN ASSISTANT

## 2018-05-05 PROCEDURE — 96374 THER/PROPH/DIAG INJ IV PUSH: CPT

## 2018-05-05 RX ORDER — ONDANSETRON 4 MG/1
4 TABLET, ORALLY DISINTEGRATING ORAL EVERY 8 HOURS PRN
Qty: 9 TABLET | Refills: 0 | Status: SHIPPED | OUTPATIENT
Start: 2018-05-05 | End: 2018-05-08

## 2018-05-05 RX ORDER — ONDANSETRON 2 MG/ML
4 INJECTION INTRAMUSCULAR; INTRAVENOUS ONCE
Status: DISCONTINUED | OUTPATIENT
Start: 2018-05-05 | End: 2018-05-05

## 2018-05-05 RX ORDER — METOCLOPRAMIDE HYDROCHLORIDE 5 MG/ML
10 INJECTION INTRAMUSCULAR; INTRAVENOUS ONCE
Status: COMPLETED | OUTPATIENT
Start: 2018-05-05 | End: 2018-05-05

## 2018-05-05 RX ADMIN — SODIUM CHLORIDE 1000 ML: 0.9 INJECTION, SOLUTION INTRAVENOUS at 19:25

## 2018-05-05 RX ADMIN — METOCLOPRAMIDE 10 MG: 5 INJECTION, SOLUTION INTRAMUSCULAR; INTRAVENOUS at 19:36

## 2018-05-05 NOTE — ED ATTENDING ATTESTATION
I, Rin Kim DO, saw and evaluated the patient  I have discussed the patient with the resident/non-physician practitioner and agree with the resident's/non-physician practitioner's findings, Plan of Care, and MDM as documented in the resident's/non-physician practitioner's note, except where noted  All available labs and Radiology studies were reviewed  At this point I agree with the current assessment done in the Emergency Department  I have conducted an independent evaluation of this patient a history and physical is as follows:      Critical Care Time  CritCare Time    Procedures      12-year-old male, presents with upper abdominal pain and vomiting  , seen here yesterday by myself, at that time  Resource intense workup including blood work and CT imaging were performed, virtually unremarkable results  , continued vomiting and abdominal pain prompted repeat visit today  On examination abdomen is nontender with the exception of the upper quadrants on my examination  Blood work unremarkable  , history of gastroparesis likely having intermittent gastroparesis  Recommended soft and liquid diet over the next few days  Patient understands  Patient is able to eat crackers and water here in the emergency department offered observation admission for hydration and antiemetics patient respectfully declining    Plan will DC on [facility] outpatient follow-up

## 2018-05-05 NOTE — ED PROVIDER NOTES
History  Chief Complaint   Patient presents with    Abdominal Pain     epigastric pain, with nausea and vomiting, unable to keep anything down X one week, here on thursday for same, negative Cat Scan at that time     57-year-old male with medical history of diabetes mellitus the presents to the emergency department complaining of epigastric pain and nausea and vomiting for 6 days  States since Sunday, he has been able to keep anything, including water, down  Says vomit is nonbloody, symptoms bilious  Had a large bowel movement this morning, no blood or melena  Was seen here 2 days ago for the same symptoms, CT scan was unremarkable that time  Was symptomatically treated and felt better by the time he went home  Since that his symptoms have recurred  He says that he has had similar symptoms, about 6 episodes in the past 3 years  His family doctor has suggested that the symptoms are the result of gastroparesis  Says he was to have an EGD done but not because of insurance restrictions, and so that workup is at somewhat of a standstill  Differential diagnosis includes but is not limited to:  Gastritis, pancreatitis, cholecystitis, infectious colitis, diverticulitis, gastroparesis, AAA, ischemic colitis, ACS            Prior to Admission Medications   Prescriptions Last Dose Informant Patient Reported? Taking?    ALPRAZolam (XANAX) 0 5 mg tablet   No No   Sig: Take 1 tablet by mouth 2 (two) times a day as needed for anxiety for up to 10 days   atorvastatin (LIPITOR) 20 mg tablet 5/5/2018 at Unknown time  Yes Yes   Sig: Take 20 mg by mouth daily   insulin lispro (HUMALOG KWIKPEN) 100 Units/mL SOPN 5/4/2018 at Unknown time  No Yes   Sig: Inject 15 Units under the skin 3 (three) times a day with meals   lisinopril (ZESTRIL) 10 mg tablet 5/4/2018 at Unknown time  Yes Yes   Sig: Take 10 mg by mouth daily   metFORMIN (GLUCOPHAGE-XR) 750 mg 24 hr tablet 5/4/2018 at Unknown time  Yes Yes   Sig: Take 750 mg by mouth daily with breakfast   pantoprazole (PROTONIX) 40 mg tablet 5/4/2018 at Unknown time  Yes Yes   Sig: Take 40 mg by mouth daily      Facility-Administered Medications: None       Past Medical History:   Diagnosis Date    Diabetes mellitus ([facility] )     Hypertension        Past Surgical History:   Procedure Laterality Date    FL AMPUTATION FOOT,TRANSMETATARSAL Left 10/30/2017    Procedure: AMPUTATION TRANSMETATARSAL (TMA);  Surgeon: Ralph Downs DPM;  [facility];  Service: [address] [place] [address] Left 11/3/2017    Procedure: AMPUTATION TRANSMETATARSAL (TMA), left foot;  Surgeon: aRlph Downs DPM;  Location[facility];  Service: Podiatry       History reviewed  No pertinent family history  I have reviewed and agree with the history as documented  Social History   Substance Use Topics    Smoking status: Current Some Day Smoker     Types: Cigars    Smokeless tobacco: Never Used    Alcohol use No        Review of Systems   Constitutional: Positive for appetite change  Negative for chills and fever  HENT: Negative for congestion, nosebleeds, postnasal drip, rhinorrhea, sinus pain and sinus pressure  Respiratory: Negative for cough, chest tightness and shortness of breath  Cardiovascular: Negative for chest pain  Gastrointestinal: Positive for abdominal pain, nausea and vomiting  Genitourinary: Negative for dysuria, frequency and hematuria  Neurological: Negative for dizziness, light-headedness and headaches  All other systems reviewed and are negative        Physical Exam  ED Triage Vitals   Temperature Pulse Respirations Blood Pressure SpO2   05/05/18 1812 05/05/18 1811 05/05/18 1811 05/05/18 1811 05/05/18 1811   97 9 °F (36 6 °C) 90 18 (!) 176/94 97 %      Temp Source Heart Rate Source Patient Position - Orthostatic VS BP Location FiO2 (%)   05/05/18 1812 05/05/18 1811 05/05/18 1811 05/05/18 1811 --   Oral Monitor Sitting Right arm       Pain Score 05/05/18 1811       7           Orthostatic Vital Signs  Vitals:    05/05/18 1901 05/05/18 1945 05/05/18 2000 05/05/18 2030   BP: 154/88 (!) 175/97 155/87 161/93   Pulse: 81 78 86 80   Patient Position - Orthostatic VS: Lying Lying Lying Lying       Physical Exam   Constitutional: He is oriented to person, place, and time  He appears well-developed and well-nourished  No distress  HENT:   Head: Normocephalic and atraumatic  Right Ear: External ear normal    Left Ear: External ear normal    Mouth/Throat: Oropharynx is clear and moist    Eyes: EOM are normal  Pupils are equal, round, and reactive to light  Neck: Normal range of motion  Cardiovascular: Normal rate, regular rhythm and normal heart sounds  Exam reveals no gallop and no friction rub  No murmur heard  Pulmonary/Chest: Effort normal and breath sounds normal  No respiratory distress  He has no wheezes  He has no rales  Abdominal: Soft  Mild epigastric tenderness   Musculoskeletal: Normal range of motion  Neurological: He is alert and oriented to person, place, and time  Skin: Skin is warm and dry  He is not diaphoretic  ED Medications  Medications   sodium chloride 0 9 % bolus 1,000 mL (0 mL Intravenous Stopped 5/5/18 2044)   metoclopramide (REGLAN) injection 10 mg (10 mg Intravenous Given 5/5/18 1936)       Diagnostic Studies  Results Reviewed     Procedure Component Value Units Date/Time    Lactic acid, plasma [[id number]]  (Normal) Collected:  05/05/18 1923    Lab Status:  Final result Specimen:  Blood from Arm, Right Updated:  05/05/18 1956     LACTIC ACID 1 3 mmol/L     Narrative:         Result may be elevated if tourniquet was used during collection      Comprehensive metabolic panel [[id number]]  (Abnormal) Collected:  05/05/18 1923    Lab Status:  Final result Specimen:  Blood from Arm, Right Updated:  05/05/18 1953     Sodium 138 mmol/L      Potassium 3 2 (L) mmol/L      Chloride 102 mmol/L      CO2 28 mmol/L      Anion Gap 8 mmol/L      BUN 8 mg/dL      Creatinine 0 91 mg/dL      Glucose 147 (H) mg/dL      Calcium 8 5 mg/dL      AST 9 U/L      ALT 28 U/L      Alkaline Phosphatase 57 U/L      Total Protein 6 3 (L) g/dL      Albumin 3 5 g/dL      Total Bilirubin 1 90 (H) mg/dL      eGFR 95 ml/min/1 73sq m     Narrative:         National Kidney Disease Education Program recommendations are as follows:  GFR calculation is accurate only with a steady state creatinine  Chronic Kidney disease less than 60 ml/min/1 73 sq  meters  Kidney failure less than 15 ml/min/1 73 sq  meters  Lipase [[id number]]  (Normal) Collected:  05/05/18 1923    Lab Status:  Final result Specimen:  Blood from Arm, Right Updated:  05/05/18 1953     Lipase 153 u/L     CBC [[id number]]  (Abnormal) Collected:  05/05/18 1923    Lab Status:  Final result Specimen:  Blood from Arm, Right Updated:  05/05/18 1939     WBC 11 88 (H) Thousand/uL      RBC 5 50 Million/uL      Hemoglobin 15 8 g/dL      Hematocrit 43 5 %      MCV 79 (L) fL      MCH 28 7 pg      MCHC 36 3 g/dL      RDW 12 8 %      Platelets 359 Thousands/uL      MPV 9 0 fL                  No orders to display              Procedures  Procedures       Phone Contacts  ED Phone Contact    ED Course                               MDM  Number of Diagnoses or Management Options  Nausea and vomiting: established and worsening  Diagnosis management comments: 61-year-old male presenting with recurrent epigastric pain and nausea and vomiting  Was seen 2 days ago for same symptoms with a negative workup  Was feeling better after symptomatic treatment  He returns today because he is unable to keep water down  He was treated with IV fluids and Reglan  Labs here are unremarkable  Likely diagnosis at this point is gastroparesis based on previous bouts in the past   He was successful with a p o  trial with water and crackers here in the ER and is feeling much better    Suggested that when he is able to eat more, that he try small portions of bland foods  He has a follow up with his PCP on the 14th of this month  Three day script of p r n  Zofran supplied  He is instructed to return to the ER if his symptoms worsen or he is unable to tolerate p o  liquids  He understood and agreed with treatment plan and had no questions  Amount and/or Complexity of Data Reviewed  Clinical lab tests: ordered and reviewed  Tests in the radiology section of CPT®: ordered and reviewed  Tests in the medicine section of CPT®: ordered and reviewed      CritCare Time    Disposition  Final diagnoses:   Nausea and vomiting     Time reflects when diagnosis was documented in both MDM as applicable and the Disposition within this note     Time User Action Codes Description Comment    5/5/2018  8:32 PM Katerin, [address] [R11 2] Nausea and vomiting       ED Disposition     ED Disposition Condition Comment    Discharge  [facility] discharge to home/self care      Condition at discharge: Good        Follow-up Information     Follow up With Specialties Details Why Contact Info [address],  Family Medicine Schedule an appointment as soon as possible for a visit  [address]  [address] [facility] Emergency Medicine  If symptoms worsen [address] [facility]  [facility], [address], [place], [place], [address]        Discharge Medication List as of 5/5/2018  8:44 PM      START taking these medications    Details   ondansetron (ZOFRAN-ODT) 4 mg disintegrating tablet Take 1 tablet (4 mg total) by mouth every 8 (eight) hours as needed for nausea or vomiting for up to 3 days, Starting Sat 5/5/2018, Until Tue 5/8/2018, Print         CONTINUE these medications which have NOT CHANGED    Details   atorvastatin (LIPITOR) 20 mg tablet Take 20 mg by mouth daily, Historical Med      insulin lispro (HUMALOG KWIKPEN) 100 Units/mL SOPN Inject 15 Units under the skin 3 (three) times a day with meals, Starting Tue 11/7/2017, Print      lisinopril (ZESTRIL) 10 mg tablet Take 10 mg by mouth daily, Historical Med      metFORMIN (GLUCOPHAGE-XR) 750 mg 24 hr tablet Take 750 mg by mouth daily with breakfast, Historical Med      pantoprazole (PROTONIX) 40 mg tablet Take 40 mg by mouth daily, Historical Med      ALPRAZolam (XANAX) 0 5 mg tablet Take 1 tablet by mouth 2 (two) times a day as needed for anxiety for up to 10 days, Starting Tue 11/7/2017, Until u 5/3/2018, Print           No discharge procedures on file      ED Provider  Electronically Signed by           Celine Tiwari PA-C  05/05/18 6494

## 2018-05-06 NOTE — DISCHARGE INSTRUCTIONS
Gastroparesis   WHAT YOU NEED TO KNOW:   Gastroparesis is a condition that causes food to move more slowly than normal from the stomach to the intestines  Gastroparesis is not caused by blockage  Often, the cause may not be known  It may be caused by damage to a nerve that controls muscles used to move food to your small intestines  DISCHARGE INSTRUCTIONS:   You or someone else should call 911 if:   · Your heart is beating faster and you are breathing faster than usual     · You cannot be woken up  Seek care immediately if:   · You are confused or have trouble thinking clearly  · You are dizzy or very drowsy  Contact your healthcare provider if:   · You are urinating less than usual     · Your symptoms return or become worse  · The color of your urine is dark yellow  · You have questions or concerns about your condition or care  Medicines:   · Medicines may  be given to control your nausea and vomiting  You may  also receive medicines that help food move through your stomach at a more normal rate  · Take your medicine as directed  Contact your healthcare provider if you think your medicine is not helping or if you have side effects  Tell him or her if you are allergic to any medicine  Keep a list of the medicines, vitamins, and herbs you take  Include the amounts, and when and why you take them  Bring the list or the pill bottles to follow-up visits  Carry your medicine list with you in case of an emergency  Follow up with your healthcare provider as directed: You may need tests to check if treatment is working  You may need to see a dietitian for help with a nutrition plan  Write down your questions so you remember to ask them during your visits  Self-care: Your healthcare provider may suggest any of the following:  · Change your eating habits    Take small bites of food to make it easier for your body to digest  You may need to eat several small meals low in fiber and fat throughout the day  Ask your dietitian for help with planning meals  · Do not eat raw fruits, vegetables, or whole grains  These can cause you to have undigested food in your stomach  The undigested food can form a blockage that can become life-threatening  · Drink liquids as directed  Liquids will prevent dehydration caused by vomiting  Slowly drink small amounts of liquids at a time  Ask your healthcare provider how much liquid to drink each day, and which liquids are best for you  You may also need to drink an oral rehydration solution (ORS)  An ORS has the right amounts of sugar, salt, and minerals in water to replace body fluids  · Do not lie down for 2 hours after your meals  Walking and sitting after meals help with digestion  · Control your blood sugar levels if you have diabetes  High blood sugar levels may make your symptoms worse  Ask your healthcare provider how to control your blood sugar levels  © 2017 [address] Information is for End User's use only and may not be sold, redistributed or otherwise used for commercial purposes  All illustrations and images included in CareNotes® are the copyrighted property of [employer]  or [address]  The above information is an  only  It is not intended as medical advice for individual conditions or treatments  Talk to your doctor, nurse or pharmacist before following any medical regimen to see if it is safe and effective for you  Before Colonoscopy   AMBULATORY CARE:   Before a colonoscopy,  your colon needs to be cleaned out  This is called bowel prep or colon prep  You may need to start your bowel prep 1 to 2 days before your colonoscopy, or the night before  Your bowel movements should look yellow to light brown and be liquid  You should not have solids in your bowel movements  Contact your healthcare provider if:   · You cannot finish your bowel prep       · Your bowel movements are not clear after you finish your bowel prep  · You have a fever or are sick  Types of bowel prep:  You may need any of the following medicines to help clean out your colon:  · Polyethylene glycol  is a laxative medicine that helps you have soft and frequent bowel movements  This medicine may come in a large container of liquid  You may need to drink this medicine over 4 to 6 hours  Ask your healthcare provider how to take this medicine  · Bisacodyl  is a laxative medicine that will help make it easier for you to have a bowel movement  It may be given with polyethylene glycol as a pill or a suppository  · Magnesium citrate  is a laxative medicine that will help make it easier for you to have a bowel movement  It may be given alone or with other laxative medicines  Self-care during bowel prep:   · Plan your bowel prep  Start your bowel prep as directed by your healthcare provider  Give yourself plenty of time to drink the entire amount of medicine  You should expect to empty your bowels frequently  Be sure that you have access to a nearby bathroom  · Drink all of the medicine  The medicine is in the correct dose to clean out your bowels  Drink the medicine slowly if you start to feel nauseated  The taste of the medicine may make it difficult for you to drink  Ask your healthcare provider about sports drinks or juices that the medicine can be mixed with  · Wear warm clothing if needed  It is normal to get the chills while you drink the medicine  Warm clothing can help make you more comfortable  · Prevent soreness in your rectum  Your rectum may get sore because you are wiping so often  Use soft wipes and wipe gently  · Drink more liquids as directed  You may become dehydrated from having frequent bowel movements  Ask your healthcare provider how much liquid you need during your bowel prep  Clear liquid diet before a colonoscopy:   You may be on a clear liquid diet 1 to 2 days before your colonoscopy  Clear liquids are liquids that you can easily see through  Do not  drink clear liquids that are blue, red, or purple  Do not eat or drink solid foods, milk, milk products, or juice with pulp  The following are examples of clear liquids:  · Water     · Gelatin     · Clear soft drinks, such as ginger ale     · Clear juice without pulp, such as apple juice    · Broth     · Oral rehydration solution     · Sports drinks  Other ways to prepare for your colonoscopy:   · Arrange for someone to drive you home and stay with you after your procedure  · Your healthcare provider will tell you what medicines you should or should not take before your colonoscopy  Talk to your healthcare provider before you stop or change any of your medicines  The following are general guidelines:     ¨ Diabetic medicines may need to be stopped or their dose changed, on the day of your colonoscopy  ¨ Blood thinners may need to be stopped 7 to 14 days before a colonoscopy to prevent bleeding  ¨ You will need to stop taking medicines that contain aspirin or iron for 7 days before your colonoscopy  ¨ Heart and blood pressure medicines are usually taken on the day of the procedure with a sip of water  © 2017 [facility] Information is for End User's use only and may not be sold, redistributed or otherwise used for commercial purposes  All illustrations and images included in CareNotes® are the copyrighted property of [employer]  or [employer]  The above information is an  only  It is not intended as medical advice for individual conditions or treatments  Talk to your doctor, nurse or pharmacist before following any medical regimen to see if it is safe and effective for you

## 2018-05-08 LAB
ATRIAL RATE: 77 BPM
P AXIS: 37 DEGREES
PR INTERVAL: 156 MS
QRS AXIS: 22 DEGREES
QRSD INTERVAL: 96 MS
QT INTERVAL: 356 MS
QTC INTERVAL: 402 MS
T WAVE AXIS: 64 DEGREES
VENTRICULAR RATE: 77 BPM

## 2018-05-08 PROCEDURE — 93010 ELECTROCARDIOGRAM REPORT: CPT | Performed by: INTERNAL MEDICINE

## 2018-06-08 ENCOUNTER — TRANSCRIBE ORDERS (OUTPATIENT)
Dept: ADMINISTRATIVE | Facility: HOSPITAL | Age: 53
End: 2018-06-08

## 2018-06-08 DIAGNOSIS — M81.0 OSTEOPOROSIS, UNSPECIFIED OSTEOPOROSIS TYPE, UNSPECIFIED PATHOLOGICAL FRACTURE PRESENCE: Primary | ICD-10-CM

## 2018-06-15 ENCOUNTER — HOSPITAL ENCOUNTER (OUTPATIENT)
Dept: RADIOLOGY | Age: 53
Discharge: HOME | End: 2018-06-15
Payer: COMMERCIAL

## 2018-06-15 DIAGNOSIS — M81.0 OSTEOPOROSIS, UNSPECIFIED OSTEOPOROSIS TYPE, UNSPECIFIED PATHOLOGICAL FRACTURE PRESENCE: ICD-10-CM

## 2018-06-15 PROCEDURE — A9585 GADOBUTROL INJECTION: HCPCS | Performed by: FAMILY MEDICINE

## 2018-06-15 PROCEDURE — 73720 MRI LWR EXTREMITY W/O&W/DYE: CPT

## 2018-06-15 RX ADMIN — GADOBUTROL 10 ML: 604.72 INJECTION INTRAVENOUS at 11:10

## 2018-12-13 ENCOUNTER — HOSPITAL ENCOUNTER (EMERGENCY)
Facility: HOSPITAL | Age: 53
Discharge: HOME | End: 2018-12-13
Attending: EMERGENCY MEDICINE | Admitting: EMERGENCY MEDICINE
Payer: COMMERCIAL

## 2018-12-13 VITALS
DIASTOLIC BLOOD PRESSURE: 97 MMHG | SYSTOLIC BLOOD PRESSURE: 203 MMHG | TEMPERATURE: 97.7 F | RESPIRATION RATE: 22 BRPM | HEART RATE: 74 BPM | OXYGEN SATURATION: 99 %

## 2018-12-13 DIAGNOSIS — E11.43 DIABETIC GASTROPARESIS ASSOCIATED WITH TYPE 2 DIABETES MELLITUS (HCC): ICD-10-CM

## 2018-12-13 DIAGNOSIS — K31.84 DIABETIC GASTROPARESIS ASSOCIATED WITH TYPE 2 DIABETES MELLITUS (HCC): ICD-10-CM

## 2018-12-13 DIAGNOSIS — R10.13 ABDOMINAL PAIN, ACUTE, EPIGASTRIC: Primary | ICD-10-CM

## 2018-12-13 LAB
ALBUMIN SERPL BCP-MCNC: 3.9 G/DL (ref 3.5–5)
ALP SERPL-CCNC: 70 U/L (ref 46–116)
ALT SERPL W P-5'-P-CCNC: 30 U/L (ref 12–78)
ANION GAP SERPL CALCULATED.3IONS-SCNC: 12 MMOL/L (ref 4–13)
APTT PPP: 26 SECONDS (ref 26–38)
AST SERPL W P-5'-P-CCNC: 14 U/L (ref 5–45)
BASOPHILS # BLD AUTO: 0.06 THOUSANDS/ΜL (ref 0–0.1)
BASOPHILS NFR BLD AUTO: 1 % (ref 0–1)
BILIRUB DIRECT SERPL-MCNC: 0.14 MG/DL (ref 0–0.2)
BILIRUB SERPL-MCNC: 1.2 MG/DL (ref 0.2–1)
BUN SERPL-MCNC: 14 MG/DL (ref 5–25)
CALCIUM SERPL-MCNC: 9.4 MG/DL (ref 8.3–10.1)
CHLORIDE SERPL-SCNC: 100 MMOL/L (ref 100–108)
CO2 SERPL-SCNC: 25 MMOL/L (ref 21–32)
CREAT SERPL-MCNC: 1.09 MG/DL (ref 0.6–1.3)
EOSINOPHIL # BLD AUTO: 0.09 THOUSAND/ΜL (ref 0–0.61)
EOSINOPHIL NFR BLD AUTO: 1 % (ref 0–6)
ERYTHROCYTE [DISTWIDTH] IN BLOOD BY AUTOMATED COUNT: 12.2 % (ref 11.6–15.1)
GFR SERPL CREATININE-BSD FRML MDRD: 76 ML/MIN/1.73SQ M
GLUCOSE SERPL-MCNC: 225 MG/DL (ref 65–140)
HCT VFR BLD AUTO: 49 % (ref 36.5–49.3)
HGB BLD-MCNC: 17.2 G/DL (ref 12–17)
IMM GRANULOCYTES # BLD AUTO: 0.07 THOUSAND/UL (ref 0–0.2)
IMM GRANULOCYTES NFR BLD AUTO: 1 % (ref 0–2)
INR PPP: 0.86 (ref 0.86–1.17)
LIPASE SERPL-CCNC: 228 U/L (ref 73–393)
LYMPHOCYTES # BLD AUTO: 1.96 THOUSANDS/ΜL (ref 0.6–4.47)
LYMPHOCYTES NFR BLD AUTO: 16 % (ref 14–44)
MCH RBC QN AUTO: 28.3 PG (ref 26.8–34.3)
MCHC RBC AUTO-ENTMCNC: 35.1 G/DL (ref 31.4–37.4)
MCV RBC AUTO: 81 FL (ref 82–98)
MONOCYTES # BLD AUTO: 0.43 THOUSAND/ΜL (ref 0.17–1.22)
MONOCYTES NFR BLD AUTO: 4 % (ref 4–12)
NEUTROPHILS # BLD AUTO: 9.53 THOUSANDS/ΜL (ref 1.85–7.62)
NEUTS SEG NFR BLD AUTO: 77 % (ref 43–75)
NRBC BLD AUTO-RTO: 0 /100 WBCS
PLATELET # BLD AUTO: 360 THOUSANDS/UL (ref 149–390)
PMV BLD AUTO: 8.6 FL (ref 8.9–12.7)
POTASSIUM SERPL-SCNC: 4.1 MMOL/L (ref 3.5–5.3)
PROT SERPL-MCNC: 7.4 G/DL (ref 6.4–8.2)
PROTHROMBIN TIME: 11.5 SECONDS (ref 11.8–14.2)
RBC # BLD AUTO: 6.08 MILLION/UL (ref 3.88–5.62)
SODIUM SERPL-SCNC: 137 MMOL/L (ref 136–145)
WBC # BLD AUTO: 12.14 THOUSAND/UL (ref 4.31–10.16)

## 2018-12-13 PROCEDURE — 85610 PROTHROMBIN TIME: CPT | Performed by: EMERGENCY MEDICINE

## 2018-12-13 PROCEDURE — 80076 HEPATIC FUNCTION PANEL: CPT | Performed by: EMERGENCY MEDICINE

## 2018-12-13 PROCEDURE — 85730 THROMBOPLASTIN TIME PARTIAL: CPT | Performed by: EMERGENCY MEDICINE

## 2018-12-13 PROCEDURE — 83690 ASSAY OF LIPASE: CPT | Performed by: EMERGENCY MEDICINE

## 2018-12-13 PROCEDURE — 96372 THER/PROPH/DIAG INJ SC/IM: CPT

## 2018-12-13 PROCEDURE — 85025 COMPLETE CBC W/AUTO DIFF WBC: CPT | Performed by: EMERGENCY MEDICINE

## 2018-12-13 PROCEDURE — 96374 THER/PROPH/DIAG INJ IV PUSH: CPT

## 2018-12-13 PROCEDURE — 96375 TX/PRO/DX INJ NEW DRUG ADDON: CPT

## 2018-12-13 PROCEDURE — 99284 EMERGENCY DEPT VISIT MOD MDM: CPT

## 2018-12-13 PROCEDURE — 80048 BASIC METABOLIC PNL TOTAL CA: CPT | Performed by: EMERGENCY MEDICINE

## 2018-12-13 PROCEDURE — 96361 HYDRATE IV INFUSION ADD-ON: CPT

## 2018-12-13 PROCEDURE — 36415 COLL VENOUS BLD VENIPUNCTURE: CPT | Performed by: EMERGENCY MEDICINE

## 2018-12-13 RX ORDER — DIPHENHYDRAMINE HYDROCHLORIDE 50 MG/ML
25 INJECTION INTRAMUSCULAR; INTRAVENOUS ONCE
Status: COMPLETED | OUTPATIENT
Start: 2018-12-13 | End: 2018-12-13

## 2018-12-13 RX ORDER — ONDANSETRON 4 MG/1
4 TABLET, ORALLY DISINTEGRATING ORAL EVERY 8 HOURS PRN
Qty: 20 TABLET | Refills: 0 | Status: SHIPPED | OUTPATIENT
Start: 2018-12-13 | End: 2019-03-14

## 2018-12-13 RX ORDER — ONDANSETRON 2 MG/ML
4 INJECTION INTRAMUSCULAR; INTRAVENOUS ONCE
Status: COMPLETED | OUTPATIENT
Start: 2018-12-13 | End: 2018-12-13

## 2018-12-13 RX ORDER — MORPHINE SULFATE 4 MG/ML
4 INJECTION, SOLUTION INTRAMUSCULAR; INTRAVENOUS ONCE
Status: COMPLETED | OUTPATIENT
Start: 2018-12-13 | End: 2018-12-13

## 2018-12-13 RX ORDER — PROMETHAZINE HYDROCHLORIDE 25 MG/ML
25 INJECTION, SOLUTION INTRAMUSCULAR; INTRAVENOUS ONCE
Status: COMPLETED | OUTPATIENT
Start: 2018-12-13 | End: 2018-12-13

## 2018-12-13 RX ADMIN — PROMETHAZINE HYDROCHLORIDE 25 MG: 25 INJECTION INTRAMUSCULAR; INTRAVENOUS at 11:14

## 2018-12-13 RX ADMIN — SODIUM CHLORIDE 1000 ML: 0.9 INJECTION, SOLUTION INTRAVENOUS at 11:14

## 2018-12-13 RX ADMIN — ONDANSETRON 4 MG: 2 INJECTION INTRAMUSCULAR; INTRAVENOUS at 11:11

## 2018-12-13 RX ADMIN — DIPHENHYDRAMINE HYDROCHLORIDE 25 MG: 50 INJECTION, SOLUTION INTRAMUSCULAR; INTRAVENOUS at 11:12

## 2018-12-13 RX ADMIN — MORPHINE SULFATE 4 MG: 4 INJECTION INTRAVENOUS at 11:18

## 2018-12-13 NOTE — DISCHARGE INSTRUCTIONS
Diabetic Gastroparesis   WHAT YOU NEED TO KNOW:   Diabetic gastroparesis is a type of nerve damage that slows digestion  High blood sugar levels from diabetes can damage nerves and tissues in your stomach  The damage prevents your stomach from emptying normally  Gastroparesis is also called delayed gastric emptying  DISCHARGE INSTRUCTIONS:   Seek care immediately if:   · You are vomiting more severely or for a longer period than usual      · You urinate less than usual, and your mouth is dry  · You feel dizzy and weak, or you have fainted  · You have severe pain in your stomach or abdomen  Contact your healthcare provider if:   · Your blood sugar level is higher or lower than healthcare providers have told you it should be  · You continue to have pain and bloating in your abdomen  · You continue to have nausea and vomiting, or you are not able to eat  · You have questions or concerns about your condition or care  Medicines: You may need any of the following:  · Nausea medicine  helps calm your stomach and prevent vomiting  · Motility medicines  help your stomach muscles move food and liquids out of your stomach faster  These medicines also may help you digest food better  · Take your medicine as directed  Contact your healthcare provider if you think your medicine is not helping or if you have side effects  Tell him or her if you are allergic to any medicine  Keep a list of the medicines, vitamins, and herbs you take  Include the amounts, and when and why you take them  Bring the list or the pill bottles to follow-up visits  Carry your medicine list with you in case of an emergency  Manage your symptoms:   · Walk after you eat  This may help speed digestion  · Follow the meal plan  that your healthcare or dietitian gave you  This meal plan can help decrease your symptoms  The following may also help you manage your symptoms:     ¨ Eat less fat and fiber    High-fat and high-fiber foods may be hard for your stomach to digest  You may need to avoid fruits and vegetables such as oranges and broccoli  ¨ Eat 4 to 6 small meals a day  Smaller, more frequent meals are easier for your stomach to handle  ¨ Drink more liquids with your meals  Your healthcare provider may recommend liquid meals, such as soup  Liquid is easier to digest than solid food  ¨ Ask if you should prepare your food in a   Blended foods are easier to digest  Ask for directions on which foods to use and how to blend the food correctly  ¨ Ask about vitamins  you may need and how to add them to your meals  · Do not smoke  Nicotine can damage blood vessels, slow your digestion, and make it more difficult to manage your diabetes  Do not use e-cigarettes or smokeless tobacco in place of cigarettes or to help you quit  They still contain nicotine  Ask your healthcare provider for information if you currently smoke and need help quitting  · Do not drink alcohol  Alcohol may slow your digestion more  · Follow your diabetes treatment plan  You may need to check your blood sugar more often  High blood sugar levels slow digestion and can make your symptoms worse  Follow up with your healthcare provider as directed:  Write down your questions so you remember to ask them during your visits  © 2017 [address] Information is for End User's use only and may not be sold, redistributed or otherwise used for commercial purposes  All illustrations and images included in CareNotes® are the copyrighted property of [employer]  or [employer]  The above information is an  only  It is not intended as medical advice for individual conditions or treatments  Talk to your doctor, nurse or pharmacist before following any medical regimen to see if it is safe and effective for you

## 2018-12-13 NOTE — ED PROVIDER NOTES
History  Chief Complaint   Patient presents with    Abdominal Pain     pt presents ambulatory with c/o intermittent epigastric pain x 2 weeks, intermittent vomiting  hx gastroparesis        History provided by:  Patient  Abdominal Pain   Pain location:  Epigastric  Pain quality: aching    Pain radiates to:  Does not radiate  Pain severity:  Moderate  Onset quality:  Gradual  Duration:  3 days  Timing:  Intermittent  Progression:  Waxing and waning  Chronicity:  New  Context comment:  History of gastroparesis, over the past couple of days started increasing his diet, now with intractable vomiting  Relieved by:  Nothing  Worsened by:  Eating  Associated symptoms: nausea and vomiting    Associated symptoms: no chest pain, no chills, no constipation, no cough, no diarrhea, no dysuria, no fever, no shortness of breath and no sore throat        Prior to Admission Medications   Prescriptions Last Dose Informant Patient Reported? Taking?    ALPRAZolam (XANAX) 0 5 mg tablet   No No   Sig: Take 1 tablet by mouth 2 (two) times a day as needed for anxiety for up to 10 days   atorvastatin (LIPITOR) 20 mg tablet   Yes No   Sig: Take 20 mg by mouth daily   insulin lispro (HUMALOG KWIKPEN) 100 Units/mL SOPN   No No   Sig: Inject 15 Units under the skin 3 (three) times a day with meals   lisinopril (ZESTRIL) 10 mg tablet   Yes No   Sig: Take 10 mg by mouth daily   metFORMIN (GLUCOPHAGE-XR) 750 mg 24 hr tablet   Yes No   Sig: Take 750 mg by mouth daily with breakfast   ondansetron (ZOFRAN-ODT) 4 mg disintegrating tablet   No No   Sig: Take 1 tablet (4 mg total) by mouth every 8 (eight) hours as needed for nausea or vomiting for up to 3 days   pantoprazole (PROTONIX) 40 mg tablet   Yes No   Sig: Take 40 mg by mouth daily      Facility-Administered Medications: None       Past Medical History:   Diagnosis Date    Diabetes mellitus ([address] )     Hypertension     Neuropathy        Past Surgical History:   Procedure Laterality Date    [place] AMPUTATION FOOT,TRANSMETATARSAL Left 10/30/2017    Procedure: AMPUTATION TRANSMETATARSAL (TMA);  Surgeon: Alan Villaseñor DPM;  [facility];  Service: Podiatry    [address] [place] [address] Left 11/3/2017    Procedure: AMPUTATION TRANSMETATARSAL (TMA), left foot;  Surgeon: Alan Villaseñor DPM;  [facility];  Service: Podiatry       History reviewed  No pertinent family history  I have reviewed and agree with the history as documented  Social History   Substance Use Topics    Smoking status: Current Some Day Smoker     Types: Cigars    Smokeless tobacco: Never Used    Alcohol use No        Review of Systems   Constitutional: Negative for activity change, chills, diaphoresis and fever  HENT: Negative for congestion, sinus pressure and sore throat  Eyes: Negative for pain and visual disturbance  Respiratory: Negative for cough, chest tightness, shortness of breath, wheezing and stridor  Cardiovascular: Negative for chest pain and palpitations  Gastrointestinal: Positive for abdominal pain, nausea and vomiting  Negative for abdominal distention, constipation and diarrhea  Genitourinary: Negative for dysuria and frequency  Musculoskeletal: Negative for neck pain and neck stiffness  Skin: Negative for rash  Neurological: Negative for dizziness, speech difficulty, light-headedness, numbness and headaches  Physical Exam  Physical Exam   Constitutional: He is oriented to person, place, and time  He appears well-developed  No distress  HENT:   Head: Normocephalic and atraumatic  Eyes: Pupils are equal, round, and reactive to light  Neck: Normal range of motion  Neck supple  No tracheal deviation present  Cardiovascular: Normal rate, regular rhythm, normal heart sounds and intact distal pulses  No murmur heard  Pulmonary/Chest: Effort normal and breath sounds normal  No stridor  No respiratory distress  Abdominal: Soft   He exhibits no distension  There is no tenderness  There is no rebound and no guarding  Musculoskeletal: Normal range of motion  Neurological: He is alert and oriented to person, place, and time  Skin: Skin is warm and dry  He is not diaphoretic  No erythema  No pallor  Psychiatric: He has a normal mood and affect  Vitals reviewed  Vital Signs  ED Triage Vitals [12/13/18 1042]   Temperature Pulse Respirations Blood Pressure SpO2   97 7 °F (36 5 °C) 74 20 (!) 203/97 99 %      Temp Source Heart Rate Source Patient Position - Orthostatic VS BP Location FiO2 (%)   Oral Monitor Lying Right arm --      Pain Score       8           Vitals:    12/13/18 1042   BP: (!) 203/97   Pulse: 74   Patient Position - Orthostatic VS: Lying       Visual Acuity      ED Medications  Medications   ondansetron (ZOFRAN) injection 4 mg (4 mg Intravenous Given 12/13/18 1111)   sodium chloride 0 9 % bolus 1,000 mL (0 mL Intravenous Stopped 12/13/18 1214)   diphenhydrAMINE (BENADRYL) injection 25 mg (25 mg Intravenous Given 12/13/18 1112)   promethazine (PHENERGAN) injection 25 mg (25 mg Intramuscular Given 12/13/18 1114)   morphine (PF) 4 mg/mL injection 4 mg (4 mg Intravenous Given 12/13/18 1118)       Diagnostic Studies  Results Reviewed     Procedure Component Value Units Date/Time    Basic metabolic panel [[id number]]  (Abnormal) Collected:  12/13/18 1116    Lab Status:  Final result Specimen:  Blood from Arm, Left Updated:  12/13/18 1143     Sodium 137 mmol/L      Potassium 4 1 mmol/L      Chloride 100 mmol/L      CO2 25 mmol/L      ANION GAP 12 mmol/L      BUN 14 mg/dL      Creatinine 1 09 mg/dL      Glucose 225 (H) mg/dL      Calcium 9 4 mg/dL      eGFR 76 ml/min/1 73sq m     Narrative:         National Kidney Disease Education Program recommendations are as follows:  GFR calculation is accurate only with a steady state creatinine  Chronic Kidney disease less than 60 ml/min/1 73 sq  meters  Kidney failure less than 15 ml/min/1 73 sq  meters      Hepatic function panel [[id number]]  (Abnormal) Collected:  12/13/18 1116    Lab Status:  Final result Specimen:  Blood from Arm, Left Updated:  12/13/18 1143     Total Bilirubin 1 20 (H) mg/dL      Bilirubin, Direct 0 14 mg/dL      Alkaline Phosphatase 70 U/L      AST 14 U/L      ALT 30 U/L      Total Protein 7 4 g/dL      Albumin 3 9 g/dL     Lipase [[id number]]  (Normal) Collected:  12/13/18 1116    Lab Status:  Final result Specimen:  Blood from Arm, Left Updated:  12/13/18 1143     Lipase 228 u/L     Protime-INR [[id number]]  (Abnormal) Collected:  12/13/18 1116    Lab Status:  Final result Specimen:  Blood from Arm, Left Updated:  12/13/18 1143     Protime 11 5 (L) seconds      INR 0 86    APTT [[id number]]  (Normal) Collected:  12/13/18 1116    Lab Status:  Final result Specimen:  Blood from Arm, Left Updated:  12/13/18 1143     PTT 26 seconds     CBC and differential [[id number]]  (Abnormal) Collected:  12/13/18 1116    Lab Status:  Final result Specimen:  Blood from Arm, Left Updated:  12/13/18 1128     WBC 12 14 (H) Thousand/uL      RBC 6 08 (H) Million/uL      Hemoglobin 17 2 (H) g/dL      Hematocrit 49 0 %      MCV 81 (L) fL      MCH 28 3 pg      MCHC 35 1 g/dL      RDW 12 2 %      MPV 8 6 (L) fL      Platelets 020 Thousands/uL      nRBC 0 /100 WBCs      Neutrophils Relative 77 (H) %      Immat GRANS % 1 %      Lymphocytes Relative 16 %      Monocytes Relative 4 %      Eosinophils Relative 1 %      Basophils Relative 1 %      Neutrophils Absolute 9 53 (H) Thousands/µL      Immature Grans Absolute 0 07 Thousand/uL      Lymphocytes Absolute 1 96 Thousands/µL      Monocytes Absolute 0 43 Thousand/µL      Eosinophils Absolute 0 09 Thousand/µL      Basophils Absolute 0 06 Thousands/µL                  No orders to display              Procedures  Procedures       Phone Contacts  ED Phone Contact    ED Course                               MDM  Number of Diagnoses or Management Options  Abdominal pain, acute, epigastric: new and requires workup  Diabetic gastroparesis associated with type 2 diabetes mellitus ([facility] [address] ): new and requires workup  Diagnosis management comments: History of diabetic gastroparesis nontender abdomen, exam consistent with diabetic gastroparesis given fluids given antiemetics complete resolution will discharge       Amount and/or Complexity of Data Reviewed  Clinical lab tests: ordered and reviewed  Decide to obtain previous medical records or to obtain history from someone other than the patient: yes  Obtain history from someone other than the patient: yes  Review and summarize past medical records: yes      CritCare Time    Disposition  Final diagnoses:   Abdominal pain, acute, epigastric   Diabetic gastroparesis associated with type 2 diabetes mellitus ([facility] [address] )     Time reflects when diagnosis was documented in both MDM as applicable and the Disposition within this note     Time User Action Codes Description Comment    12/13/2018 12:06 PM Eden Sinclair Add [R10 13] Abdominal pain, acute, epigastric     12/13/2018 12:06 PM Eden Sinclair Add [E11 43,  K31 84] Diabetic gastroparesis associated with type 2 diabetes mellitus [facility])       ED Disposition     ED Disposition Condition Comment    Discharge  [facility] discharge to home/self care  Condition at discharge: Good        Follow-up Information     Follow up With Specialties Details Why Contact Info    Fabi Palomo MD Gastroenterology Call today To arrange for the next available appointment [facility] [address]  [phone]            Discharge Medication List as of 12/13/2018 12:07 PM      CONTINUE these medications which have CHANGED    Details   ondansetron (ZOFRAN-ODT) 4 mg disintegrating tablet Take 1 tablet (4 mg total) by mouth every 8 (eight) hours as needed for nausea or vomiting for up to 15 doses, Starting Thu 12/13/2018, Print         CONTINUE these medications which have NOT CHANGED    Details ALPRAZolam (XANAX) 0 5 mg tablet Take 1 tablet by mouth 2 (two) times a day as needed for anxiety for up to 10 days, Starting Tue 11/7/2017, Until Thu 5/3/2018, Print      atorvastatin (LIPITOR) 20 mg tablet Take 20 mg by mouth daily, Historical Med      insulin lispro (HUMALOG KWIKPEN) 100 Units/mL SOPN Inject 15 Units under the skin 3 (three) times a day with meals, Starting Tue 11/7/2017, Print      lisinopril (ZESTRIL) 10 mg tablet Take 10 mg by mouth daily, Historical Med      metFORMIN (GLUCOPHAGE-XR) 750 mg 24 hr tablet Take 750 mg by mouth daily with breakfast, Historical Med      pantoprazole (PROTONIX) 40 mg tablet Take 40 mg by mouth daily, Historical Med           No discharge procedures on file      ED Provider  Electronically Signed by           Amalia Perez DO  12/13/18 9553

## 2018-12-22 NOTE — ANESTHESIA PREPROCEDURE EVALUATION
Review of Systems/Medical History  Patient summary reviewed        Cardiovascular  Hypertension ,    Pulmonary  Smoker cigarette smoker , Tobacco cessation counseling given, ,        GI/Hepatic  Negative GI/hepatic ROS          Negative  ROS        Endo/Other  Diabetes poorly controlled ,      GYN  Negative gynecology ROS          Hematology  Negative hematology ROS      Musculoskeletal  Negative musculoskeletal ROS        Neurology    Neuromuscular disease ,    Psychology   Negative psychology ROS            Physical Exam    Airway    Mallampati score: II  TM Distance: >3 FB  Neck ROM: full     Dental       Cardiovascular  Cardiovascular exam normal    Pulmonary  Pulmonary exam normal     Other Findings        Anesthesia Plan  ASA Score- 4 Emergent      Anesthesia Type- IV sedation with anesthesia with ASA Monitors  Additional Monitors:   Airway Plan:           Induction- intravenous  Informed Consent- Anesthetic plan and risks discussed with patient  · Cont risperdal, aricept at home dosing  · Supportive care

## 2019-03-14 ENCOUNTER — HOSPITAL ENCOUNTER (EMERGENCY)
Facility: HOSPITAL | Age: 54
Discharge: HOME | End: 2019-03-14
Attending: EMERGENCY MEDICINE | Admitting: EMERGENCY MEDICINE

## 2019-03-14 VITALS
SYSTOLIC BLOOD PRESSURE: 187 MMHG | RESPIRATION RATE: 16 BRPM | TEMPERATURE: 97.9 F | DIASTOLIC BLOOD PRESSURE: 79 MMHG | OXYGEN SATURATION: 98 % | HEART RATE: 84 BPM

## 2019-03-14 DIAGNOSIS — R73.9 HYPERGLYCEMIA: ICD-10-CM

## 2019-03-14 DIAGNOSIS — R17 SERUM TOTAL BILIRUBIN ELEVATED: ICD-10-CM

## 2019-03-14 DIAGNOSIS — R10.10 UPPER ABDOMINAL PAIN: Primary | ICD-10-CM

## 2019-03-14 DIAGNOSIS — R11.2 NAUSEA AND VOMITING, INTRACTABILITY OF VOMITING NOT SPECIFIED, UNSPECIFIED VOMITING TYPE: ICD-10-CM

## 2019-03-14 LAB
ALBUMIN SERPL BCP-MCNC: 3.8 G/DL (ref 3.5–5)
ALP SERPL-CCNC: 69 U/L (ref 46–116)
ALT SERPL W P-5'-P-CCNC: 31 U/L (ref 12–78)
ANION GAP SERPL CALCULATED.3IONS-SCNC: 11 MMOL/L (ref 4–13)
AST SERPL W P-5'-P-CCNC: 11 U/L (ref 5–45)
ATRIAL RATE: 83 BPM
BASOPHILS # BLD AUTO: 0.06 THOUSANDS/ΜL (ref 0–0.1)
BASOPHILS NFR BLD AUTO: 1 % (ref 0–1)
BILIRUB SERPL-MCNC: 3.2 MG/DL (ref 0.2–1)
BUN SERPL-MCNC: 19 MG/DL (ref 5–25)
CALCIUM SERPL-MCNC: 8.9 MG/DL (ref 8.3–10.1)
CHLORIDE SERPL-SCNC: 97 MMOL/L (ref 100–108)
CO2 SERPL-SCNC: 25 MMOL/L (ref 21–32)
CREAT SERPL-MCNC: 1.18 MG/DL (ref 0.6–1.3)
EOSINOPHIL # BLD AUTO: 0.03 THOUSAND/ΜL (ref 0–0.61)
EOSINOPHIL NFR BLD AUTO: 0 % (ref 0–6)
ERYTHROCYTE [DISTWIDTH] IN BLOOD BY AUTOMATED COUNT: 12.6 % (ref 11.6–15.1)
GFR SERPL CREATININE-BSD FRML MDRD: 69 ML/MIN/1.73SQ M
GLUCOSE SERPL-MCNC: 233 MG/DL (ref 65–140)
HCT VFR BLD AUTO: 52.1 % (ref 36.5–49.3)
HGB BLD-MCNC: 18.3 G/DL (ref 12–17)
IMM GRANULOCYTES # BLD AUTO: 0.08 THOUSAND/UL (ref 0–0.2)
IMM GRANULOCYTES NFR BLD AUTO: 1 % (ref 0–2)
LIPASE SERPL-CCNC: 334 U/L (ref 73–393)
LYMPHOCYTES # BLD AUTO: 2.27 THOUSANDS/ΜL (ref 0.6–4.47)
LYMPHOCYTES NFR BLD AUTO: 17 % (ref 14–44)
MCH RBC QN AUTO: 28.3 PG (ref 26.8–34.3)
MCHC RBC AUTO-ENTMCNC: 35.1 G/DL (ref 31.4–37.4)
MCV RBC AUTO: 81 FL (ref 82–98)
MONOCYTES # BLD AUTO: 0.65 THOUSAND/ΜL (ref 0.17–1.22)
MONOCYTES NFR BLD AUTO: 5 % (ref 4–12)
NEUTROPHILS # BLD AUTO: 10.14 THOUSANDS/ΜL (ref 1.85–7.62)
NEUTS SEG NFR BLD AUTO: 76 % (ref 43–75)
NRBC BLD AUTO-RTO: 0 /100 WBCS
P AXIS: 47 DEGREES
PLATELET # BLD AUTO: 334 THOUSANDS/UL (ref 149–390)
PMV BLD AUTO: 8.9 FL (ref 8.9–12.7)
POTASSIUM SERPL-SCNC: 3.9 MMOL/L (ref 3.5–5.3)
PR INTERVAL: 146 MS
PROT SERPL-MCNC: 6.9 G/DL (ref 6.4–8.2)
QRS AXIS: 41 DEGREES
QRSD INTERVAL: 94 MS
QT INTERVAL: 360 MS
QTC INTERVAL: 423 MS
RBC # BLD AUTO: 6.46 MILLION/UL (ref 3.88–5.62)
SODIUM SERPL-SCNC: 133 MMOL/L (ref 136–145)
T WAVE AXIS: 46 DEGREES
VENTRICULAR RATE: 83 BPM
WBC # BLD AUTO: 13.23 THOUSAND/UL (ref 4.31–10.16)

## 2019-03-14 PROCEDURE — 93005 ELECTROCARDIOGRAM TRACING: CPT

## 2019-03-14 PROCEDURE — 85025 COMPLETE CBC W/AUTO DIFF WBC: CPT | Performed by: EMERGENCY MEDICINE

## 2019-03-14 PROCEDURE — 80053 COMPREHEN METABOLIC PANEL: CPT | Performed by: EMERGENCY MEDICINE

## 2019-03-14 PROCEDURE — 96361 HYDRATE IV INFUSION ADD-ON: CPT

## 2019-03-14 PROCEDURE — 96372 THER/PROPH/DIAG INJ SC/IM: CPT

## 2019-03-14 PROCEDURE — 96360 HYDRATION IV INFUSION INIT: CPT

## 2019-03-14 PROCEDURE — 93010 ELECTROCARDIOGRAM REPORT: CPT | Performed by: INTERNAL MEDICINE

## 2019-03-14 PROCEDURE — 36415 COLL VENOUS BLD VENIPUNCTURE: CPT

## 2019-03-14 PROCEDURE — 83690 ASSAY OF LIPASE: CPT | Performed by: EMERGENCY MEDICINE

## 2019-03-14 PROCEDURE — 99284 EMERGENCY DEPT VISIT MOD MDM: CPT

## 2019-03-14 RX ORDER — METOCLOPRAMIDE 10 MG/1
10 TABLET ORAL 3 TIMES DAILY PRN
Qty: 30 TABLET | Refills: 12 | Status: SHIPPED | OUTPATIENT
Start: 2019-03-14 | End: 2022-07-02

## 2019-03-14 RX ORDER — LISINOPRIL 10 MG/1
10 TABLET ORAL ONCE
Status: COMPLETED | OUTPATIENT
Start: 2019-03-14 | End: 2019-03-14

## 2019-03-14 RX ORDER — SODIUM CHLORIDE, SODIUM LACTATE, POTASSIUM CHLORIDE, CALCIUM CHLORIDE 600; 310; 30; 20 MG/100ML; MG/100ML; MG/100ML; MG/100ML
1000 INJECTION, SOLUTION INTRAVENOUS CONTINUOUS
Status: DISCONTINUED | OUTPATIENT
Start: 2019-03-14 | End: 2019-03-14 | Stop reason: HOSPADM

## 2019-03-14 RX ORDER — ONDANSETRON 2 MG/ML
INJECTION INTRAMUSCULAR; INTRAVENOUS
Status: COMPLETED
Start: 2019-03-14 | End: 2019-03-14

## 2019-03-14 RX ORDER — HALOPERIDOL 5 MG/ML
5 INJECTION INTRAMUSCULAR ONCE
Status: COMPLETED | OUTPATIENT
Start: 2019-03-14 | End: 2019-03-14

## 2019-03-14 RX ADMIN — HALOPERIDOL LACTATE 5 MG: 5 INJECTION, SOLUTION INTRAMUSCULAR at 11:18

## 2019-03-14 RX ADMIN — SODIUM CHLORIDE, SODIUM LACTATE, POTASSIUM CHLORIDE, AND CALCIUM CHLORIDE 1000 ML: .6; .31; .03; .02 INJECTION, SOLUTION INTRAVENOUS at 11:18

## 2019-03-14 RX ADMIN — METFORMIN HYDROCHLORIDE 850 MG: 850 TABLET, FILM COATED ORAL at 14:04

## 2019-03-14 RX ADMIN — ONDANSETRON 4 MG: 2 INJECTION INTRAMUSCULAR; INTRAVENOUS at 10:44

## 2019-03-14 RX ADMIN — LISINOPRIL 10 MG: 10 TABLET ORAL at 14:04

## 2019-03-14 NOTE — ED NOTES
Discharge instructions reviewed with pt  Pt states he has no questions at this time       Agustina Jurado RN  03/14/19 7852

## 2019-03-14 NOTE — DISCHARGE INSTRUCTIONS
Diagnosis: upper abdominal pain with nausea/ vomiting- possible gastroparesis// elevated bilirubin- not new    -  please take all of your medication as before -- please take your insulin  to keep your blood sugar under control     - start your diet out with frequent  liquids- and small meals  advancing as tolerated     - for nausea/ vomiting- reglan 1 tablet 3 times a day -- can cause facial movement problems in 20 % of people- if this happens please take reglan with 25 mg of over the counter benadryl     - please return to  the er for any  return of intractable nausea/ vomiting/ upper abdominal pain / any bloody coffee ground vomiting/ any bloody /black tarry stools or any new/ worsening/concerning symptoms to you

## 2019-03-14 NOTE — ED PROCEDURE NOTE
PROCEDURE  ECG 12 Lead Documentation  Date/Time: 3/14/2019 3:12 PM  Performed by: Deja De La O MD  Authorized by: Deja De La O MD     Indications / Diagnosis:  Upper ab d pain   ECG reviewed by me, the ED Provider: yes    Patient location:  ED and bedside  Previous ECG:     Previous ECG:  Compared to current    Comparison ECG info:  5/5/18    Similarity:  No change    Comparison to cardiac monitor: Yes    Interpretation:     Interpretation: non-specific    Rate:     ECG rate:  83    ECG rate assessment: normal    Rhythm:     Rhythm: sinus rhythm    Ectopy:     Ectopy: none    QRS:     QRS axis:  Normal    QRS intervals:  Normal  Conduction:     Conduction: normal    ST segments:     ST segments:  Normal  T waves:     T waves: flattening      Flattening:  III and V1  Q waves:     Q waves:  V1  Comments:      No ecg signs of ischemia/ injury / r heart strain          Deja De La O MD  03/14/19 5394

## 2019-03-14 NOTE — ED NOTES
Pt's wife can be reached on her cell phone if needed; [phone]         Ren Chiang RN  03/14/19 1106

## 2019-03-17 NOTE — ED PROVIDER NOTES
History  Chief Complaint   Patient presents with    Vomiting     vomiting since sunday, hx of gastroparesis which pt states todays s/s "the exact same as when he is in gastroparesis"  52 yr male- with hx of similar episodes  In past- hx of niddm-- with 24 hrs of multiple repetitive  N/v- all  On bloody followed by upper abd pain -- no fevers- change in stool- no ill contacts-- did not take any of his meds for 24 hrs-- no cp/sob      History provided by:  Patient and spouse   used: No        Prior to Admission Medications   Prescriptions Last Dose Informant Patient Reported? Taking?    ALPRAZolam (XANAX) 0 5 mg tablet   No No   Sig: Take 1 tablet by mouth 2 (two) times a day as needed for anxiety for up to 10 days   atorvastatin (LIPITOR) 20 mg tablet   Yes No   Sig: Take 20 mg by mouth daily   insulin lispro (HUMALOG KWIKPEN) 100 Units/mL SOPN   No No   Sig: Inject 15 Units under the skin 3 (three) times a day with meals   lisinopril (ZESTRIL) 10 mg tablet   Yes No   Sig: Take 10 mg by mouth daily   metFORMIN (GLUCOPHAGE-XR) 750 mg 24 hr tablet   Yes No   Sig: Take 750 mg by mouth daily with breakfast   ondansetron (ZOFRAN-ODT) 4 mg disintegrating tablet   No No   Sig: Take 1 tablet (4 mg total) by mouth every 8 (eight) hours as needed for nausea or vomiting for up to 3 days   pantoprazole (PROTONIX) 40 mg tablet   Yes No   Sig: Take 40 mg by mouth daily      Facility-Administered Medications: None       Past Medical History:   Diagnosis Date    Diabetes mellitus ([facility] [address] )     Hypertension     Neuropathy        Past Surgical History:   Procedure Laterality Date    SD AMPUTATION FOOT,TRANSMETATARSAL Left 10/30/2017    Procedure: AMPUTATION TRANSMETATARSAL (TMA);  Surgeon: Joe Montero DPM;  Location: [facility];  Service: Podiatry    [address] [place] [address] Left 11/3/2017    Procedure: AMPUTATION TRANSMETATARSAL (TMA), left foot;  Surgeon: Joe Montero DPM; Location[facility];  Service: Podiatry       History reviewed  No pertinent family history  I have reviewed and agree with the history as documented  Social History     Tobacco Use    Smoking status: Current Some Day Smoker     Types: Cigars    Smokeless tobacco: Never Used   Substance Use Topics    Alcohol use: No    Drug use: No        Review of Systems   Constitutional: Negative  HENT: Negative  Eyes: Negative  Respiratory: Negative  Cardiovascular: Negative  Gastrointestinal: Positive for abdominal pain, nausea and vomiting  Negative for abdominal distention, anal bleeding, blood in stool, constipation, diarrhea and rectal pain  Endocrine: Negative  Genitourinary: Negative  Musculoskeletal: Negative  Skin: Negative  Allergic/Immunologic: Negative  Neurological: Negative  Hematological: Negative  Psychiatric/Behavioral: Negative  Physical Exam  Physical Exam   Constitutional: He is oriented to person, place, and time  He appears well-developed and well-nourished  He appears distressed  acive vomiting-- htnsive-- pulse ox 98 % on ra- interpretation is normal- no intervention    HENT:   Head: Normocephalic and atraumatic  Eyes: Pupils are equal, round, and reactive to light  Conjunctivae and EOM are normal  Right eye exhibits no discharge  Left eye exhibits no discharge  No scleral icterus  Mm pink   Neck: Normal range of motion  Neck supple  No JVD present  No tracheal deviation present  No thyromegaly present  Cardiovascular: Normal rate, regular rhythm, normal heart sounds and intact distal pulses  Exam reveals no gallop and no friction rub  No murmur heard  Pulmonary/Chest: Effort normal and breath sounds normal  No stridor  No respiratory distress  He has no wheezes  He has no rales  He exhibits no tenderness  Abdominal: Soft  Bowel sounds are normal  He exhibits no distension and no mass  There is tenderness   There is no rebound and no guarding  No hernia  Pos epigastric tenderness- soft- no peritoneal signs no cva tenderness- no pulsatile abd mass/bruit   Musculoskeletal: Normal range of motion  He exhibits edema  He exhibits no tenderness or deformity  Trace ble pretibial edema- nt- no assym/ erythem- equal bilateral radial/dp pulses   Lymphadenopathy:     He has no cervical adenopathy  Neurological: He is alert and oriented to person, place, and time  No cranial nerve deficit or sensory deficit  He exhibits normal muscle tone  Coordination normal    Skin: Skin is warm  Capillary refill takes less than 2 seconds  No rash noted  He is not diaphoretic  No erythema  No pallor  Psychiatric: He has a normal mood and affect  His behavior is normal  Judgment and thought content normal    Nursing note and vitals reviewed        Vital Signs  ED Triage Vitals   Temperature Pulse Respirations Blood Pressure SpO2   03/14/19 0926 03/14/19 0926 03/14/19 0926 03/14/19 0926 03/14/19 0926   97 9 °F (36 6 °C) 97 22 (!) 181/109 99 %      Temp Source Heart Rate Source Patient Position - Orthostatic VS BP Location FiO2 (%)   03/14/19 0926 03/14/19 0926 -- -- --   Oral Monitor         Pain Score       03/14/19 1022       Worst Possible Pain           Vitals:    03/14/19 0926 03/14/19 1022 03/14/19 1215   BP: (!) 181/109 (!) 187/79    Pulse: 97 83 84       qSOFA     Row Name 03/14/19 1022 03/14/19 0926             Altered mental status GCS < 15  --  --       Respiratory Rate > / =22  0  1       Systolic BP < / =486  0  0       Q Sofa Score  0  1             Visual Acuity      ED Medications  Medications   ondansetron (ZOFRAN) 4 mg/2 mL injection **ADS Override Pull** (4 mg  Given 3/14/19 1044)   haloperidol lactate (HALDOL) injection 5 mg (5 mg Intramuscular Given 3/14/19 1118)   metFORMIN (GLUCOPHAGE) tablet 850 mg (850 mg Oral Given 3/14/19 1404)   lisinopril (ZESTRIL) tablet 10 mg (10 mg Oral Given 3/14/19 1404)       Diagnostic Studies  Results Reviewed Procedure Component Value Units Date/Time    Comprehensive metabolic panel [[id number]]  (Abnormal) Collected:  03/14/19 1022    Lab Status:  Final result Specimen:  Blood from Arm, Right Updated:  03/14/19 1044     Sodium 133 mmol/L      Potassium 3 9 mmol/L      Chloride 97 mmol/L      CO2 25 mmol/L      ANION GAP 11 mmol/L      BUN 19 mg/dL      Creatinine 1 18 mg/dL      Glucose 233 mg/dL      Calcium 8 9 mg/dL      AST 11 U/L      ALT 31 U/L      Alkaline Phosphatase 69 U/L      Total Protein 6 9 g/dL      Albumin 3 8 g/dL      Total Bilirubin 3 20 mg/dL      eGFR 69 ml/min/1 73sq m     Narrative:       National Kidney Disease Education Program recommendations are as follows:  GFR calculation is accurate only with a steady state creatinine  Chronic Kidney disease less than 60 ml/min/1 73 sq  meters  Kidney failure less than 15 ml/min/1 73 sq  meters      Lipase [[id number]]  (Normal) Collected:  03/14/19 1022    Lab Status:  Final result Specimen:  Blood from Arm, Right Updated:  03/14/19 1044     Lipase 334 u/L     CBC and differential [[id number]]  (Abnormal) Collected:  03/14/19 1022    Lab Status:  Final result Specimen:  Blood from Arm, Right Updated:  03/14/19 1029     WBC 13 23 Thousand/uL      RBC 6 46 Million/uL      Hemoglobin 18 3 g/dL      Hematocrit 52 1 %      MCV 81 fL      MCH 28 3 pg      MCHC 35 1 g/dL      RDW 12 6 %      MPV 8 9 fL      Platelets 817 Thousands/uL      nRBC 0 /100 WBCs      Neutrophils Relative 76 %      Immat GRANS % 1 %      Lymphocytes Relative 17 %      Monocytes Relative 5 %      Eosinophils Relative 0 %      Basophils Relative 1 %      Neutrophils Absolute 10 14 Thousands/µL      Immature Grans Absolute 0 08 Thousand/uL      Lymphocytes Absolute 2 27 Thousands/µL      Monocytes Absolute 0 65 Thousand/µL      Eosinophils Absolute 0 03 Thousand/µL      Basophils Absolute 0 06 Thousands/µL                  No orders to display              Procedures  Procedures       Phone Contacts  ED Phone Contact    ED Course  ED Course as of Mar 17 1335   Thu Mar 14, 2019   1247 - er md note-  pt feels improved-  with symptoms- will start po challenge and re-eval       1320 Er md note- p t tolerating po challenge in e4r- will attempt to give pt back  po meds- prior to er d/c      [phone] - er md note- previous labs reviewed by er md --  similar- elevated bilirubin is not new -  5/18 ct of abd/pelvis reviewed by er md      1448 Er md note- pt tolerated oral meds with no complaints prior to er d/c                                  MDM    Disposition  Final diagnoses:   Upper abdominal pain   Nausea and vomiting, intractability of vomiting not specified, unspecified vomiting type   Hyperglycemia   Serum total bilirubin elevated     Time reflects when diagnosis was documented in both MDM as applicable and the Disposition within this note     Time User Action Codes Description Comment    3/14/2019  2:49 PM Christi HEDRICK Add [R10 10] Upper abdominal pain     3/14/2019  2:49 PM Christi HEDRICK Add [R11 2] Nausea and vomiting, intractability of vomiting not specified, unspecified vomiting type     3/14/2019  2:49 PM Christi HEDRICK Add [R73 9] Hyperglycemia     3/14/2019  2:55 PM Antonio Herndon Add [R17] Serum total bilirubin elevated       ED Disposition     ED Disposition Condition Date/Time Comment    Discharge Stable Thu Mar 14, 2019  2:49 PM [facility] discharge to home/self care              Follow-up Information    None         Discharge Medication List as of 3/14/2019  2:55 PM      START taking these medications    Details   metoclopramide (REGLAN) 10 mg tablet Take 1 tablet (10 mg total) by mouth 3 (three) times a day as needed (nausea/ vomiting) As needed for nausea, Starting Thu 3/14/2019, Print         CONTINUE these medications which have NOT CHANGED    Details   ALPRAZolam (XANAX) 0 5 mg tablet Take 1 tablet by mouth 2 (two) times a day as needed for anxiety for up to 10 days, Starting Tue 11/7/2017, Until Thu 5/3/2018, Print      atorvastatin (LIPITOR) 20 mg tablet Take 20 mg by mouth daily, Historical Med      insulin lispro (HUMALOG KWIKPEN) 100 Units/mL SOPN Inject 15 Units under the skin 3 (three) times a day with meals, Starting Tue 11/7/2017, Print      lisinopril (ZESTRIL) 10 mg tablet Take 10 mg by mouth daily, Historical Med      metFORMIN (GLUCOPHAGE-XR) 750 mg 24 hr tablet Take 750 mg by mouth daily with breakfast, Historical Med      ondansetron (ZOFRAN-ODT) 4 mg disintegrating tablet Take 1 tablet (4 mg total) by mouth every 8 (eight) hours as needed for nausea or vomiting for up to 3 days, Starting Sat 5/5/2018, Until Tue 5/8/2018, Print      pantoprazole (PROTONIX) 40 mg tablet Take 40 mg by mouth daily, Historical Med           No discharge procedures on file      ED Provider  Electronically Signed by           Lizy Matthew MD  03/17/19 4452

## 2019-03-17 NOTE — ED PROCEDURE NOTE
PROCEDURE  CriticalCare Time  Performed by: Evelyn Del Angel MD  Authorized by: Evelyn Del Angel MD     Critical care provider statement:     Critical care time (minutes):  35    Critical care start time:  3/14/2019 11:00 AM    Critical care end time:  3/14/2019 11:35 AM    Critical care time was exclusive of:  Separately billable procedures and treating other patients and teaching time    Critical care was necessary to treat or prevent imminent or life-threatening deterioration of the following conditions:  Dehydration    Critical care was time spent personally by me on the following activities:  Examination of patient, evaluation of patient's response to treatment, development of treatment plan with patient or surrogate, obtaining history from patient or surrogate, re-evaluation of patient's condition, ordering and review of laboratory studies and review of old charts    I assumed direction of critical care for this patient from another provider in my specialty: den Del Angel MD  03/17/19 3652

## 2021-04-17 ENCOUNTER — HOSPITAL ENCOUNTER (EMERGENCY)
Facility: HOSPITAL | Age: 56
Discharge: HOME | End: 2021-04-17
Attending: EMERGENCY MEDICINE
Payer: MEDICARE

## 2021-04-17 ENCOUNTER — APPOINTMENT (EMERGENCY)
Dept: RADIOLOGY | Facility: HOSPITAL | Age: 56
End: 2021-04-17
Payer: MEDICARE

## 2021-04-17 VITALS
WEIGHT: 232.81 LBS | DIASTOLIC BLOOD PRESSURE: 83 MMHG | BODY MASS INDEX: 29.1 KG/M2 | SYSTOLIC BLOOD PRESSURE: 149 MMHG | RESPIRATION RATE: 18 BRPM | TEMPERATURE: 98.2 F | HEART RATE: 92 BPM | OXYGEN SATURATION: 94 %

## 2021-04-17 DIAGNOSIS — K31.84 GASTROPARESIS: ICD-10-CM

## 2021-04-17 DIAGNOSIS — R10.84 GENERALIZED ABDOMINAL PAIN: Primary | ICD-10-CM

## 2021-04-17 DIAGNOSIS — R11.2 NAUSEA AND VOMITING: ICD-10-CM

## 2021-04-17 LAB
ALBUMIN SERPL BCP-MCNC: 2.9 G/DL (ref 3.5–5)
ALP SERPL-CCNC: 79 U/L (ref 46–116)
ALT SERPL W P-5'-P-CCNC: 28 U/L (ref 12–78)
ANION GAP SERPL CALCULATED.3IONS-SCNC: 17 MMOL/L (ref 4–13)
ANION GAP SERPL CALCULATED.3IONS-SCNC: 9 MMOL/L (ref 4–13)
AST SERPL W P-5'-P-CCNC: 29 U/L (ref 5–45)
BASOPHILS # BLD AUTO: 0.04 THOUSANDS/ΜL (ref 0–0.1)
BASOPHILS NFR BLD AUTO: 0 % (ref 0–1)
BILIRUB SERPL-MCNC: 2.57 MG/DL (ref 0.2–1)
BUN SERPL-MCNC: 21 MG/DL (ref 5–25)
BUN SERPL-MCNC: 26 MG/DL (ref 5–25)
CALCIUM ALBUM COR SERPL-MCNC: 9.8 MG/DL (ref 8.3–10.1)
CALCIUM SERPL-MCNC: 7.6 MG/DL (ref 8.3–10.1)
CALCIUM SERPL-MCNC: 8.9 MG/DL (ref 8.3–10.1)
CHLORIDE SERPL-SCNC: 102 MMOL/L (ref 100–108)
CHLORIDE SERPL-SCNC: 95 MMOL/L (ref 100–108)
CO2 SERPL-SCNC: 16 MMOL/L (ref 21–32)
CO2 SERPL-SCNC: 24 MMOL/L (ref 21–32)
CREAT SERPL-MCNC: 0.86 MG/DL (ref 0.6–1.3)
CREAT SERPL-MCNC: 0.9 MG/DL (ref 0.6–1.3)
EOSINOPHIL # BLD AUTO: 0 THOUSAND/ΜL (ref 0–0.61)
EOSINOPHIL NFR BLD AUTO: 0 % (ref 0–6)
ERYTHROCYTE [DISTWIDTH] IN BLOOD BY AUTOMATED COUNT: 12.2 % (ref 11.6–15.1)
GFR SERPL CREATININE-BSD FRML MDRD: 94 ML/MIN/1.73SQ M
GFR SERPL CREATININE-BSD FRML MDRD: 96 ML/MIN/1.73SQ M
GLUCOSE SERPL-MCNC: 274 MG/DL (ref 65–140)
GLUCOSE SERPL-MCNC: 328 MG/DL (ref 65–140)
HCT VFR BLD AUTO: 43.1 % (ref 36.5–49.3)
HGB BLD-MCNC: 15.2 G/DL (ref 12–17)
IMM GRANULOCYTES # BLD AUTO: 0.15 THOUSAND/UL (ref 0–0.2)
IMM GRANULOCYTES NFR BLD AUTO: 1 % (ref 0–2)
LIPASE SERPL-CCNC: 216 U/L (ref 73–393)
LYMPHOCYTES # BLD AUTO: 1.32 THOUSANDS/ΜL (ref 0.6–4.47)
LYMPHOCYTES NFR BLD AUTO: 12 % (ref 14–44)
MCH RBC QN AUTO: 29.3 PG (ref 26.8–34.3)
MCHC RBC AUTO-ENTMCNC: 35.3 G/DL (ref 31.4–37.4)
MCV RBC AUTO: 83 FL (ref 82–98)
MONOCYTES # BLD AUTO: 0.53 THOUSAND/ΜL (ref 0.17–1.22)
MONOCYTES NFR BLD AUTO: 5 % (ref 4–12)
NEUTROPHILS # BLD AUTO: 8.82 THOUSANDS/ΜL (ref 1.85–7.62)
NEUTS SEG NFR BLD AUTO: 82 % (ref 43–75)
NRBC BLD AUTO-RTO: 0 /100 WBCS
PLATELET # BLD AUTO: 329 THOUSANDS/UL (ref 149–390)
PMV BLD AUTO: 8.8 FL (ref 8.9–12.7)
POTASSIUM SERPL-SCNC: 3.7 MMOL/L (ref 3.5–5.3)
POTASSIUM SERPL-SCNC: 5.5 MMOL/L (ref 3.5–5.3)
PROT SERPL-MCNC: 7.1 G/DL (ref 6.4–8.2)
RBC # BLD AUTO: 5.18 MILLION/UL (ref 3.88–5.62)
SODIUM SERPL-SCNC: 128 MMOL/L (ref 136–145)
SODIUM SERPL-SCNC: 135 MMOL/L (ref 136–145)
WBC # BLD AUTO: 10.86 THOUSAND/UL (ref 4.31–10.16)

## 2021-04-17 PROCEDURE — 80053 COMPREHEN METABOLIC PANEL: CPT | Performed by: EMERGENCY MEDICINE

## 2021-04-17 PROCEDURE — 80048 BASIC METABOLIC PNL TOTAL CA: CPT | Performed by: EMERGENCY MEDICINE

## 2021-04-17 PROCEDURE — 36415 COLL VENOUS BLD VENIPUNCTURE: CPT | Performed by: EMERGENCY MEDICINE

## 2021-04-17 PROCEDURE — 99284 EMERGENCY DEPT VISIT MOD MDM: CPT | Performed by: EMERGENCY MEDICINE

## 2021-04-17 PROCEDURE — 96361 HYDRATE IV INFUSION ADD-ON: CPT

## 2021-04-17 PROCEDURE — 83690 ASSAY OF LIPASE: CPT | Performed by: EMERGENCY MEDICINE

## 2021-04-17 PROCEDURE — 96375 TX/PRO/DX INJ NEW DRUG ADDON: CPT

## 2021-04-17 PROCEDURE — 99284 EMERGENCY DEPT VISIT MOD MDM: CPT

## 2021-04-17 PROCEDURE — 96374 THER/PROPH/DIAG INJ IV PUSH: CPT

## 2021-04-17 PROCEDURE — 74022 RADEX COMPL AQT ABD SERIES: CPT

## 2021-04-17 PROCEDURE — 85025 COMPLETE CBC W/AUTO DIFF WBC: CPT | Performed by: EMERGENCY MEDICINE

## 2021-04-17 PROCEDURE — 96376 TX/PRO/DX INJ SAME DRUG ADON: CPT

## 2021-04-17 RX ORDER — DIPHENHYDRAMINE HYDROCHLORIDE 50 MG/ML
25 INJECTION INTRAMUSCULAR; INTRAVENOUS ONCE
Status: COMPLETED | OUTPATIENT
Start: 2021-04-17 | End: 2021-04-17

## 2021-04-17 RX ORDER — METOCLOPRAMIDE HYDROCHLORIDE 5 MG/ML
10 INJECTION INTRAMUSCULAR; INTRAVENOUS ONCE
Status: COMPLETED | OUTPATIENT
Start: 2021-04-17 | End: 2021-04-17

## 2021-04-17 RX ADMIN — DIPHENHYDRAMINE HYDROCHLORIDE 25 MG: 50 INJECTION, SOLUTION INTRAMUSCULAR; INTRAVENOUS at 14:42

## 2021-04-17 RX ADMIN — SODIUM CHLORIDE 1000 ML: 0.9 INJECTION, SOLUTION INTRAVENOUS at 12:18

## 2021-04-17 RX ADMIN — METOCLOPRAMIDE HYDROCHLORIDE 10 MG: 5 INJECTION INTRAMUSCULAR; INTRAVENOUS at 10:49

## 2021-04-17 RX ADMIN — SODIUM CHLORIDE 1000 ML: 0.9 INJECTION, SOLUTION INTRAVENOUS at 10:49

## 2021-04-17 RX ADMIN — DIPHENHYDRAMINE HYDROCHLORIDE 25 MG: 50 INJECTION, SOLUTION INTRAMUSCULAR; INTRAVENOUS at 10:49

## 2021-04-17 RX ADMIN — METOCLOPRAMIDE HYDROCHLORIDE 10 MG: 5 INJECTION INTRAMUSCULAR; INTRAVENOUS at 14:42

## 2021-04-17 NOTE — ED PROVIDER NOTES
History  Chief Complaint   Patient presents with    Abdominal Pain     Abdominal pain off and on x few weeks, per patient he saw FMD this week, reports having gastroperesis, SOB this morning     68-year-old male presents today reporting abdominal pain intermittently for a few weeks it has been getting progressively worse  He states he has been able to keep anything down today  Reports a history of gastroparesis due to diabetes and states this happens occasionally  Reports being on medication for it but he vomited it up this morning  Denies fever  Denies diarrhea  History provided by:  Patient  Abdominal Pain  Pain location:  Generalized  Pain quality: aching and cramping    Pain radiates to:  Does not radiate  Pain severity:  Moderate  Onset quality:  Unable to specify  Timing:  Constant  Progression:  Worsening  Chronicity:  Recurrent  Context comment:  See HPI  Relieved by:  None tried  Worsened by:  Nothing  Ineffective treatments:  None tried  Associated symptoms: no chest pain, no chills, no constipation, no diarrhea, no dysuria, no fatigue, no fever, no nausea, no shortness of breath, no sore throat and no vomiting    Risk factors: not elderly, has not had multiple surgeries and no recent hospitalization        Prior to Admission Medications   Prescriptions Last Dose Informant Patient Reported? Taking?    ALPRAZolam (XANAX) 0 5 mg tablet   No No   Sig: Take 1 tablet by mouth 2 (two) times a day as needed for anxiety for up to 10 days   atorvastatin (LIPITOR) 20 mg tablet   Yes No   Sig: Take 20 mg by mouth daily   insulin lispro (HUMALOG KWIKPEN) 100 Units/mL SOPN   No No   Sig: Inject 15 Units under the skin 3 (three) times a day with meals   lisinopril (ZESTRIL) 10 mg tablet   Yes No   Sig: Take 10 mg by mouth daily   metFORMIN (GLUCOPHAGE-XR) 750 mg 24 hr tablet   Yes No   Sig: Take 750 mg by mouth daily with breakfast   metoclopramide (REGLAN) 10 mg tablet   No No   Sig: Take 1 tablet (10 mg total) by mouth 3 (three) times a day as needed (nausea/ vomiting) As needed for nausea   pantoprazole (PROTONIX) 40 mg tablet   Yes No   Sig: Take 40 mg by mouth daily      Facility-Administered Medications: None       Past Medical History:   Diagnosis Date    Diabetes mellitus ([facility] [address] )     Hypertension     Neuropathy        Past Surgical History:   Procedure Laterality Date    [place] AMPUTATION FOOT,TRANSMETATARSAL Left 10/30/2017    Procedure: AMPUTATION TRANSMETATARSAL (TMA);  Surgeon: Cathleen Cardoza DPM;  [facility];  Service: Podiatry    [address] [place] [address] Left 11/3/2017    Procedure: AMPUTATION TRANSMETATARSAL (TMA), left foot;  Surgeon: Cathleen Cardoza DPM;  [facility];  Service: Podiatry       No family history on file  I have reviewed and agree with the history as documented  E-Cigarette/Vaping     E-Cigarette/Vaping Substances     Social History     Tobacco Use    Smoking status: Current Some Day Smoker     Types: Cigars    Smokeless tobacco: Never Used   Substance Use Topics    Alcohol use: No    Drug use: No       Review of Systems   Constitutional: Negative for chills, fatigue and fever  HENT: Negative for congestion, postnasal drip, sore throat and trouble swallowing  Eyes: Negative for visual disturbance  Respiratory: Negative for chest tightness and shortness of breath  Cardiovascular: Negative for chest pain  Gastrointestinal: Positive for abdominal pain  Negative for constipation, diarrhea, nausea and vomiting  Genitourinary: Negative for dysuria  Musculoskeletal: Negative for back pain  Skin: Negative for rash  Allergic/Immunologic: Negative for immunocompromised state  Neurological: Negative for dizziness, light-headedness and headaches  Psychiatric/Behavioral: Negative for confusion  Physical Exam  Physical Exam  Vitals signs and nursing note reviewed  Constitutional:       Appearance: He is well-developed   He is ill-appearing (Appears uncomfortable)  HENT:      Head: Normocephalic and atraumatic  Mouth/Throat:      Mouth: Mucous membranes are moist       Pharynx: Uvula midline  Tonsils: No tonsillar exudate  Eyes:      Pupils: Pupils are equal, round, and reactive to light  Neck:      Musculoskeletal: Normal range of motion and neck supple  Cardiovascular:      Rate and Rhythm: Normal rate and regular rhythm  Pulmonary:      Effort: Pulmonary effort is normal       Breath sounds: Normal breath sounds  Abdominal:      General: Bowel sounds are normal  There is no distension  Palpations: Abdomen is soft  Tenderness: There is generalized abdominal tenderness  There is no guarding or rebound  Musculoskeletal:         General: No tenderness or deformity  Skin:     General: Skin is warm and dry  Capillary Refill: Capillary refill takes less than 2 seconds  Neurological:      General: No focal deficit present  Mental Status: He is alert and oriented to person, place, and time  Comments: Patient moving all extremities equally, no focal neuro deficits noted         Psychiatric:         Mood and Affect: Mood normal          Behavior: Behavior normal          Vital Signs  ED Triage Vitals   Temperature Pulse Respirations Blood Pressure SpO2   04/17/21 1012 04/17/21 1012 04/17/21 1012 04/17/21 1012 04/17/21 1012   98 2 °F (36 8 °C) 92 18 (!) 119/114 98 %      Temp Source Heart Rate Source Patient Position - Orthostatic VS BP Location FiO2 (%)   04/17/21 1012 04/17/21 1012 04/17/21 1500 04/17/21 1012 --   Oral Monitor Lying Left arm       Pain Score       04/17/21 1012       Worst Possible Pain           Vitals:    04/17/21 1012 04/17/21 1200 04/17/21 1500 04/17/21 1530   BP: (!) 119/114 118/73 131/78 149/83   Pulse: 92 82 93 92   Patient Position - Orthostatic VS:   Lying          Visual Acuity      ED Medications  Medications   sodium chloride 0 9 % bolus 1,000 mL (0 mL Intravenous Stopped 4/17/21 1218)   metoclopramide (REGLAN) injection 10 mg (10 mg Intravenous Given 4/17/21 1049)   diphenhydrAMINE (BENADRYL) injection 25 mg (25 mg Intravenous Given 4/17/21 1049)   sodium chloride 0 9 % bolus 1,000 mL (0 mL Intravenous Stopped 4/17/21 1634)   metoclopramide (REGLAN) injection 10 mg (10 mg Intravenous Given 4/17/21 1442)   diphenhydrAMINE (BENADRYL) injection 25 mg (25 mg Intravenous Given 4/17/21 1442)       Diagnostic Studies  Results Reviewed     Procedure Component Value Units Date/Time    Basic metabolic panel [[id number]]  (Abnormal) Collected: 04/17/21 1301    Lab Status: Final result Specimen: Blood from Hand, Right Updated: 04/17/21 1347     Sodium 135 mmol/L      Potassium 3 7 mmol/L      Chloride 102 mmol/L      CO2 24 mmol/L      ANION GAP 9 mmol/L      BUN 21 mg/dL      Creatinine 0 86 mg/dL      Glucose 274 mg/dL      Calcium 7 6 mg/dL      eGFR 96 ml/min/1 73sq m     Narrative:      [place] guidelines for Chronic Kidney Disease (CKD):     Stage 1 with normal or high GFR (GFR > 90 mL/min/1 73 square meters)    Stage 2 Mild CKD (GFR = 60-89 mL/min/1 73 square meters)    Stage 3A Moderate CKD (GFR = 45-59 mL/min/1 73 square meters)    Stage 3B Moderate CKD (GFR = 30-44 mL/min/1 73 square meters)    Stage 4 Severe CKD (GFR = 15-29 mL/min/1 73 square meters)    Stage 5 End Stage CKD (GFR <15 mL/min/1 73 square meters)  Note: GFR calculation is accurate only with a steady state creatinine    Comprehensive metabolic panel [[id number]]  (Abnormal) Collected: 04/17/21 1055    Lab Status: Final result Specimen: Blood from Arm, Right Updated: 04/17/21 1204     Sodium 128 mmol/L      Potassium 5 5 mmol/L      Chloride 95 mmol/L      CO2 16 mmol/L      ANION GAP 17 mmol/L      BUN 26 mg/dL      Creatinine 0 90 mg/dL      Glucose 328 mg/dL      Calcium 8 9 mg/dL      Corrected Calcium 9 8 mg/dL      AST 29 U/L      ALT 28 U/L      Alkaline Phosphatase 79 U/L      Total Protein 7 1 g/dL      Albumin 2 9 g/dL      Total Bilirubin 2 57 mg/dL      eGFR 94 ml/min/1 73sq m     Narrative:      National Kidney Disease Foundation guidelines for Chronic Kidney Disease (CKD):     Stage 1 with normal or high GFR (GFR > 90 mL/min/1 73 square meters)    Stage 2 Mild CKD (GFR = 60-89 mL/min/1 73 square meters)    Stage 3A Moderate CKD (GFR = 45-59 mL/min/1 73 square meters)    Stage 3B Moderate CKD (GFR = 30-44 mL/min/1 73 square meters)    Stage 4 Severe CKD (GFR = 15-29 mL/min/1 73 square meters)    Stage 5 End Stage CKD (GFR <15 mL/min/1 73 square meters)  Note: GFR calculation is accurate only with a steady state creatinine    CBC and differential [[id number]]  (Abnormal) Collected: 04/17/21 1140    Lab Status: Final result Specimen: Blood from Arm, Right Updated: 04/17/21 1155     WBC 10 86 Thousand/uL      RBC 5 18 Million/uL      Hemoglobin 15 2 g/dL      Hematocrit 43 1 %      MCV 83 fL      MCH 29 3 pg      MCHC 35 3 g/dL      RDW 12 2 %      MPV 8 8 fL      Platelets 798 Thousands/uL      nRBC 0 /100 WBCs      Neutrophils Relative 82 %      Immat GRANS % 1 %      Lymphocytes Relative 12 %      Monocytes Relative 5 %      Eosinophils Relative 0 %      Basophils Relative 0 %      Neutrophils Absolute 8 82 Thousands/µL      Immature Grans Absolute 0 15 Thousand/uL      Lymphocytes Absolute 1 32 Thousands/µL      Monocytes Absolute 0 53 Thousand/µL      Eosinophils Absolute 0 00 Thousand/µL      Basophils Absolute 0 04 Thousands/µL     Lipase [[id number]]  (Normal) Collected: 04/17/21 1055    Lab Status: Final result Specimen: Blood from Arm, Right Updated: 04/17/21 1121     Lipase 216 u/L                  XR abdomen obstruction series    (Results Pending)              Procedures  Procedures         ED Course                                           MDM  Number of Diagnoses or Management Options  Diagnosis management comments: 11:44 AM  Feeling better after Reglan and IVF  Waiting on labs  3:22 PM  Patient's pain and nausea returned earlier after the first dose of meds  Repeated meds and added obstruction series  No obvious abnormality on obstruction series  Feeling better after second dose of reglan and 2nd liter  Labs improved  Will Po trial   Plan DC  Amount and/or Complexity of Data Reviewed  Clinical lab tests: ordered and reviewed  Tests in the medicine section of CPT®: ordered and reviewed  Review and summarize past medical records: yes  Independent visualization of images, tracings, or specimens: yes    Risk of Complications, Morbidity, and/or Mortality  Presenting problems: high  Diagnostic procedures: high  Management options: high    Patient Progress  Patient progress: improved      Disposition  Final diagnoses:   Generalized abdominal pain   Nausea and vomiting   Gastroparesis     Time reflects when diagnosis was documented in both MDM as applicable and the Disposition within this note     Time User Action Codes Description Comment    4/17/2021  3:23 PM Ruma Vo Add [R10 84] Generalized abdominal pain     4/17/2021  3:23 PM Ruma Vo Add [R11 2] Nausea and vomiting     4/17/2021  3:23 PM Tj Madrigal Add [K31 84] Gastroparesis       ED Disposition     ED Disposition Condition Date/Time Comment    Discharge Stable Sat Apr 17, 2021  3:23 PM [address] [facility] discharge to home/self care              Follow-up Information     Follow up With Specialties Details Why Contact Info Additional [address], DO Family Medicine Schedule an appointment as soon as possible for a visit  As needed [address]  [place] [address]  [phone]       [address] Emergency Department Emergency Medicine  If symptoms worsen [address] Emergency Department, [address], [place], [place], [address] Discharge Medication List as of 4/17/2021  4:14 PM      CONTINUE these medications which have NOT CHANGED    Details   ALPRAZolam (XANAX) 0 5 mg tablet Take 1 tablet by mouth 2 (two) times a day as needed for anxiety for up to 10 days, Starting Tue 11/7/2017, Until Thu 5/3/2018, Print      atorvastatin (LIPITOR) 20 mg tablet Take 20 mg by mouth daily, Historical Med      insulin lispro (HUMALOG KWIKPEN) 100 Units/mL SOPN Inject 15 Units under the skin 3 (three) times a day with meals, Starting Tue 11/7/2017, Print      lisinopril (ZESTRIL) 10 mg tablet Take 10 mg by mouth daily, Historical Med      metFORMIN (GLUCOPHAGE-XR) 750 mg 24 hr tablet Take 750 mg by mouth daily with breakfast, Historical Med      metoclopramide (REGLAN) 10 mg tablet Take 1 tablet (10 mg total) by mouth 3 (three) times a day as needed (nausea/ vomiting) As needed for nausea, Starting Thu 3/14/2019, Print      pantoprazole (PROTONIX) 40 mg tablet Take 40 mg by mouth daily, Historical Med           No discharge procedures on file      PDMP Review     None          ED Provider  Electronically Signed by           Quirino Alonzo DO  04/17/21 2115

## 2021-07-04 ENCOUNTER — APPOINTMENT (EMERGENCY)
Dept: CT IMAGING | Facility: HOSPITAL | Age: 56
End: 2021-07-04
Payer: MEDICARE

## 2021-07-04 ENCOUNTER — HOSPITAL ENCOUNTER (OUTPATIENT)
Facility: HOSPITAL | Age: 56
Setting detail: OBSERVATION
Discharge: HOME | End: 2021-07-05
Attending: EMERGENCY MEDICINE | Admitting: FAMILY MEDICINE
Payer: MEDICARE

## 2021-07-04 ENCOUNTER — APPOINTMENT (EMERGENCY)
Dept: RADIOLOGY | Facility: HOSPITAL | Age: 56
End: 2021-07-04
Payer: MEDICARE

## 2021-07-04 DIAGNOSIS — R10.13 EPIGASTRIC PAIN: ICD-10-CM

## 2021-07-04 DIAGNOSIS — IMO0002 UNCONTROLLED TYPE 2 DIABETES MELLITUS WITH FOOT ULCER, WITHOUT LONG-TERM CURRENT USE OF INSULIN: ICD-10-CM

## 2021-07-04 DIAGNOSIS — R11.2 INTRACTABLE VOMITING WITH NAUSEA: ICD-10-CM

## 2021-07-04 DIAGNOSIS — R78.89 ELEVATED BETA-HYDROXYBUTYRATE: ICD-10-CM

## 2021-07-04 DIAGNOSIS — E86.0 DEHYDRATION: ICD-10-CM

## 2021-07-04 DIAGNOSIS — R11.2 NAUSEA AND VOMITING: Primary | ICD-10-CM

## 2021-07-04 DIAGNOSIS — R73.9 HYPERGLYCEMIA: ICD-10-CM

## 2021-07-04 LAB
ALBUMIN SERPL BCP-MCNC: 3.7 G/DL (ref 3.5–5)
ALP SERPL-CCNC: 85 U/L (ref 46–116)
ALT SERPL W P-5'-P-CCNC: 32 U/L (ref 12–78)
ANION GAP SERPL CALCULATED.3IONS-SCNC: 12 MMOL/L (ref 4–13)
AST SERPL W P-5'-P-CCNC: 33 U/L (ref 5–45)
ATRIAL RATE: 88 BPM
BACTERIA UR QL AUTO: ABNORMAL /HPF
BASE EXCESS BLDA CALC-SCNC: 4 MMOL/L (ref -2–3)
BASOPHILS # BLD AUTO: 0.06 THOUSANDS/ΜL (ref 0–0.1)
BASOPHILS NFR BLD AUTO: 1 % (ref 0–1)
BETA-HYDROXYBUTYRATE: 0.8 MMOL/L
BILIRUB SERPL-MCNC: 2.59 MG/DL (ref 0.2–1)
BILIRUB UR QL STRIP: NEGATIVE
BUN SERPL-MCNC: 19 MG/DL (ref 5–25)
CALCIUM SERPL-MCNC: 8.9 MG/DL (ref 8.3–10.1)
CHLORIDE SERPL-SCNC: 94 MMOL/L (ref 100–108)
CLARITY UR: CLEAR
CO2 SERPL-SCNC: 21 MMOL/L (ref 21–32)
COLOR UR: YELLOW
CREAT SERPL-MCNC: 1.01 MG/DL (ref 0.6–1.3)
EOSINOPHIL # BLD AUTO: 0.04 THOUSAND/ΜL (ref 0–0.61)
EOSINOPHIL NFR BLD AUTO: 0 % (ref 0–6)
ERYTHROCYTE [DISTWIDTH] IN BLOOD BY AUTOMATED COUNT: 11.9 % (ref 11.6–15.1)
GFR SERPL CREATININE-BSD FRML MDRD: 82 ML/MIN/1.73SQ M
GLUCOSE SERPL-MCNC: 229 MG/DL (ref 65–140)
GLUCOSE SERPL-MCNC: 249 MG/DL (ref 65–140)
GLUCOSE SERPL-MCNC: 302 MG/DL (ref 65–140)
GLUCOSE SERPL-MCNC: 304 MG/DL (ref 65–140)
GLUCOSE SERPL-MCNC: 314 MG/DL (ref 65–140)
GLUCOSE UR STRIP-MCNC: ABNORMAL MG/DL
HCO3 BLDA-SCNC: 24.4 MMOL/L (ref 24–30)
HCT VFR BLD AUTO: 49.2 % (ref 36.5–49.3)
HCT VFR BLD CALC: 49 % (ref 36.5–49.3)
HGB BLD-MCNC: 17.6 G/DL (ref 12–17)
HGB BLDA-MCNC: 16.7 G/DL (ref 12–17)
HGB UR QL STRIP.AUTO: ABNORMAL
IMM GRANULOCYTES # BLD AUTO: 0.05 THOUSAND/UL (ref 0–0.2)
IMM GRANULOCYTES NFR BLD AUTO: 1 % (ref 0–2)
KETONES UR STRIP-MCNC: ABNORMAL MG/DL
LEUKOCYTE ESTERASE UR QL STRIP: NEGATIVE
LIPASE SERPL-CCNC: 212 U/L (ref 73–393)
LYMPHOCYTES # BLD AUTO: 2.56 THOUSANDS/ΜL (ref 0.6–4.47)
LYMPHOCYTES NFR BLD AUTO: 23 % (ref 14–44)
MCH RBC QN AUTO: 29.3 PG (ref 26.8–34.3)
MCHC RBC AUTO-ENTMCNC: 35.8 G/DL (ref 31.4–37.4)
MCV RBC AUTO: 82 FL (ref 82–98)
MONOCYTES # BLD AUTO: 0.67 THOUSAND/ΜL (ref 0.17–1.22)
MONOCYTES NFR BLD AUTO: 6 % (ref 4–12)
NEUTROPHILS # BLD AUTO: 7.57 THOUSANDS/ΜL (ref 1.85–7.62)
NEUTS SEG NFR BLD AUTO: 69 % (ref 43–75)
NITRITE UR QL STRIP: POSITIVE
NON-SQ EPI CELLS URNS QL MICRO: ABNORMAL /HPF
NRBC BLD AUTO-RTO: 0 /100 WBCS
P AXIS: 55 DEGREES
PCO2 BLD: 25 MMOL/L (ref 21–32)
PCO2 BLD: 25.2 MM HG (ref 42–50)
PH BLD: 7.59 [PH] (ref 7.3–7.4)
PH UR STRIP.AUTO: 5.5 [PH] (ref 4.5–8)
PLATELET # BLD AUTO: 325 THOUSANDS/UL (ref 149–390)
PMV BLD AUTO: 9.4 FL (ref 8.9–12.7)
PO2 BLD: 40 MM HG (ref 35–45)
POTASSIUM BLD-SCNC: 3.7 MMOL/L (ref 3.5–5.3)
POTASSIUM SERPL-SCNC: 5 MMOL/L (ref 3.5–5.3)
PR INTERVAL: 158 MS
PROT SERPL-MCNC: 7.2 G/DL (ref 6.4–8.2)
PROT UR STRIP-MCNC: NEGATIVE MG/DL
QRS AXIS: 47 DEGREES
QRSD INTERVAL: 98 MS
QT INTERVAL: 364 MS
QTC INTERVAL: 433 MS
RBC # BLD AUTO: 6 MILLION/UL (ref 3.88–5.62)
RBC #/AREA URNS AUTO: ABNORMAL /HPF
SAO2 % BLD FROM PO2: 85 % (ref 60–85)
SODIUM BLD-SCNC: 132 MMOL/L (ref 136–145)
SODIUM SERPL-SCNC: 127 MMOL/L (ref 136–145)
SP GR UR STRIP.AUTO: 1.01 (ref 1–1.03)
SPECIMEN SOURCE: ABNORMAL
T WAVE AXIS: 42 DEGREES
TROPONIN I SERPL-MCNC: <0.02 NG/ML
UROBILINOGEN UR QL STRIP.AUTO: 0.2 E.U./DL
VENTRICULAR RATE: 85 BPM
WBC # BLD AUTO: 10.95 THOUSAND/UL (ref 4.31–10.16)
WBC #/AREA URNS AUTO: ABNORMAL /HPF

## 2021-07-04 PROCEDURE — 81001 URINALYSIS AUTO W/SCOPE: CPT

## 2021-07-04 PROCEDURE — 96361 HYDRATE IV INFUSION ADD-ON: CPT

## 2021-07-04 PROCEDURE — 87186 SC STD MICRODIL/AGAR DIL: CPT

## 2021-07-04 PROCEDURE — 82948 REAGENT STRIP/BLOOD GLUCOSE: CPT

## 2021-07-04 PROCEDURE — 83690 ASSAY OF LIPASE: CPT | Performed by: PHYSICIAN ASSISTANT

## 2021-07-04 PROCEDURE — 85014 HEMATOCRIT: CPT

## 2021-07-04 PROCEDURE — 74177 CT ABD & PELVIS W/CONTRAST: CPT

## 2021-07-04 PROCEDURE — 87077 CULTURE AEROBIC IDENTIFY: CPT

## 2021-07-04 PROCEDURE — 99219 PR INITIAL OBSERVATION CARE/DAY 50 MINUTES: CPT | Performed by: FAMILY MEDICINE

## 2021-07-04 PROCEDURE — 71045 X-RAY EXAM CHEST 1 VIEW: CPT

## 2021-07-04 PROCEDURE — 82010 KETONE BODYS QUAN: CPT | Performed by: PHYSICIAN ASSISTANT

## 2021-07-04 PROCEDURE — 84295 ASSAY OF SERUM SODIUM: CPT

## 2021-07-04 PROCEDURE — 82803 BLOOD GASES ANY COMBINATION: CPT

## 2021-07-04 PROCEDURE — C9113 INJ PANTOPRAZOLE SODIUM, VIA: HCPCS | Performed by: FAMILY MEDICINE

## 2021-07-04 PROCEDURE — 99285 EMERGENCY DEPT VISIT HI MDM: CPT

## 2021-07-04 PROCEDURE — 84132 ASSAY OF SERUM POTASSIUM: CPT

## 2021-07-04 PROCEDURE — 80053 COMPREHEN METABOLIC PANEL: CPT | Performed by: PHYSICIAN ASSISTANT

## 2021-07-04 PROCEDURE — 85025 COMPLETE CBC W/AUTO DIFF WBC: CPT | Performed by: PHYSICIAN ASSISTANT

## 2021-07-04 PROCEDURE — 93005 ELECTROCARDIOGRAM TRACING: CPT

## 2021-07-04 PROCEDURE — 36415 COLL VENOUS BLD VENIPUNCTURE: CPT | Performed by: PHYSICIAN ASSISTANT

## 2021-07-04 PROCEDURE — G1004 CDSM NDSC: HCPCS

## 2021-07-04 PROCEDURE — 96374 THER/PROPH/DIAG INJ IV PUSH: CPT

## 2021-07-04 PROCEDURE — 96375 TX/PRO/DX INJ NEW DRUG ADDON: CPT

## 2021-07-04 PROCEDURE — 82947 ASSAY GLUCOSE BLOOD QUANT: CPT

## 2021-07-04 PROCEDURE — 84484 ASSAY OF TROPONIN QUANT: CPT | Performed by: PHYSICIAN ASSISTANT

## 2021-07-04 PROCEDURE — 99285 EMERGENCY DEPT VISIT HI MDM: CPT | Performed by: PHYSICIAN ASSISTANT

## 2021-07-04 PROCEDURE — 93010 ELECTROCARDIOGRAM REPORT: CPT | Performed by: INTERNAL MEDICINE

## 2021-07-04 PROCEDURE — 87086 URINE CULTURE/COLONY COUNT: CPT

## 2021-07-04 RX ORDER — INSULIN GLARGINE 100 [IU]/ML
32 INJECTION, SOLUTION SUBCUTANEOUS
Status: DISCONTINUED | OUTPATIENT
Start: 2021-07-04 | End: 2021-07-05 | Stop reason: HOSPADM

## 2021-07-04 RX ORDER — ALPRAZOLAM 0.5 MG/1
0.5 TABLET ORAL 2 TIMES DAILY PRN
Status: DISCONTINUED | OUTPATIENT
Start: 2021-07-04 | End: 2021-07-05 | Stop reason: HOSPADM

## 2021-07-04 RX ORDER — HYDROMORPHONE HCL/PF 1 MG/ML
0.5 SYRINGE (ML) INJECTION ONCE
Status: COMPLETED | OUTPATIENT
Start: 2021-07-04 | End: 2021-07-04

## 2021-07-04 RX ORDER — HYDROMORPHONE HCL/PF 1 MG/ML
0.2 SYRINGE (ML) INJECTION ONCE
Status: COMPLETED | OUTPATIENT
Start: 2021-07-04 | End: 2021-07-04

## 2021-07-04 RX ORDER — PANTOPRAZOLE SODIUM 40 MG/1
40 INJECTION, POWDER, FOR SOLUTION INTRAVENOUS EVERY 12 HOURS SCHEDULED
Status: DISCONTINUED | OUTPATIENT
Start: 2021-07-04 | End: 2021-07-05 | Stop reason: HOSPADM

## 2021-07-04 RX ORDER — LISINOPRIL 10 MG/1
10 TABLET ORAL DAILY
Status: DISCONTINUED | OUTPATIENT
Start: 2021-07-04 | End: 2021-07-05 | Stop reason: HOSPADM

## 2021-07-04 RX ORDER — SODIUM CHLORIDE 9 MG/ML
125 INJECTION, SOLUTION INTRAVENOUS CONTINUOUS
Status: DISPENSED | OUTPATIENT
Start: 2021-07-04 | End: 2021-07-05

## 2021-07-04 RX ORDER — METOCLOPRAMIDE 10 MG/1
10 TABLET ORAL
Status: DISCONTINUED | OUTPATIENT
Start: 2021-07-04 | End: 2021-07-05 | Stop reason: HOSPADM

## 2021-07-04 RX ORDER — METOCLOPRAMIDE HYDROCHLORIDE 5 MG/ML
10 INJECTION INTRAMUSCULAR; INTRAVENOUS ONCE
Status: COMPLETED | OUTPATIENT
Start: 2021-07-04 | End: 2021-07-04

## 2021-07-04 RX ORDER — ONDANSETRON 2 MG/ML
4 INJECTION INTRAMUSCULAR; INTRAVENOUS ONCE
Status: COMPLETED | OUTPATIENT
Start: 2021-07-04 | End: 2021-07-04

## 2021-07-04 RX ORDER — ONDANSETRON 2 MG/ML
4 INJECTION INTRAMUSCULAR; INTRAVENOUS EVERY 4 HOURS PRN
Status: DISCONTINUED | OUTPATIENT
Start: 2021-07-04 | End: 2021-07-05 | Stop reason: HOSPADM

## 2021-07-04 RX ADMIN — LISINOPRIL 10 MG: 10 TABLET ORAL at 15:18

## 2021-07-04 RX ADMIN — IOHEXOL 100 ML: 350 INJECTION, SOLUTION INTRAVENOUS at 09:51

## 2021-07-04 RX ADMIN — HYDROMORPHONE HYDROCHLORIDE 0.5 MG: 1 INJECTION, SOLUTION INTRAMUSCULAR; INTRAVENOUS; SUBCUTANEOUS at 08:48

## 2021-07-04 RX ADMIN — METOCLOPRAMIDE 10 MG: 5 INJECTION, SOLUTION INTRAMUSCULAR; INTRAVENOUS at 08:48

## 2021-07-04 RX ADMIN — INSULIN LISPRO 2 UNITS: 100 INJECTION, SOLUTION INTRAVENOUS; SUBCUTANEOUS at 18:05

## 2021-07-04 RX ADMIN — PANTOPRAZOLE SODIUM 40 MG: 40 INJECTION, POWDER, FOR SOLUTION INTRAVENOUS at 15:18

## 2021-07-04 RX ADMIN — HYDROMORPHONE HYDROCHLORIDE 0.2 MG: 1 INJECTION, SOLUTION INTRAMUSCULAR; INTRAVENOUS; SUBCUTANEOUS at 12:46

## 2021-07-04 RX ADMIN — METOCLOPRAMIDE 10 MG: 10 TABLET ORAL at 15:18

## 2021-07-04 RX ADMIN — SODIUM CHLORIDE 125 ML/HR: 0.9 INJECTION, SOLUTION INTRAVENOUS at 15:17

## 2021-07-04 RX ADMIN — ONDANSETRON 4 MG: 2 INJECTION INTRAMUSCULAR; INTRAVENOUS at 12:46

## 2021-07-04 RX ADMIN — INSULIN LISPRO 2 UNITS: 100 INJECTION, SOLUTION INTRAVENOUS; SUBCUTANEOUS at 18:06

## 2021-07-04 RX ADMIN — PANTOPRAZOLE SODIUM 40 MG: 40 INJECTION, POWDER, FOR SOLUTION INTRAVENOUS at 21:42

## 2021-07-04 RX ADMIN — SODIUM CHLORIDE 1000 ML: 0.9 INJECTION, SOLUTION INTRAVENOUS at 08:48

## 2021-07-04 RX ADMIN — INSULIN GLARGINE 32 UNITS: 100 INJECTION, SOLUTION SUBCUTANEOUS at 21:42

## 2021-07-04 NOTE — H&P
[address] [facility] 1965, 60 y o  male MRN: [id number]  Unit/Bed#: S -01 Encounter: [id number]  Primary Care Provider: Barbara Horn DO   Date and time admitted to hospital: 7/4/2021  8:19 AM    * Intractable vomiting with nausea  Assessment & Plan  - patient presents with a five-day history of nausea, vomiting and abdominal pain in which she has not been able to tolerate p o  As well as has not been taking his insulin since he has been vomiting  - this likely represents gastroparesis from uncontrolled type 2 diabetes, he was not taking his Reglan her Protonix prescribed by his PCP, he has never had a gastric emptying study  - will give scheduled Reglan, diabetic full liquids and advanced diet as tolerated, will consult Gastroenterology, consideration for gastric emptying study inpatient versus outpatient  - also provided with IV fluids and IV Zofran as needed for nausea and vomiting    Uncontrolled type 2 diabetes mellitus with foot ulcer, without long-term current use of insulin ([facility])  Assessment & Plan  Lab Results   Component Value Date    HGBA1C 9 7 (H) 03/29/2021       Recent Labs     07/04/21  0823   POCGLU 304*       Blood Sugar Average: Last 72 hrs:  (P) 304   - patient has uncontrolled diabetes he usually runs in the 300s, he is prescribed 32 units of Basaglar twice a day, will switch to Lantus 32 units b i d   Due to hospital formulary  - had sliding scale algorithm 3 for mealtime and bedtime insulin coverage, diabetic diet  - unsure if he is taking his insulin correctly as he reports he was taking 32 units of Humalog and was uncertain of the name of the medication but per chart review he is supposed to be taking Basaglar 32 units twice a day  - hold metformin while in the hospital and acute vomiting    Essential hypertension  Assessment & Plan  - blood pressure elevated likely secondary to dehydration, will provide with IV fluids and continue home lisinopril 10 mg      VTE Prophylaxis: Enoxaparin (Lovenox)  / sequential compression device   Code Status: level 1 full code   Discussion with family: discussed plan with wife at bedside     Anticipated Length of Stay:  Patient will be admitted on an Observation basis with an anticipated length of stay of  < 2 midnights  Justification for Hospital Stay:  Intractable vomiting with dehydration requiring IV fluids and IV anti emetics and GI consult    Total Time for Visit, including Counseling / Coordination of Care: 30 minutes  Greater than 50% of this total time spent on direct patient counseling and coordination of care  Chief Complaint:   vomitting    History of Present Illness:    Viridiana Tanner is a 60 y o  male with past medical history of insulin dependent uncontrolled type 2 diabetes who presents with 5 day history of vomiting  He reports vomiting with associated abdominal pain that is non bilious and non bloody  He reports that initially he had epigastric pain but now it is generalized  He was prescribed Protonix which he discontinued  He also is not taking his statin medication  He denies any associated diarrhea or fever or recent illness  He reports that he has had these episodes of vomiting in the past and was prescribed Protonix and Reglan which he is not taking  Review of Systems:    Review of Systems   Constitutional: Negative for chills and fever  HENT: Negative for ear pain and sore throat  Eyes: Negative for pain and visual disturbance  Respiratory: Negative for cough and shortness of breath  Cardiovascular: Negative for chest pain and palpitations  Gastrointestinal: Positive for abdominal pain, nausea and vomiting  Negative for diarrhea  Genitourinary: Negative for dysuria and hematuria  Musculoskeletal: Negative for arthralgias and back pain  Skin: Negative for color change and rash  Neurological: Negative for seizures, syncope and headaches     All other systems reviewed and are negative  Past Medical and Surgical History:     Past Medical History:   Diagnosis Date    Diabetes mellitus ([facility] [address] )     Hypertension     Neuropathy        Past Surgical History:   Procedure Laterality Date    [place] AMPUTATION FOOT,TRANSMETATARSAL Left 10/30/2017    Procedure: AMPUTATION TRANSMETATARSAL (TMA);  Surgeon: Enrique Bolden DPM;  [facility];  Service: Podiatry    [address] [place], [place] [address]Compact Particle Acceleration Left 11/3/2017    Procedure: AMPUTATION TRANSMETATARSAL (TMA), left foot;  Surgeon: Enrique Bolden DPM;  [facility];  Service: Podiatry       Meds/Allergies:    Prior to Admission medications    Medication Sig Start Date End Date Taking?  Authorizing Provider   ALPRAZolam Jannette Theodore) 0 5 mg tablet Take 1 tablet by mouth 2 (two) times a day as needed for anxiety for up to 10 days 11/7/17 7/4/21 Yes Horacio Carrasco MD   lisinopril (ZESTRIL) 10 mg tablet Take 10 mg by mouth daily   Yes Historical Provider, MD   metFORMIN (GLUCOPHAGE-XR) 750 mg 24 hr tablet Take 750 mg by mouth daily with breakfast   Yes Historical Provider, MD   pantoprazole (PROTONIX) 40 mg tablet Take 40 mg by mouth daily   Yes Historical Provider, MD   atorvastatin (LIPITOR) 20 mg tablet Take 20 mg by mouth daily  Patient not taking: Reported on 7/4/2021    Historical Provider, MD   insulin lispro (HUMALOG KWIKPEN) 100 Units/mL SOPN Inject 15 Units under the skin 3 (three) times a day with meals  Patient taking differently: Inject 32 Units under the skin 2 (two) times a day  11/7/17   Horacio Carrasco MD   metoclopramide (REGLAN) 10 mg tablet Take 1 tablet (10 mg total) by mouth 3 (three) times a day as needed (nausea/ vomiting) As needed for nausea  Patient not taking: Reported on 7/4/2021 3/14/19   Mercy Phelps MD   ondansetron (ZOFRAN-ODT) 4 mg disintegrating tablet Take 1 tablet (4 mg total) by mouth every 8 (eight) hours as needed for nausea or vomiting for up to 15 doses 12/13/18 3/14/19  Vincent Juarez DO     I have reviewed home medications with patient personally  Allergies: No Known Allergies    Social History:     Marital Status: /Civil Union   Patient Pre-hospital Living Situation: lives at home  Patient Pre-hospital Level of Mobility: ambulates without assistance   Patient Pre-hospital Diet Restrictions: none   Substance Use History:   Social History     Substance and Sexual Activity   Alcohol Use No     Social History     Tobacco Use   Smoking Status Current Some Day Smoker    Types: Cigars   Smokeless Tobacco Never Used     Social History     Substance and Sexual Activity   Drug Use No         Physical Exam  Constitutional:       Appearance: Normal appearance  He is ill-appearing  He is not diaphoretic  HENT:      Head: Normocephalic and atraumatic  Mouth/Throat:      Mouth: Mucous membranes are dry  Eyes:      General:         Right eye: No discharge  Left eye: No discharge  Conjunctiva/sclera: Conjunctivae normal    Cardiovascular:      Rate and Rhythm: Normal rate and regular rhythm  Pulses: Normal pulses  Heart sounds: No murmur heard  Pulmonary:      Effort: Pulmonary effort is normal  No respiratory distress  Breath sounds: No wheezing or rales  Abdominal:      General: Bowel sounds are normal  There is no distension  Palpations: Abdomen is soft  Tenderness: There is no abdominal tenderness  Musculoskeletal:      Right lower leg: No edema  Left lower leg: No edema  Skin:     Capillary Refill: Capillary refill takes less than 2 seconds  Neurological:      General: No focal deficit present     Psychiatric:         Mood and Affect: Mood normal          Behavior: Behavior normal

## 2021-07-04 NOTE — ED PROVIDER NOTES
History  Chief Complaint   Patient presents with    Abdominal Pain     per pt  "" he is a diabetic and has been having some N/V, no D  The patient is a 12-year-old male with history of diabetes, gastroparesis and hypertension who presents to the emergency department for evaluation of abdominal pain and vomiting that has been going on for 5 days  The patient states that the abdominal pain feels consistent with his gastroparesis that he has had multiple times previously  He states he tried to take 1 pill at home that he had for nausea that did somewhat help, however he then continued to vomit  He states he is on insulin and metformin for his diabetes  He states that he is generally compliant with his medications, although he has not been taking his insulin over the last few days because he said he has not had anything to eat  He is also having trouble keeping down fluids  He has not taken anything for pain  He additionally reports generalized weakness, dizziness and shortness of breath  Per the wise, she had trouble even getting him out to the car because he was so weak and short of breath  He denies any associated chest pain  The patient also denies fever, chills, diarrhea, constipation, dysuria or headache  History provided by:  Patient   used: No    Abdominal Pain  Associated symptoms: nausea, shortness of breath and vomiting    Associated symptoms: no chest pain, no chills, no cough, no diarrhea, no dysuria, no fever, no hematuria and no sore throat        Prior to Admission Medications   Prescriptions Last Dose Informant Patient Reported? Taking?    ALPRAZolam (XANAX) 0 5 mg tablet Past Week at Unknown time  No Yes   Sig: Take 1 tablet by mouth 2 (two) times a day as needed for anxiety for up to 10 days   atorvastatin (LIPITOR) 20 mg tablet Not Taking at Unknown time  Yes No   Sig: Take 20 mg by mouth daily   Patient not taking: Reported on 7/4/2021   insulin lispro (HUMALOG KWIKPEN) 100 Units/mL SOPN   No No   Sig: Inject 15 Units under the skin 3 (three) times a day with meals   Patient taking differently: Inject 32 Units under the skin 2 (two) times a day    lisinopril (ZESTRIL) 10 mg tablet 7/4/2021 at Unknown time  Yes Yes   Sig: Take 10 mg by mouth daily   metFORMIN (GLUCOPHAGE-XR) 750 mg 24 hr tablet 7/4/2021 at Unknown time  Yes Yes   Sig: Take 750 mg by mouth daily with breakfast   metoclopramide (REGLAN) 10 mg tablet Not Taking at Unknown time  No No   Sig: Take 1 tablet (10 mg total) by mouth 3 (three) times a day as needed (nausea/ vomiting) As needed for nausea   Patient not taking: Reported on 7/4/2021   pantoprazole (PROTONIX) 40 mg tablet 7/3/2021 at Unknown time  Yes Yes   Sig: Take 40 mg by mouth daily      Facility-Administered Medications: None       Past Medical History:   Diagnosis Date    Diabetes mellitus ([facility] [address] )     Hypertension     Neuropathy        Past Surgical History:   Procedure Laterality Date    [place] AMPUTATION FOOT,TRANSMETATARSAL Left 10/30/2017    Procedure: AMPUTATION TRANSMETATARSAL (TMA);  Surgeon: Dani Farnsworth DPM;  [facility];  Service: Podiatry    [address] [place] [address] Left 11/3/2017    Procedure: AMPUTATION TRANSMETATARSAL (TMA), left foot;  Surgeon: Dani Farnsworth DPM;  [facility];  Service: Podiatry       History reviewed  No pertinent family history  I have reviewed and agree with the history as documented  E-Cigarette/Vaping     E-Cigarette/Vaping Substances     Social History     Tobacco Use    Smoking status: Current Some Day Smoker     Types: Cigars    Smokeless tobacco: Never Used   Substance Use Topics    Alcohol use: No    Drug use: No       Review of Systems   Constitutional: Negative for chills and fever  HENT: Negative for ear pain and sore throat  Eyes: Negative for redness and visual disturbance  Respiratory: Positive for shortness of breath   Negative for cough and wheezing  Cardiovascular: Negative for chest pain  Gastrointestinal: Positive for abdominal pain, nausea and vomiting  Negative for diarrhea  Genitourinary: Negative for dysuria and hematuria  Musculoskeletal: Negative for back pain, neck pain and neck stiffness  Skin: Negative for color change and rash  Neurological: Positive for dizziness and weakness  Negative for light-headedness and headaches  All other systems reviewed and are negative  Physical Exam  Physical Exam  Vitals and nursing note reviewed  Constitutional:       General: He is in acute distress  Appearance: He is well-developed  He is ill-appearing  He is not toxic-appearing  Comments: Patient is in moderate distress due to pain  HENT:      Head: Normocephalic and atraumatic  Eyes:      Pupils: Pupils are equal, round, and reactive to light  Cardiovascular:      Rate and Rhythm: Normal rate and regular rhythm  Heart sounds: Normal heart sounds  Pulmonary:      Effort: Pulmonary effort is normal       Breath sounds: Normal breath sounds  Abdominal:      General: There is no distension  Palpations: Abdomen is soft  Tenderness: There is abdominal tenderness in the epigastric area  There is no guarding or rebound  Musculoskeletal:      Cervical back: Normal range of motion and neck supple  Skin:     General: Skin is warm and dry  Neurological:      Mental Status: He is alert and oriented to person, place, and time  Cranial Nerves: Cranial nerves are intact  Sensory: Sensation is intact  Motor: Motor function is intact  Coordination: Coordination is intact           Vital Signs  ED Triage Vitals   Temperature Pulse Respirations Blood Pressure SpO2   07/04/21 0822 07/04/21 0822 07/04/21 0822 07/04/21 0822 07/04/21 0822   97 9 °F (36 6 °C) 83 20 (!) 186/106 99 %      Temp Source Heart Rate Source Patient Position - Orthostatic VS BP Location FiO2 (%)   07/04/21 0822 07/04/21 2982 07/04/21 0822 07/04/21 0822 --   Oral Monitor Lying Right arm       Pain Score       07/04/21 0848       Worst Possible Pain           Vitals:    07/04/21 0822 07/04/21 0900 07/04/21 1311 07/04/21 1315   BP: (!) 186/106 (!) 192/103 141/83 141/83   Pulse: 83 86 90 90   Patient Position - Orthostatic VS: Lying  Lying          Visual Acuity      ED Medications  Medications   sodium chloride 0 9 % bolus 1,000 mL (0 mL Intravenous Stopped 7/4/21 0948)   metoclopramide (REGLAN) injection 10 mg (10 mg Intravenous Given 7/4/21 0848)   HYDROmorphone (DILAUDID) injection 0 5 mg (0 5 mg Intravenous Given 7/4/21 0848)   iohexol (OMNIPAQUE) 350 MG/ML injection (SINGLE-DOSE) 100 mL (100 mL Intravenous Given 7/4/21 0951)   ondansetron (ZOFRAN) injection 4 mg (4 mg Intravenous Given 7/4/21 1246)   HYDROmorphone (DILAUDID) injection 0 2 mg (0 2 mg Intravenous Given 7/4/21 1246)       Diagnostic Studies  Results Reviewed     Procedure Component Value Units Date/Time    Urine Microscopic [[id number]]  (Abnormal) Collected: 07/04/21 1314    Lab Status: Final result Specimen: Urine, Other Updated: 07/04/21 1346     RBC, UA 0-5 /hpf      WBC, UA 20-30 /hpf      Epithelial Cells Occasional /hpf      Bacteria, UA Occasional /hpf     Urine culture [[id number]] Collected: 07/04/21 1314    Lab Status:  In process Specimen: Urine, Other Updated: 07/04/21 1346    Urine Macroscopic, POC [[id number]]  (Abnormal) Collected: 07/04/21 1314    Lab Status: Final result Specimen: Urine Updated: 07/04/21 1315     Color, UA Yellow     Clarity, UA Clear     pH, UA 5 5     Leukocytes, UA Negative     Nitrite, UA Positive     Protein, UA Negative mg/dl      Glucose,  (1/2%) mg/dl      Ketones, UA 80 (3+) mg/dl      Urobilinogen, UA 0 2 E U /dl      Bilirubin, UA Negative     Blood, UA Trace     Specific [place], UA 1 010    Narrative:      CLINITEK RESULT    Comprehensive metabolic panel [[id number]]  (Abnormal) Collected: 07/04/21 3178    Lab Status: Final result Specimen: Blood from Arm, Left Updated: 07/04/21 0931     Sodium 127 mmol/L      Potassium 5 0 mmol/L      Chloride 94 mmol/L      CO2 21 mmol/L      ANION GAP 12 mmol/L      BUN 19 mg/dL      Creatinine 1 01 mg/dL      Glucose 302 mg/dL      Calcium 8 9 mg/dL      AST 33 U/L      ALT 32 U/L      Alkaline Phosphatase 85 U/L      Total Protein 7 2 g/dL      Albumin 3 7 g/dL      Total Bilirubin 2 59 mg/dL      eGFR 82 ml/min/1 73sq m     Narrative:      [place] guidelines for Chronic Kidney Disease (CKD):     Stage 1 with normal or high GFR (GFR > 90 mL/min/1 73 square meters)    Stage 2 Mild CKD (GFR = 60-89 mL/min/1 73 square meters)    Stage 3A Moderate CKD (GFR = 45-59 mL/min/1 73 square meters)    Stage 3B Moderate CKD (GFR = 30-44 mL/min/1 73 square meters)    Stage 4 Severe CKD (GFR = 15-29 mL/min/1 73 square meters)    Stage 5 End Stage CKD (GFR <15 mL/min/1 73 square meters)  Note: GFR calculation is accurate only with a steady state creatinine    Troponin I [[id number]]  (Normal) Collected: 07/04/21 0837    Lab Status: Final result Specimen: Blood from Arm, Left Updated: 07/04/21 0924     Troponin I <0 02 ng/mL     Lipase [[id number]]  (Normal) Collected: 07/04/21 0837    Lab Status: Final result Specimen: Blood from Arm, Left Updated: 07/04/21 0923     Lipase 212 u/L     Beta Hydroxybutyrate [[id number]]  (Abnormal) Collected: 07/04/21 0837    Lab Status: Final result Specimen: Blood from Arm, Left Updated: 07/04/21 0859     BETA-HYDROXYBUTYRATE 0 8 mmol/L     CBC and differential [[id number]]  (Abnormal) Collected: 07/04/21 0837    Lab Status: Final result Specimen: Blood from Arm, Left Updated: 07/04/21 0858     WBC 10 95 Thousand/uL      RBC 6 00 Million/uL      Hemoglobin 17 6 g/dL      Hematocrit 49 2 %      MCV 82 fL      MCH 29 3 pg      MCHC 35 8 g/dL      RDW 11 9 %      MPV 9 4 fL      Platelets 367 Thousands/uL      nRBC 0 /100 WBCs      Neutrophils Relative 69 %      Immat GRANS % 1 %      Lymphocytes Relative 23 %      Monocytes Relative 6 %      Eosinophils Relative 0 %      Basophils Relative 1 %      Neutrophils Absolute 7 57 Thousands/µL      Immature Grans Absolute 0 05 Thousand/uL      Lymphocytes Absolute 2 56 Thousands/µL      Monocytes Absolute 0 67 Thousand/µL      Eosinophils Absolute 0 04 Thousand/µL      Basophils Absolute 0 06 Thousands/µL     POCT Blood Gas (CG8+) [[id number]]  (Abnormal) Collected: 07/04/21 0845    Lab Status: Final result Specimen: Venous Updated: 07/04/21 0849     ph, Eddy ISTAT 7 595     pCO2, Eddy i-STAT 25 2 mm HG      pO2, Eddy i-STAT 40 0 mm HG      BE, i-STAT 4 mmol/L      HCO3, Eddy i-STAT 24 4 mmol/L      CO2, i-STAT 25 mmol/L      O2 Sat, i-STAT 85 %      SODIUM, I-STAT 132 mmol/l      Potassium, i-STAT 3 7 mmol/L      Hct, i-STAT 49 %      Hgb, i-STAT 16 7 g/dl      Glucose, i-STAT 314 mg/dl      Specimen Type VENOUS    Fingerstick Glucose (POCT) [[id number]]  (Abnormal) Collected: 07/04/21 0823    Lab Status: Final result Updated: 07/04/21 0825     POC Glucose 304 mg/dl                  CT abdomen pelvis with contrast   Final Result by Shaheen Caruso MD (07/04 1140)      Mild thickening of the ascending colon likely underdistention  Consider segmental colitis in the appropriate clinical context  Otherwise, no evidence of acute abdominopelvic process  Colonic diverticulosis  Workstation performed: [id number]         XR chest 1 view portable    (Results Pending)              Procedures  Procedures         ED Course  ED Course as of Jul 04 1415   Sun Jul 04, 2021   0839 POC Glucose(!): 304   0851 ph, Darian Floress(!!): 7 595   0939 Anion Gap: 12   0939 Corrected sodium is 132  Sodium(!): 127   [id number] Patient is resting comfortably after the medications  Patient went for CT scan at this time   Patient is now reporting that pain and nausea is returning    Will admit for intractable nausea and vomiting as well as dehydration  Patient is agreeable to plan  SBIRT 20yo+      Most Recent Value   SBIRT (24 yo +)   In order to provide better care to our patients, we are screening all of our patients for alcohol and drug use  Would it be okay to ask you these screening questions? Yes Filed at: 07/04/2021 3353   Initial Alcohol Screen: US AUDIT-C    1  How often do you have a drink containing alcohol?  0 Filed at: 07/04/2021 0906   2  How many drinks containing alcohol do you have on a typical day you are drinking? 0 Filed at: 07/04/2021 0906   3a  Male UNDER 65: How often do you have five or more drinks on one occasion? 0 Filed at: 07/04/2021 0906   3b  FEMALE Any Age, or MALE 65+: How often do you have 4 or more drinks on one occassion? 0 Filed at: 07/04/2021 0906   Audit-C Score  0 Filed at: 07/04/2021 6720   IMELDA: How many times in the past year have you    Used an illegal drug or used a prescription medication for non-medical reasons? Never Filed at: 07/04/2021 0906                    MDM  Number of Diagnoses or Management Options  Dehydration: new and requires workup  Elevated beta-hydroxybutyrate: new and requires workup  Epigastric pain: new and requires workup  Hyperglycemia: new and requires workup  Nausea and vomiting: new and requires workup  Diagnosis management comments: Patient presents for evaluation abdominal pain, nausea and vomiting for the last 5 days  Patient has extensive history of gastroparesis and uncontrolled diabetes  Differential includes but is not limited to DKA versus gastroparesis versus gastritis versus pancreatitis versus PUD versus cholecystitis  Labs and imaging ordered  Labs reviewed  Elevated beta hydroxybutyrate, however the patient is not acidotic  Blood gas is more consistent respiratory alkalosis  There is no anion gap  Sodium corrects to 130  Labs are consistent with dehydration      CT was reviewed with no acute findings  It does mention that there could be a possible area consistent with colitis, however the patient is not having any diarrhea  His pain is epigastric  I feel that his abdominal pain is more consistent with his gastroparesis  Patient was initially given Reglan and Dilaudid which did give him some relief  However, he then developed nausea and pain again  A 2nd round of medications was ordered  The decision was ultimately made to admit the patient for nausea and vomiting as well as dehydration  He is agreeable  Case was discussed with AL who agreed to accept the patient under the service of Dr Velia Salmeron  Patient is stable for admission         Amount and/or Complexity of Data Reviewed  Clinical lab tests: ordered and reviewed  Tests in the radiology section of CPT®: ordered and reviewed  Decide to obtain previous medical records or to obtain history from someone other than the patient: yes  Review and summarize past medical records: yes  Discuss the patient with other providers: yes    Risk of Complications, Morbidity, and/or Mortality  Presenting problems: moderate  Diagnostic procedures: moderate  Management options: moderate    Patient Progress  Patient progress: stable      Disposition  Final diagnoses:   Nausea and vomiting   Dehydration   Epigastric pain   Hyperglycemia   Elevated beta-hydroxybutyrate     Time reflects when diagnosis was documented in both MDM as applicable and the Disposition within this note     Time User Action Codes Description Comment    7/4/2021 12:02 PM Lindsay Peralta Add [R11 2] Nausea and vomiting     7/4/2021 12:02 PM Maciel Ruth [id number] [address] Add [E86 0] Dehydration     7/4/2021 12:02 PM Lindsay Peralta Add [R10 13] Epigastric pain     7/4/2021 12:02 PM Lindsay Peralta Add [R73 9] Hyperglycemia     7/4/2021 12:02 PM Remona Score Add [R78 89] Elevated beta-hydroxybutyrate       ED Disposition     ED Disposition Condition Date/Time Comment    Admit Stable Magdifredo Olszewski Jul 4, 2021 12:02 PM Case was discussed with AL and the patient's admission status was agreed to be Admission Status: observation status to the service of Dr Maribel Peña   Follow-up Information    None         Current Discharge Medication List      CONTINUE these medications which have NOT CHANGED    Details   ALPRAZolam (XANAX) 0 5 mg tablet Take 1 tablet by mouth 2 (two) times a day as needed for anxiety for up to 10 days  Qty: 30 tablet, Refills: 0      lisinopril (ZESTRIL) 10 mg tablet Take 10 mg by mouth daily      metFORMIN (GLUCOPHAGE-XR) 750 mg 24 hr tablet Take 750 mg by mouth daily with breakfast      pantoprazole (PROTONIX) 40 mg tablet Take 40 mg by mouth daily      atorvastatin (LIPITOR) 20 mg tablet Take 20 mg by mouth daily      insulin lispro (HUMALOG KWIKPEN) 100 Units/mL SOPN Inject 15 Units under the skin 3 (three) times a day with meals  Qty: 13 pen, Refills: 0      metoclopramide (REGLAN) 10 mg tablet Take 1 tablet (10 mg total) by mouth 3 (three) times a day as needed (nausea/ vomiting) As needed for nausea  Qty: 30 tablet, Refills: 12    Associated Diagnoses: Nausea and vomiting, intractability of vomiting not specified, unspecified vomiting type           No discharge procedures on file      PDMP Review     None          ED Provider  Electronically Signed by           Keena Houser PA-C  07/04/21 3562

## 2021-07-04 NOTE — ASSESSMENT & PLAN NOTE
- blood pressure elevated likely secondary to dehydration, will provide with IV fluids and continue home lisinopril 10 mg

## 2021-07-04 NOTE — ASSESSMENT & PLAN NOTE
- patient presents with a five-day history of nausea, vomiting and abdominal pain in which she has not been able to tolerate p o   As well as has not been taking his insulin since he has been vomiting  - this likely represents gastroparesis from uncontrolled type 2 diabetes, he was not taking his Reglan her Protonix prescribed by his PCP, he has never had a gastric emptying study  - will give scheduled Reglan, diabetic full liquids and advanced diet as tolerated, will consult Gastroenterology, consideration for gastric emptying study inpatient versus outpatient  - also provided with IV fluids and IV Zofran as needed for nausea and vomiting

## 2021-07-04 NOTE — ASSESSMENT & PLAN NOTE
Lab Results   Component Value Date    HGBA1C 9 7 (H) 03/29/2021       Recent Labs     07/04/21  0823   POCGLU 304*       Blood Sugar Average: Last 72 hrs:  (P) 304   - patient has uncontrolled diabetes he usually runs in the 300s, he is prescribed 32 units of Basaglar twice a day, will switch to Lantus 32 units b i d   Due to hospital formulary  - had sliding scale algorithm 3 for mealtime and bedtime insulin coverage, diabetic diet  - unsure if he is taking his insulin correctly as he reports he was taking 32 units of Humalog and was uncertain of the name of the medication but per chart review he is supposed to be taking Basaglar 32 units twice a day  - hold metformin while in the hospital and acute vomiting

## 2021-07-05 VITALS
RESPIRATION RATE: 18 BRPM | HEART RATE: 98 BPM | TEMPERATURE: 98.8 F | SYSTOLIC BLOOD PRESSURE: 140 MMHG | OXYGEN SATURATION: 98 % | DIASTOLIC BLOOD PRESSURE: 75 MMHG

## 2021-07-05 PROBLEM — E87.1 HYPONATREMIA: Status: RESOLVED | Noted: 2017-10-31 | Resolved: 2021-07-05

## 2021-07-05 PROBLEM — R11.2 INTRACTABLE VOMITING WITH NAUSEA: Status: RESOLVED | Noted: 2021-07-04 | Resolved: 2021-07-05

## 2021-07-05 LAB
ALBUMIN SERPL BCP-MCNC: 3.1 G/DL (ref 3.5–5)
ALP SERPL-CCNC: 71 U/L (ref 46–116)
ALT SERPL W P-5'-P-CCNC: 25 U/L (ref 12–78)
ANION GAP SERPL CALCULATED.3IONS-SCNC: 8 MMOL/L (ref 4–13)
AST SERPL W P-5'-P-CCNC: 6 U/L (ref 5–45)
BASOPHILS # BLD AUTO: 0.04 THOUSANDS/ΜL (ref 0–0.1)
BASOPHILS NFR BLD AUTO: 0 % (ref 0–1)
BILIRUB DIRECT SERPL-MCNC: 0.29 MG/DL (ref 0–0.2)
BILIRUB SERPL-MCNC: 1.8 MG/DL (ref 0.2–1)
BUN SERPL-MCNC: 13 MG/DL (ref 5–25)
CALCIUM ALBUM COR SERPL-MCNC: 8.6 MG/DL (ref 8.3–10.1)
CALCIUM SERPL-MCNC: 7.9 MG/DL (ref 8.3–10.1)
CHLORIDE SERPL-SCNC: 102 MMOL/L (ref 100–108)
CO2 SERPL-SCNC: 26 MMOL/L (ref 21–32)
CREAT SERPL-MCNC: 0.94 MG/DL (ref 0.6–1.3)
EOSINOPHIL # BLD AUTO: 0.09 THOUSAND/ΜL (ref 0–0.61)
EOSINOPHIL NFR BLD AUTO: 1 % (ref 0–6)
ERYTHROCYTE [DISTWIDTH] IN BLOOD BY AUTOMATED COUNT: 11.9 % (ref 11.6–15.1)
GFR SERPL CREATININE-BSD FRML MDRD: 89 ML/MIN/1.73SQ M
GLUCOSE SERPL-MCNC: 176 MG/DL (ref 65–140)
GLUCOSE SERPL-MCNC: 180 MG/DL (ref 65–140)
GLUCOSE SERPL-MCNC: 180 MG/DL (ref 65–140)
GLUCOSE SERPL-MCNC: 222 MG/DL (ref 65–140)
GLUCOSE SERPL-MCNC: 291 MG/DL (ref 65–140)
HCT VFR BLD AUTO: 44.3 % (ref 36.5–49.3)
HGB BLD-MCNC: 15.4 G/DL (ref 12–17)
IMM GRANULOCYTES # BLD AUTO: 0.04 THOUSAND/UL (ref 0–0.2)
IMM GRANULOCYTES NFR BLD AUTO: 0 % (ref 0–2)
LYMPHOCYTES # BLD AUTO: 3.34 THOUSANDS/ΜL (ref 0.6–4.47)
LYMPHOCYTES NFR BLD AUTO: 33 % (ref 14–44)
MCH RBC QN AUTO: 28.7 PG (ref 26.8–34.3)
MCHC RBC AUTO-ENTMCNC: 34.8 G/DL (ref 31.4–37.4)
MCV RBC AUTO: 83 FL (ref 82–98)
MONOCYTES # BLD AUTO: 0.62 THOUSAND/ΜL (ref 0.17–1.22)
MONOCYTES NFR BLD AUTO: 6 % (ref 4–12)
NEUTROPHILS # BLD AUTO: 6.01 THOUSANDS/ΜL (ref 1.85–7.62)
NEUTS SEG NFR BLD AUTO: 60 % (ref 43–75)
NRBC BLD AUTO-RTO: 0 /100 WBCS
PLATELET # BLD AUTO: 298 THOUSANDS/UL (ref 149–390)
PMV BLD AUTO: 9 FL (ref 8.9–12.7)
POTASSIUM SERPL-SCNC: 3.4 MMOL/L (ref 3.5–5.3)
PROT SERPL-MCNC: 5.9 G/DL (ref 6.4–8.2)
RBC # BLD AUTO: 5.37 MILLION/UL (ref 3.88–5.62)
SODIUM SERPL-SCNC: 136 MMOL/L (ref 136–145)
TSH SERPL DL<=0.05 MIU/L-ACNC: 3.13 UIU/ML (ref 0.36–3.74)
WBC # BLD AUTO: 10.14 THOUSAND/UL (ref 4.31–10.16)

## 2021-07-05 PROCEDURE — C9113 INJ PANTOPRAZOLE SODIUM, VIA: HCPCS | Performed by: FAMILY MEDICINE

## 2021-07-05 PROCEDURE — 80053 COMPREHEN METABOLIC PANEL: CPT | Performed by: INTERNAL MEDICINE

## 2021-07-05 PROCEDURE — 82248 BILIRUBIN DIRECT: CPT | Performed by: INTERNAL MEDICINE

## 2021-07-05 PROCEDURE — 82948 REAGENT STRIP/BLOOD GLUCOSE: CPT

## 2021-07-05 PROCEDURE — 99214 OFFICE O/P EST MOD 30 MIN: CPT | Performed by: INTERNAL MEDICINE

## 2021-07-05 PROCEDURE — 84443 ASSAY THYROID STIM HORMONE: CPT | Performed by: PHYSICIAN ASSISTANT

## 2021-07-05 PROCEDURE — 99217 PR OBSERVATION CARE DISCHARGE MANAGEMENT: CPT | Performed by: INTERNAL MEDICINE

## 2021-07-05 PROCEDURE — 85025 COMPLETE CBC W/AUTO DIFF WBC: CPT | Performed by: INTERNAL MEDICINE

## 2021-07-05 RX ORDER — POTASSIUM CHLORIDE 20 MEQ/1
40 TABLET, EXTENDED RELEASE ORAL ONCE
Status: COMPLETED | OUTPATIENT
Start: 2021-07-05 | End: 2021-07-05

## 2021-07-05 RX ORDER — ONDANSETRON 4 MG/1
4 TABLET, ORALLY DISINTEGRATING ORAL EVERY 6 HOURS PRN
Qty: 20 TABLET | Refills: 0 | Status: SHIPPED | OUTPATIENT
Start: 2021-07-05 | End: 2022-07-02

## 2021-07-05 RX ORDER — INSULIN LISPRO 100 [IU]/ML
32 INJECTION, SOLUTION INTRAVENOUS; SUBCUTANEOUS 2 TIMES DAILY WITH MEALS
Qty: 15 ML | Refills: 0
Start: 2021-07-05 | End: 2022-07-02

## 2021-07-05 RX ADMIN — INSULIN LISPRO 2 UNITS: 100 INJECTION, SOLUTION INTRAVENOUS; SUBCUTANEOUS at 11:12

## 2021-07-05 RX ADMIN — LISINOPRIL 10 MG: 10 TABLET ORAL at 08:36

## 2021-07-05 RX ADMIN — METOCLOPRAMIDE 10 MG: 10 TABLET ORAL at 11:15

## 2021-07-05 RX ADMIN — INSULIN LISPRO 1 UNITS: 100 INJECTION, SOLUTION INTRAVENOUS; SUBCUTANEOUS at 03:45

## 2021-07-05 RX ADMIN — INSULIN LISPRO 4 UNITS: 100 INJECTION, SOLUTION INTRAVENOUS; SUBCUTANEOUS at 14:11

## 2021-07-05 RX ADMIN — PANTOPRAZOLE SODIUM 40 MG: 40 INJECTION, POWDER, FOR SOLUTION INTRAVENOUS at 08:34

## 2021-07-05 RX ADMIN — METOCLOPRAMIDE 10 MG: 10 TABLET ORAL at 06:31

## 2021-07-05 RX ADMIN — INSULIN LISPRO 1 UNITS: 100 INJECTION, SOLUTION INTRAVENOUS; SUBCUTANEOUS at 08:32

## 2021-07-05 RX ADMIN — POTASSIUM CHLORIDE 40 MEQ: 1500 TABLET, EXTENDED RELEASE ORAL at 11:15

## 2021-07-05 NOTE — DISCHARGE INSTR - AVS FIRST PAGE
Dear Claudia Mccarthy,     It was our pleasure to care for you here at [facility] [place]  It is our hope that we were always able to exceed the expected standards for your care during your stay  You were hospitalized due to Nausea and Vomitting  You were cared for on the [facility] 3rd floor by Vanessa Anthony DO under the service of Leroy López MD with the [address] [place] [address] [facility] who covers for your primary care physician (PCP), Sarah Bright DO, while you were hospitalized  If you have any questions or concerns related to this hospitalization, you may contact us at [phone]  For follow up as well as any medication refills, we recommend that you follow up with your primary care physician  A registered nurse will reach out to you by phone within a few days after your discharge to answer any additional questions that you may have after going home  However, at this time we provide for you here, the most important instructions / recommendations at discharge:     · Notable Medication Adjustments -   · Zofran 4 mg tablets to be taken as needed for nausea  · Testing Required after Discharge -   · Recommend EGD/Colonoscopy by GI  · Important follow up information -   · Follow up with GI - ambulatory referral provided  · Other Instructions -   · Avoid fatty diet  · Please review this entire after visit summary as additional general instructions including medication list, appointments, activity, diet, any pertinent wound care, and other additional recommendations from your care team that may be provided for you        Sincerely,     Vanessa Anthony DO

## 2021-07-05 NOTE — ASSESSMENT & PLAN NOTE
- patient presents with a five-day history of nausea, vomiting and abdominal pain in which she has not been able to tolerate p o   As well as has not been taking his insulin since he has been vomiting  - this likely represents gastroparesis from uncontrolled type 2 diabetes, he was not taking his Reglan her Protonix prescribed by his PCP, he has never had a gastric emptying study    Plan:  · Gastroenterology consulted-input appreciated  · Will follow-up with outpatient Gastroenterology  · Right upper quadrant ultrasound to be obtained outpatient  · Emphasizing small frequent meals with low fat  · Workup with GI for EGD and colonoscopy and possibly gastric emptying study  · Recommend following up with outpatient Endocrinology as well as primary care physician  · Stable for discharge

## 2021-07-05 NOTE — ASSESSMENT & PLAN NOTE
Lab Results   Component Value Date    HGBA1C 9 7 (H) 03/29/2021       Recent Labs     07/05/21  0344 07/05/21  0736 07/05/21  1111 07/05/21  1409   POCGLU 180* 176* 222* 291*       Blood Sugar Average: Last 72 hrs:  (P) 235 [id number]     Continue home medication on discharge  32 units quick pen b i d   With meals  Recommend following up with outpatient Endocrinology/PCP due to uncontrolled A1c  Lifestyle modifications

## 2021-07-05 NOTE — DISCHARGE SUMMARY
[facility]  Discharge- No Seo 1965, 60 y o  male MRN: [id number]  Unit/Bed#: S -01 Encounter: [id number]  Primary Care Provider: Kadie Soliman DO   Date and time admitted to hospital: 7/4/2021  8:19 AM    * Intractable vomiting with nausea-resolved as of 7/5/2021  Assessment & Plan  - patient presents with a five-day history of nausea, vomiting and abdominal pain in which she has not been able to tolerate p o  As well as has not been taking his insulin since he has been vomiting  - this likely represents gastroparesis from uncontrolled type 2 diabetes, he was not taking his Reglan her Protonix prescribed by his PCP, he has never had a gastric emptying study    Plan:  · Gastroenterology consulted-input appreciated  · Will follow-up with outpatient Gastroenterology  · Right upper quadrant ultrasound to be obtained outpatient  · Emphasizing small frequent meals with low fat  · Workup with GI for EGD and colonoscopy and possibly gastric emptying study  · Recommend following up with outpatient Endocrinology as well as primary care physician  · Stable for discharge    Uncontrolled type 2 diabetes mellitus with foot ulcer, without long-term current use of insulin [facility])  Assessment & Plan  Lab Results   Component Value Date    HGBA1C 9 7 (H) 03/29/2021       Recent Labs     07/05/21  0344 07/05/21  0736 07/05/21  1111 07/05/21  1409   POCGLU 180* 176* 222* 291*       Blood Sugar Average: Last 72 hrs:  (P) 235 [id number]     Continue home medication on discharge  32 units quick pen b i d   With meals  Recommend following up with outpatient Endocrinology/PCP due to uncontrolled A1c  Lifestyle modifications    Essential hypertension  Assessment & Plan  Blood Pressure: 140/75    Continue home medications on discharge      Medical Problems     Resolved Problems  Date Reviewed: 7/5/2021        Resolved    Hyponatremia 7/5/2021     Resolved by  Princess Los DO    * (Principal) Intractable vomiting with nausea 7/5/2021     Resolved by  Teresita Maxwell DO              Discharging Resident: Teresita Maxwell DO  Discharging Attending: No att  providers found  PCP: Ayse Tamez DO  Admission Date:   Admission Orders (From admission, onward)     Ordered        07/04/21 1203  Place in Observation  Once                   Discharge Date: 07/05/21    Consultations During Hospital Stay:  · Gastroenterology    Procedures Performed:   · None    Significant Findings / Test Results:   XR chest 1 view portable    Result Date: 7/5/2021  Impression: No acute cardiopulmonary disease  Workstation performed: [id number]     CT abdomen pelvis with contrast    Result Date: 7/4/2021  Impression: Mild thickening of the ascending colon likely underdistention  Consider segmental colitis in the appropriate clinical context  Otherwise, no evidence of acute abdominopelvic process  Colonic diverticulosis  Workstation performed: [id number]       Incidental Findings:   · None     Test Results Pending at Discharge (will require follow up): · None     Outpatient Tests Requested:  · None    Complications:  None    Reason for Admission:  Intractable nausea and vomiting    Hospital Course:   Alexa Moore is a 60 y o  male patient who originally presented to the hospital on 7/4/2021 due to 5 day history of nausea and vomiting  Patient has a PMHx significant for insulin-dependent diabetes type 2, hypertension, hyperlipidemia  Patient had been previously having epigastric pain prior to admission but the pain became generalized throughout the abdomen  Previously prescribed Protonix which she stopped taking along with his statin medication  During prior episodes of nausea and vomiting, patient was prescribed Protonix and Reglan which did resolve the symptoms  However, patient had not been taking these medications currently  Patient presented to ED for further workup    CT abdomen showed results noted above  Overnight, patient did not have further episodes of nausea and vomiting  Patient was started on IV fluids and antiemetics  Gastroenterology was consulted diet was advanced as tolerated from full liquids to low fat low residue  Patient had never been previously had an EGD or colonoscopy and had not been following up with outpatient endocrinology, taking 32 units of insulin KwikPen b i d  With meals as opposed to 15 t i d  As prescribed  Patient deemed stable for discharge following morning and instructed to follow up with outpatient Gastroenterology for EGD colonoscopy workup along with gastric emptying study  Patient instructed to continue taking 32 units on discharge and follow up with outpatient PCP  He was prescribed 20 tablets of Zofran for nausea and vomiting  All questions were answered  Please see above list of diagnoses and related plan for additional information  Condition at Discharge: stable    Discharge Day Visit / Exam:   Subjective:  Stable for discharge today  No complaints  Sagrario Stager to go home  Vitals: Blood Pressure: 140/75 (07/05/21 0737)  Pulse: 98 (07/05/21 0737)  Temperature: 98 8 °F (37 1 °C) (07/05/21 0737)  Temp Source: Oral (07/05/21 0737)  Respirations: 18 (07/05/21 0737)  SpO2: 98 % (07/05/21 0737)  Exam:   Physical Exam  Vitals and nursing note reviewed  Constitutional:       General: He is not in acute distress  Appearance: He is well-developed  He is not diaphoretic  HENT:      Head: Normocephalic and atraumatic  Nose: Nose normal    Eyes:      General: No scleral icterus  Conjunctiva/sclera: Conjunctivae normal       Pupils: Pupils are equal, round, and reactive to light  Neck:      Thyroid: No thyromegaly  Cardiovascular:      Rate and Rhythm: Normal rate and regular rhythm  Heart sounds: Normal heart sounds  No murmur heard  No friction rub  No gallop      Pulmonary:      Effort: Pulmonary effort is normal  No respiratory distress  Breath sounds: Normal breath sounds  No stridor  No wheezing or rales  Abdominal:      General: Bowel sounds are normal  There is no distension  Palpations: Abdomen is soft  There is no mass  Tenderness: There is no abdominal tenderness  Musculoskeletal:         General: No deformity  Normal range of motion  Cervical back: Normal range of motion and neck supple  Skin:     General: Skin is warm and dry  Neurological:      Mental Status: He is alert and oriented to person, place, and time  Psychiatric:         Mood and Affect: Mood normal          Behavior: Behavior normal          Thought Content: Thought content normal          Judgment: Judgment normal           Discussion with Family: Patient declined call to   Discharge instructions/Information to patient and family:   See after visit summary for information provided to patient and family  Provisions for Follow-Up Care:  See after visit summary for information related to follow-up care and any pertinent home health orders  Disposition:   Home    Planned Readmission:  None    Discharge Medications:  See after visit summary for reconciled discharge medications provided to patient and/or family        **Please Note: This note may have been constructed using a voice recognition system**

## 2021-07-05 NOTE — CONSULTS
Consultation - [facility]  Jesica Malgorzata Mckeonreck 60 y o  male MRN: [id number]  Unit/Bed#: S -01 Encounter: [id number]        Inpatient consult to gastroenterology  Consult performed by: Zahida Thomas PA-C  Consult ordered by: Raffaele Paredes DO          Reason for Consult / Principal Problem: Intractable vomiting with nausea    HPI: Jessi Aguilar is a 60y o  year old male with history of poorly controlled insulin-dependent diabetes with neuropathy, reported history of gastroparesis who presented to the emergency room yesterday morning with complaints of abdominal pain, nausea and vomiting which have been going on for about 5 days  Patient said this felt consistent with symptoms he experienced with exacerbations of his gastroparesis many times in the past   He said he had not taken his insulin over the last few days because he had not had anything to eat  Reglan and Dilaudid administered in the emergency room provided temporary relief but he developed nausea and pain again and he was admitted  CT scan of the abdomen and pelvis showed no acute findings, some mild bowel wall thickening potentially secondary to colitis; patient denies any diarrhea with this current symptomatology  He is currently ordered for full liquid diet, IV Protonix every 12 hours, Reglan 10 mg by mouth 3 times daily with p r n  Zofran and Xanax  He tells me at this time that he is actually feeling a lot better than yesterday, tolerated full liquids last night and again this morning  He says he has never had EGD or colonoscopy before  He has never had a gastric emptying study either  He says his father actually had what he believes was colon cancer in his 46s  The patient says he takes pantoprazole daily and has done so for about 2-3 years, he thinks the medicine works well to control reflux  He denies any NSAID use  Denies any regular alcohol use    He says that he has had about 7 hospitalizations over the last few years with recurrent nausea and vomiting, he says that he generally does well between these attacks with relatively minimal GI symptoms for 3-4 months or so before he would experience another attack  REVIEW OF SYSTEMS:    CONSTITUTIONAL: Denies any fever, chills, or rigors  Good appetite, and no recent weight loss  HEENT: No earache or tinnitus  Denies hearing loss or visual disturbances  CARDIOVASCULAR: No chest pain or palpitations  RESPIRATORY: Denies any cough, hemoptysis, shortness of breath or dyspnea on exertion  GASTROINTESTINAL: As noted in the History of Present Illness  GENITOURINARY: No problems with urination  Denies any hematuria or dysuria  NEUROLOGIC: No dizziness or vertigo, denies headaches  MUSCULOSKELETAL: Denies any muscle or joint pain  SKIN: Denies skin rashes or itching  ENDOCRINE: Denies excessive thirst  Denies intolerance to heat or cold  PSYCHOSOCIAL: Denies depression or anxiety  Denies any recent memory loss  Historical Information   Past Medical History:   Diagnosis Date    Diabetes mellitus ([facility] [place] [address] )     Hypertension     Neuropathy      Past Surgical History:   Procedure Laterality Date    MD AMPUTATION FOOT,TRANSMETATARSAL Left 10/30/2017    Procedure: AMPUTATION TRANSMETATARSAL (TMA);  Surgeon: Dani Farnsworth DPM;  Location: [facility];  Service: Podiatry   [place] [id number] Left 11/3/2017    Procedure: AMPUTATION TRANSMETATARSAL (TMA), left foot;  Surgeon: Dani Farnsworth DPM;  Location[facility];  Service: Podiatry     Social History   Social History     Substance and Sexual Activity   Alcohol Use No     Social History     Substance and Sexual Activity   Drug Use No     Social History     Tobacco Use   Smoking Status Current Some Day Smoker    Types: Cigars   Smokeless Tobacco Never Used     History reviewed  No pertinent family history      Meds/Allergies     Medications Prior to Admission   Medication    ALPRAZolam (XANAX) 0 5 mg tablet    lisinopril (ZESTRIL) 10 mg tablet    metFORMIN (GLUCOPHAGE-XR) 750 mg 24 hr tablet    pantoprazole (PROTONIX) 40 mg tablet    atorvastatin (LIPITOR) 20 mg tablet    insulin lispro (HUMALOG KWIKPEN) 100 Units/mL SOPN    metoclopramide (REGLAN) 10 mg tablet     Current Facility-Administered Medications   Medication Dose Route Frequency    ALPRAZolam (XANAX) tablet 0 5 mg  0 5 mg Oral BID PRN    insulin glargine (LANTUS) subcutaneous injection 32 Units 0 32 mL  32 Units Subcutaneous HS    insulin lispro (HumaLOG) 100 units/mL subcutaneous injection 1-6 Units  1-6 Units Subcutaneous 4 times day    insulin lispro (HumaLOG) 100 units/mL subcutaneous injection 1-6 Units  1-6 Units Subcutaneous 0200    lisinopril (ZESTRIL) tablet 10 mg  10 mg Oral Daily    metoclopramide (REGLAN) tablet 10 mg  10 mg Oral TID AC    ondansetron (ZOFRAN) injection 4 mg  4 mg Intravenous Q4H PRN    pantoprazole (PROTONIX) injection 40 mg  40 mg Intravenous Q12H OG       No Known Allergies        Objective     Blood pressure 140/75, pulse 98, temperature 98 8 °F (37 1 °C), temperature source Oral, resp  rate 18, SpO2 98 %  Intake/Output Summary (Last 24 hours) at 7/5/2021 9320  Last data filed at 7/4/2021 2200  Gross per 24 hour   Intake 2319 58 ml   Output --   Net 2319 58 ml         PHYSICAL EXAM     General Appearance:   Alert, cooperative, no distress, appears stated age    HEENT:   Normocephalic, atraumatic, anicteric      Neck:  Supple, symmetrical, trachea midline, no adenopathy;    thyroid: no enlargement/tenderness/nodules; no carotid  bruit or JVD    Lungs:   Clear to auscultation bilaterally; no rales, rhonchi or wheezing; respirations unlabored    Heart[de-identified]   S1 and S2 normal; regular rate and rhythm; no murmur, rub, or gallop     Abdomen:   Soft, non-tender, non-distended; normal bowel sounds; no masses, no organomegaly    Genitalia:   Deferred    Rectal:   Deferred  Extremities:  No cyanosis, clubbing or edema    Pulses:  2+ and symmetric all extremities    Skin:  Skin color, texture, turgor normal, no rashes or lesions    Lymph nodes:  No palpable cervical, axillary or inguinal lymphadenopathy        Lab Results:   Admission on 07/04/2021   Component Date Value    POC Glucose 07/04/2021 304*    WBC 07/04/2021 10 95*    RBC 07/04/2021 6 00*    Hemoglobin 07/04/2021 17 6*    Hematocrit 07/04/2021 49 2     MCV 07/04/2021 82     MCH 07/04/2021 29 3     MCHC 07/04/2021 35 8     RDW 07/04/2021 11 9     MPV 07/04/2021 9 4     Platelets 60/94/2282 325     nRBC 07/04/2021 0     Neutrophils Relative 07/04/2021 69     Immat GRANS % 07/04/2021 1     Lymphocytes Relative 07/04/2021 23     Monocytes Relative 07/04/2021 6     Eosinophils Relative 07/04/2021 0     Basophils Relative 07/04/2021 1     Neutrophils Absolute 07/04/2021 7 57     Immature Grans Absolute 07/04/2021 0 05     Lymphocytes Absolute 07/04/2021 2 56     Monocytes Absolute 07/04/2021 0 67     Eosinophils Absolute 07/04/2021 0 04     Basophils Absolute 07/04/2021 0 06     Sodium 07/04/2021 127*    Potassium 07/04/2021 5 0     Chloride 07/04/2021 94*    CO2 07/04/2021 21     ANION GAP 07/04/2021 12     BUN 07/04/2021 19     Creatinine 07/04/2021 1 01     Glucose 07/04/2021 302*    Calcium 07/04/2021 8 9     AST 07/04/2021 33     ALT 07/04/2021 32     Alkaline Phosphatase 07/04/2021 85     Total Protein 07/04/2021 7 2     Albumin 07/04/2021 3 7     Total Bilirubin 07/04/2021 2 59*    eGFR 07/04/2021 82     Lipase 07/04/2021 212     BETA-HYDROXYBUTYRATE 07/04/2021 0 8*    Troponin I 07/04/2021 <0 02     ph, Eddy ISTAT 07/04/2021 7 595*    pCO2, Eddy i-STAT 07/04/2021 25 2*    pO2, Eddy i-STAT 07/04/2021 40 0     BE, i-STAT 07/04/2021 4*    HCO3, Eddy i-STAT 07/04/2021 24 4     CO2, i-STAT 07/04/2021 25     O2 Sat, i-STAT 07/04/2021 85     SODIUM, I-STAT 07/04/2021 132*    Potassium, i-STAT 07/04/2021 3 7     Hct, i-STAT 07/04/2021 49     Hgb, i-STAT 07/04/2021 16 7     Glucose, i-STAT 07/04/2021 314*    Specimen Type 07/04/2021 VENOUS     Color, UA 07/04/2021 Yellow     Clarity, UA 07/04/2021 Clear     pH, UA 07/04/2021 5 5     Leukocytes, UA 07/04/2021 Negative     Nitrite, UA 07/04/2021 Positive*    Protein, UA 07/04/2021 Negative     Glucose, UA 07/04/2021 500 (1/2%)*    Ketones, UA 07/04/2021 80 (3+)*    Urobilinogen, UA 07/04/2021 0 2     Bilirubin, UA 07/04/2021 Negative     Blood, UA 07/04/2021 Trace*    Specific [place], UA 07/04/2021 1 010     RBC, UA 07/04/2021 0-5     WBC, UA 07/04/2021 20-30*    Epithelial Cells 07/04/2021 Occasional     Bacteria, UA 07/04/2021 Occasional     Ventricular Rate 07/04/2021 85     Atrial Rate 07/04/2021 88     CT Interval 07/04/2021 158     QRSD Interval 07/04/2021 98     QT Interval 07/04/2021 364     QTC Interval 07/04/2021 433     P Axis 07/04/2021 55     QRS Axis 07/04/2021 47     T Wave Axis 07/04/2021 42     POC Glucose 07/04/2021 229*    POC Glucose 07/04/2021 249*    POC Glucose 07/05/2021 180*    WBC 07/05/2021 10 14     RBC 07/05/2021 5 37     Hemoglobin 07/05/2021 15 4     Hematocrit 07/05/2021 44 3     MCV 07/05/2021 83     MCH 07/05/2021 28 7     MCHC 07/05/2021 34 8     RDW 07/05/2021 11 9     MPV 07/05/2021 9 0     Platelets 78/86/7502 298     nRBC 07/05/2021 0     Neutrophils Relative 07/05/2021 60     Immat GRANS % 07/05/2021 0     Lymphocytes Relative 07/05/2021 33     Monocytes Relative 07/05/2021 6     Eosinophils Relative 07/05/2021 1     Basophils Relative 07/05/2021 0     Neutrophils Absolute 07/05/2021 6 01     Immature Grans Absolute 07/05/2021 0 04     Lymphocytes Absolute 07/05/2021 3 34     Monocytes Absolute 07/05/2021 0 62     Eosinophils Absolute 07/05/2021 0 09     Basophils Absolute 07/05/2021 0 04     Bilirubin, Direct 07/05/2021 0 29*    POC Glucose 07/05/2021 176*       Imaging Studies: I have personally reviewed pertinent reports  CT ABDOMEN AND PELVIS WITH IV CONTRAST     INDICATION:   Epigastric pain  epigastric pain, h/o gastroparesis      COMPARISON:  CT abdomen and pelvis 5/3/2018 and 10/5/2016     TECHNIQUE:  CT examination of the abdomen and pelvis was performed  Axial, sagittal, and coronal 2D reformatted images were created from the source data and submitted for interpretation      Radiation dose length product (DLP) for this visit:  920 mGy-cm   This examination, like all CT scans performed in the [facility], was performed utilizing techniques to minimize radiation dose exposure, including the use of iterative   reconstruction and automated exposure control      IV Contrast:  100 mL of iohexol (OMNIPAQUE)  350  Enteric Contrast:  Enteric contrast was not administered      FINDINGS:     ABDOMEN     LOWER CHEST:  3 mm nodule right lung base unchanged as far back as October 2016  Coronary artery calcification      LIVER/BILIARY TREE:  Unremarkable      GALLBLADDER:  No calcified gallstones  No pericholecystic inflammatory change      SPLEEN:  Unremarkable      PANCREAS:  Fatty infiltration of the pancreas      ADRENAL GLANDS:  Left adrenal 6 mm myelolipoma and 2 3 cm indeterminate nodule unchanged as far back as October 2016  Unremarkable right adrenal gland      KIDNEYS/URETERS:  Unremarkable  No hydronephrosis      STOMACH AND BOWEL:  Mild thickening of the ascending colon  Colonic diverticulosis without adjacent stranding  Small distal third duodenal segment diverticulum  No bowel obstruction      APPENDIX:  A normal appendix was visualized      ABDOMINOPELVIC CAVITY:  No ascites  No pneumoperitoneum  No lymphadenopathy      VESSELS:  Aortoiliac calcification    No aneurysm      PELVIS     REPRODUCTIVE ORGANS:  Unremarkable for patient's age      URINARY BLADDER:  Unremarkable      ABDOMINAL WALL/INGUINAL REGIONS:  Stable umbilical and small left inguinal fat-containing hernias      OSSEOUS STRUCTURES:  No acute fracture or osseous destructive lesion identified  Degenerative changes of the spine      IMPRESSION:     Mild thickening of the ascending colon likely underdistention  Consider segmental colitis in the appropriate clinical context      Otherwise, no evidence of acute abdominopelvic process      Colonic diverticulosis  ASSESSMENT/PLAN:     1  Longstanding recurrent nausea and vomiting, suspect to be related to gastroparesis in this patient with poorly controlled diabetes but cannot rule out peptic ulcer disease/gastritis with or without H pylori infection, less likely symptomatic cholelithiasis    -check right upper quadrant ultrasound  -will advance to low-fat low residue diet and monitor response  -symptomatic management, supportive care  -continue Protonix  -advised patient regarding the importance of small frequent meals, and about following up with his family doctor/endocrinologist with regards to management of his underlying diabetes  -if patient tolerates regular food with no issue, he can be discharged with plan for outpatient EGD and colonoscopy, and likely gastric emptying study at some point afterwards  - if patient does not tolerate regular food well, we can plan for EGD well patient is admitted      The patient was seen and examined by Dr Herson Lopes, all wharton medical decisions were made with Dr Herson Lopes  Thank you for allowing us to participate in the care of this pleasant patient  We will follow up with you closely

## 2021-07-06 LAB — BACTERIA UR CULT: ABNORMAL

## 2021-07-20 ENCOUNTER — HOSPITAL ENCOUNTER (OUTPATIENT)
Dept: MRI IMAGING | Facility: HOSPITAL | Age: 56
Discharge: HOME | End: 2021-07-20
Attending: PODIATRIST
Payer: MEDICARE

## 2021-07-20 DIAGNOSIS — M86.9 OSTEOMYELITIS (HCC): ICD-10-CM

## 2021-07-20 PROCEDURE — 73718 MRI LOWER EXTREMITY W/O DYE: CPT

## 2021-07-20 PROCEDURE — G1004 CDSM NDSC: HCPCS

## 2021-09-28 ENCOUNTER — HOSPITAL ENCOUNTER (EMERGENCY)
Facility: HOSPITAL | Age: 56
Discharge: HOME | End: 2021-09-28
Attending: EMERGENCY MEDICINE | Admitting: EMERGENCY MEDICINE
Payer: MEDICARE

## 2021-09-28 ENCOUNTER — APPOINTMENT (EMERGENCY)
Dept: RADIOLOGY | Facility: HOSPITAL | Age: 56
End: 2021-09-28
Payer: MEDICARE

## 2021-09-28 ENCOUNTER — APPOINTMENT (EMERGENCY)
Dept: CT IMAGING | Facility: HOSPITAL | Age: 56
End: 2021-09-28
Payer: MEDICARE

## 2021-09-28 VITALS
SYSTOLIC BLOOD PRESSURE: 170 MMHG | DIASTOLIC BLOOD PRESSURE: 89 MMHG | OXYGEN SATURATION: 97 % | HEART RATE: 87 BPM | RESPIRATION RATE: 18 BRPM | TEMPERATURE: 98.3 F

## 2021-09-28 DIAGNOSIS — R10.9 ABDOMINAL PAIN: ICD-10-CM

## 2021-09-28 DIAGNOSIS — K52.9 ACUTE GASTROENTERITIS: Primary | ICD-10-CM

## 2021-09-28 DIAGNOSIS — K63.5 CECAL POLYP: ICD-10-CM

## 2021-09-28 LAB
ALBUMIN SERPL BCP-MCNC: 3.6 G/DL (ref 3.5–5)
ALP SERPL-CCNC: 61 U/L (ref 46–116)
ALT SERPL W P-5'-P-CCNC: 34 U/L (ref 12–78)
ANION GAP SERPL CALCULATED.3IONS-SCNC: 12 MMOL/L (ref 4–13)
AST SERPL W P-5'-P-CCNC: 14 U/L (ref 5–45)
ATRIAL RATE: 81 BPM
BACTERIA UR QL AUTO: NORMAL /HPF
BASE EX.OXY STD BLDV CALC-SCNC: 86.9 % (ref 60–80)
BASE EXCESS BLDV CALC-SCNC: -1.1 MMOL/L
BASOPHILS # BLD AUTO: 0.07 THOUSANDS/ΜL (ref 0–0.1)
BASOPHILS NFR BLD AUTO: 1 % (ref 0–1)
BETA-HYDROXYBUTYRATE: 0.2 MMOL/L
BILIRUB SERPL-MCNC: 2.4 MG/DL (ref 0.2–1)
BILIRUB UR QL STRIP: NEGATIVE
BUN SERPL-MCNC: 18 MG/DL (ref 5–25)
CALCIUM SERPL-MCNC: 8.4 MG/DL (ref 8.3–10.1)
CHLORIDE SERPL-SCNC: 102 MMOL/L (ref 100–108)
CLARITY UR: CLEAR
CO2 SERPL-SCNC: 22 MMOL/L (ref 21–32)
COLOR UR: ABNORMAL
CREAT SERPL-MCNC: 0.97 MG/DL (ref 0.6–1.3)
EOSINOPHIL # BLD AUTO: 0.02 THOUSAND/ΜL (ref 0–0.61)
EOSINOPHIL NFR BLD AUTO: 0 % (ref 0–6)
ERYTHROCYTE [DISTWIDTH] IN BLOOD BY AUTOMATED COUNT: 12.6 % (ref 11.6–15.1)
EST. AVERAGE GLUCOSE BLD GHB EST-MCNC: 177 MG/DL
GFR SERPL CREATININE-BSD FRML MDRD: 86 ML/MIN/1.73SQ M
GLUCOSE SERPL-MCNC: 210 MG/DL (ref 65–140)
GLUCOSE UR STRIP-MCNC: ABNORMAL MG/DL
HBA1C MFR BLD: 7.8 %
HCO3 BLDV-SCNC: 23.8 MMOL/L (ref 24–30)
HCT VFR BLD AUTO: 50.3 % (ref 36.5–49.3)
HGB BLD-MCNC: 17.7 G/DL (ref 12–17)
HGB UR QL STRIP.AUTO: ABNORMAL
IMM GRANULOCYTES # BLD AUTO: 0.05 THOUSAND/UL (ref 0–0.2)
IMM GRANULOCYTES NFR BLD AUTO: 0 % (ref 0–2)
KETONES UR STRIP-MCNC: ABNORMAL MG/DL
LACTATE SERPL-SCNC: 1.8 MMOL/L (ref 0.5–2)
LEUKOCYTE ESTERASE UR QL STRIP: ABNORMAL
LIPASE SERPL-CCNC: 205 U/L (ref 73–393)
LYMPHOCYTES # BLD AUTO: 2.47 THOUSANDS/ΜL (ref 0.6–4.47)
LYMPHOCYTES NFR BLD AUTO: 19 % (ref 14–44)
MCH RBC QN AUTO: 29 PG (ref 26.8–34.3)
MCHC RBC AUTO-ENTMCNC: 35.2 G/DL (ref 31.4–37.4)
MCV RBC AUTO: 83 FL (ref 82–98)
MONOCYTES # BLD AUTO: 0.5 THOUSAND/ΜL (ref 0.17–1.22)
MONOCYTES NFR BLD AUTO: 4 % (ref 4–12)
NEUTROPHILS # BLD AUTO: 9.98 THOUSANDS/ΜL (ref 1.85–7.62)
NEUTS SEG NFR BLD AUTO: 76 % (ref 43–75)
NITRITE UR QL STRIP: NEGATIVE
NON-SQ EPI CELLS URNS QL MICRO: NORMAL /HPF
NRBC BLD AUTO-RTO: 0 /100 WBCS
O2 CT BLDV-SCNC: 19.4 ML/DL
OTHER STN SPEC: NORMAL
P AXIS: 45 DEGREES
PCO2 BLDV: 40.7 MM HG (ref 42–50)
PH BLDV: 7.38 [PH] (ref 7.3–7.4)
PH UR STRIP.AUTO: 5.5 [PH] (ref 4.5–8)
PLATELET # BLD AUTO: 335 THOUSANDS/UL (ref 149–390)
PMV BLD AUTO: 8.7 FL (ref 8.9–12.7)
PO2 BLDV: 58.3 MM HG (ref 35–45)
POTASSIUM SERPL-SCNC: 3.5 MMOL/L (ref 3.5–5.3)
PR INTERVAL: 152 MS
PROT SERPL-MCNC: 6.5 G/DL (ref 6.4–8.2)
PROT UR STRIP-MCNC: ABNORMAL MG/DL
QRS AXIS: 40 DEGREES
QRSD INTERVAL: 100 MS
QT INTERVAL: 372 MS
QTC INTERVAL: 432 MS
RBC # BLD AUTO: 6.1 MILLION/UL (ref 3.88–5.62)
RBC #/AREA URNS AUTO: NORMAL /HPF
SODIUM SERPL-SCNC: 136 MMOL/L (ref 136–145)
SP GR UR STRIP.AUTO: >=1.03 (ref 1–1.03)
T WAVE AXIS: 17 DEGREES
TROPONIN I SERPL-MCNC: <0.02 NG/ML
UROBILINOGEN UR QL STRIP.AUTO: 0.2 E.U./DL
VENTRICULAR RATE: 81 BPM
WBC # BLD AUTO: 13.09 THOUSAND/UL (ref 4.31–10.16)
WBC #/AREA URNS AUTO: NORMAL /HPF

## 2021-09-28 PROCEDURE — 84484 ASSAY OF TROPONIN QUANT: CPT | Performed by: EMERGENCY MEDICINE

## 2021-09-28 PROCEDURE — 81001 URINALYSIS AUTO W/SCOPE: CPT

## 2021-09-28 PROCEDURE — 71045 X-RAY EXAM CHEST 1 VIEW: CPT

## 2021-09-28 PROCEDURE — 80053 COMPREHEN METABOLIC PANEL: CPT | Performed by: EMERGENCY MEDICINE

## 2021-09-28 PROCEDURE — 74177 CT ABD & PELVIS W/CONTRAST: CPT

## 2021-09-28 PROCEDURE — 82805 BLOOD GASES W/O2 SATURATION: CPT | Performed by: EMERGENCY MEDICINE

## 2021-09-28 PROCEDURE — 85025 COMPLETE CBC W/AUTO DIFF WBC: CPT | Performed by: EMERGENCY MEDICINE

## 2021-09-28 PROCEDURE — 99285 EMERGENCY DEPT VISIT HI MDM: CPT

## 2021-09-28 PROCEDURE — 36415 COLL VENOUS BLD VENIPUNCTURE: CPT

## 2021-09-28 PROCEDURE — G1004 CDSM NDSC: HCPCS

## 2021-09-28 PROCEDURE — 99284 EMERGENCY DEPT VISIT MOD MDM: CPT | Performed by: EMERGENCY MEDICINE

## 2021-09-28 PROCEDURE — 83036 HEMOGLOBIN GLYCOSYLATED A1C: CPT | Performed by: EMERGENCY MEDICINE

## 2021-09-28 PROCEDURE — 83690 ASSAY OF LIPASE: CPT | Performed by: EMERGENCY MEDICINE

## 2021-09-28 PROCEDURE — 93010 ELECTROCARDIOGRAM REPORT: CPT | Performed by: INTERNAL MEDICINE

## 2021-09-28 PROCEDURE — 83605 ASSAY OF LACTIC ACID: CPT | Performed by: EMERGENCY MEDICINE

## 2021-09-28 PROCEDURE — 82010 KETONE BODYS QUAN: CPT | Performed by: EMERGENCY MEDICINE

## 2021-09-28 PROCEDURE — 93005 ELECTROCARDIOGRAM TRACING: CPT

## 2021-09-28 PROCEDURE — 96374 THER/PROPH/DIAG INJ IV PUSH: CPT

## 2021-09-28 PROCEDURE — 96375 TX/PRO/DX INJ NEW DRUG ADDON: CPT

## 2021-09-28 PROCEDURE — 96361 HYDRATE IV INFUSION ADD-ON: CPT

## 2021-09-28 RX ORDER — ONDANSETRON 4 MG/1
4 TABLET, ORALLY DISINTEGRATING ORAL EVERY 6 HOURS PRN
Qty: 20 TABLET | Refills: 0 | Status: ON HOLD | OUTPATIENT
Start: 2021-09-28 | End: 2022-07-03 | Stop reason: SDUPTHER

## 2021-09-28 RX ORDER — HYDROCODONE BITARTRATE AND ACETAMINOPHEN 5; 325 MG/1; MG/1
1 TABLET ORAL EVERY 6 HOURS PRN
Qty: 15 TABLET | Refills: 0 | Status: SHIPPED | OUTPATIENT
Start: 2021-09-28 | End: 2021-10-08

## 2021-09-28 RX ORDER — ONDANSETRON 2 MG/ML
4 INJECTION INTRAMUSCULAR; INTRAVENOUS ONCE
Status: COMPLETED | OUTPATIENT
Start: 2021-09-28 | End: 2021-09-28

## 2021-09-28 RX ORDER — DICYCLOMINE HCL 20 MG
20 TABLET ORAL 3 TIMES DAILY PRN
Qty: 20 TABLET | Refills: 0 | Status: SHIPPED | OUTPATIENT
Start: 2021-09-28 | End: 2022-07-02

## 2021-09-28 RX ORDER — HYDROMORPHONE HCL/PF 1 MG/ML
0.5 SYRINGE (ML) INJECTION ONCE
Status: COMPLETED | OUTPATIENT
Start: 2021-09-28 | End: 2021-09-28

## 2021-09-28 RX ADMIN — SODIUM CHLORIDE 1000 ML: 0.9 INJECTION, SOLUTION INTRAVENOUS at 15:50

## 2021-09-28 RX ADMIN — HYDROMORPHONE HYDROCHLORIDE 0.5 MG: 1 INJECTION, SOLUTION INTRAMUSCULAR; INTRAVENOUS; SUBCUTANEOUS at 13:56

## 2021-09-28 RX ADMIN — ONDANSETRON 4 MG: 2 INJECTION INTRAMUSCULAR; INTRAVENOUS at 13:56

## 2021-09-28 RX ADMIN — IOHEXOL 100 ML: 350 INJECTION, SOLUTION INTRAVENOUS at 15:09

## 2021-09-28 RX ADMIN — SODIUM CHLORIDE 1000 ML: 0.9 INJECTION, SOLUTION INTRAVENOUS at 13:54

## 2021-12-29 ENCOUNTER — APPOINTMENT (EMERGENCY)
Dept: CT IMAGING | Facility: HOSPITAL | Age: 56
End: 2021-12-29
Payer: MEDICARE

## 2021-12-29 ENCOUNTER — HOSPITAL ENCOUNTER (EMERGENCY)
Facility: HOSPITAL | Age: 56
Discharge: HOME | End: 2021-12-29
Attending: EMERGENCY MEDICINE | Admitting: EMERGENCY MEDICINE
Payer: MEDICARE

## 2021-12-29 VITALS
SYSTOLIC BLOOD PRESSURE: 193 MMHG | TEMPERATURE: 97.8 F | RESPIRATION RATE: 20 BRPM | DIASTOLIC BLOOD PRESSURE: 103 MMHG | OXYGEN SATURATION: 96 % | HEART RATE: 84 BPM

## 2021-12-29 DIAGNOSIS — K31.84 GASTROPARESIS: ICD-10-CM

## 2021-12-29 DIAGNOSIS — R10.9 ABDOMINAL PAIN: Primary | ICD-10-CM

## 2021-12-29 DIAGNOSIS — R11.2 NAUSEA AND VOMITING: ICD-10-CM

## 2021-12-29 LAB
ALBUMIN SERPL BCP-MCNC: 4.1 G/DL (ref 3.5–5)
ALP SERPL-CCNC: 81 U/L (ref 46–116)
ALT SERPL W P-5'-P-CCNC: 33 U/L (ref 12–78)
ANION GAP SERPL CALCULATED.3IONS-SCNC: 13 MMOL/L (ref 4–13)
AST SERPL W P-5'-P-CCNC: 16 U/L (ref 5–45)
BASE EX.OXY STD BLDV CALC-SCNC: 77.7 % (ref 60–80)
BASE EXCESS BLDV CALC-SCNC: 0.5 MMOL/L
BASOPHILS # BLD AUTO: 0.04 THOUSANDS/ΜL (ref 0–0.1)
BASOPHILS NFR BLD AUTO: 0 % (ref 0–1)
BILIRUB SERPL-MCNC: 1.64 MG/DL (ref 0.2–1)
BUN SERPL-MCNC: 20 MG/DL (ref 5–25)
CALCIUM SERPL-MCNC: 8.7 MG/DL (ref 8.3–10.1)
CHLORIDE SERPL-SCNC: 98 MMOL/L (ref 100–108)
CO2 SERPL-SCNC: 22 MMOL/L (ref 21–32)
CREAT SERPL-MCNC: 1.2 MG/DL (ref 0.6–1.3)
EOSINOPHIL # BLD AUTO: 0 THOUSAND/ΜL (ref 0–0.61)
EOSINOPHIL NFR BLD AUTO: 0 % (ref 0–6)
ERYTHROCYTE [DISTWIDTH] IN BLOOD BY AUTOMATED COUNT: 12.5 % (ref 11.6–15.1)
GFR SERPL CREATININE-BSD FRML MDRD: 66 ML/MIN/1.73SQ M
GLUCOSE SERPL-MCNC: 317 MG/DL (ref 65–140)
GLUCOSE SERPL-MCNC: 326 MG/DL (ref 65–140)
HCO3 BLDV-SCNC: 23.5 MMOL/L (ref 24–30)
HCT VFR BLD AUTO: 51 % (ref 36.5–49.3)
HGB BLD-MCNC: 18.3 G/DL (ref 12–17)
IMM GRANULOCYTES # BLD AUTO: 0.12 THOUSAND/UL (ref 0–0.2)
IMM GRANULOCYTES NFR BLD AUTO: 1 % (ref 0–2)
LIPASE SERPL-CCNC: 147 U/L (ref 73–393)
LYMPHOCYTES # BLD AUTO: 1.72 THOUSANDS/ΜL (ref 0.6–4.47)
LYMPHOCYTES NFR BLD AUTO: 11 % (ref 14–44)
MCH RBC QN AUTO: 29.5 PG (ref 26.8–34.3)
MCHC RBC AUTO-ENTMCNC: 35.9 G/DL (ref 31.4–37.4)
MCV RBC AUTO: 82 FL (ref 82–98)
MONOCYTES # BLD AUTO: 0.35 THOUSAND/ΜL (ref 0.17–1.22)
MONOCYTES NFR BLD AUTO: 2 % (ref 4–12)
NEUTROPHILS # BLD AUTO: 13.94 THOUSANDS/ΜL (ref 1.85–7.62)
NEUTS SEG NFR BLD AUTO: 86 % (ref 43–75)
NRBC BLD AUTO-RTO: 0 /100 WBCS
O2 CT BLDV-SCNC: 21.4 ML/DL
PCO2 BLDV: 34 MM HG (ref 42–50)
PH BLDV: 7.46 [PH] (ref 7.3–7.4)
PLATELET # BLD AUTO: 345 THOUSANDS/UL (ref 149–390)
PMV BLD AUTO: 9 FL (ref 8.9–12.7)
PO2 BLDV: 38.9 MM HG (ref 35–45)
POTASSIUM SERPL-SCNC: 4 MMOL/L (ref 3.5–5.3)
PROT SERPL-MCNC: 7.6 G/DL (ref 6.4–8.2)
RBC # BLD AUTO: 6.21 MILLION/UL (ref 3.88–5.62)
SODIUM SERPL-SCNC: 133 MMOL/L (ref 136–145)
WBC # BLD AUTO: 16.17 THOUSAND/UL (ref 4.31–10.16)

## 2021-12-29 PROCEDURE — 96375 TX/PRO/DX INJ NEW DRUG ADDON: CPT

## 2021-12-29 PROCEDURE — 85025 COMPLETE CBC W/AUTO DIFF WBC: CPT | Performed by: EMERGENCY MEDICINE

## 2021-12-29 PROCEDURE — 80053 COMPREHEN METABOLIC PANEL: CPT | Performed by: EMERGENCY MEDICINE

## 2021-12-29 PROCEDURE — 82948 REAGENT STRIP/BLOOD GLUCOSE: CPT

## 2021-12-29 PROCEDURE — C9113 INJ PANTOPRAZOLE SODIUM, VIA: HCPCS | Performed by: PHYSICIAN ASSISTANT

## 2021-12-29 PROCEDURE — 96372 THER/PROPH/DIAG INJ SC/IM: CPT

## 2021-12-29 PROCEDURE — 83690 ASSAY OF LIPASE: CPT | Performed by: PHYSICIAN ASSISTANT

## 2021-12-29 PROCEDURE — 74177 CT ABD & PELVIS W/CONTRAST: CPT

## 2021-12-29 PROCEDURE — 99282 EMERGENCY DEPT VISIT SF MDM: CPT | Performed by: EMERGENCY MEDICINE

## 2021-12-29 PROCEDURE — 82805 BLOOD GASES W/O2 SATURATION: CPT | Performed by: EMERGENCY MEDICINE

## 2021-12-29 PROCEDURE — 36415 COLL VENOUS BLD VENIPUNCTURE: CPT | Performed by: EMERGENCY MEDICINE

## 2021-12-29 PROCEDURE — 96366 THER/PROPH/DIAG IV INF ADDON: CPT

## 2021-12-29 PROCEDURE — 96365 THER/PROPH/DIAG IV INF INIT: CPT

## 2021-12-29 PROCEDURE — 99284 EMERGENCY DEPT VISIT MOD MDM: CPT

## 2021-12-29 RX ORDER — ONDANSETRON 4 MG/1
4 TABLET, ORALLY DISINTEGRATING ORAL ONCE
Status: DISCONTINUED | OUTPATIENT
Start: 2021-12-29 | End: 2021-12-29

## 2021-12-29 RX ORDER — PANTOPRAZOLE SODIUM 40 MG/1
40 TABLET, DELAYED RELEASE ORAL DAILY
Qty: 30 TABLET | Refills: 0 | Status: SHIPPED | OUTPATIENT
Start: 2021-12-29 | End: 2022-07-02

## 2021-12-29 RX ORDER — ONDANSETRON 4 MG/1
4 TABLET, ORALLY DISINTEGRATING ORAL EVERY 6 HOURS PRN
Qty: 20 TABLET | Refills: 0 | Status: SHIPPED | OUTPATIENT
Start: 2021-12-29 | End: 2022-07-02

## 2021-12-29 RX ORDER — HALOPERIDOL 5 MG/ML
5 INJECTION INTRAMUSCULAR ONCE
Status: COMPLETED | OUTPATIENT
Start: 2021-12-29 | End: 2021-12-29

## 2021-12-29 RX ORDER — DIPHENHYDRAMINE HYDROCHLORIDE 50 MG/ML
25 INJECTION INTRAMUSCULAR; INTRAVENOUS ONCE
Status: COMPLETED | OUTPATIENT
Start: 2021-12-29 | End: 2021-12-29

## 2021-12-29 RX ORDER — PANTOPRAZOLE SODIUM 40 MG/1
40 INJECTION, POWDER, FOR SOLUTION INTRAVENOUS ONCE
Status: COMPLETED | OUTPATIENT
Start: 2021-12-29 | End: 2021-12-29

## 2021-12-29 RX ORDER — METOCLOPRAMIDE 10 MG/1
10 TABLET ORAL 3 TIMES DAILY PRN
Qty: 30 TABLET | Refills: 0 | Status: SHIPPED | OUTPATIENT
Start: 2021-12-29 | End: 2022-07-02

## 2021-12-29 RX ADMIN — IOHEXOL 100 ML: 350 INJECTION, SOLUTION INTRAVENOUS at 12:37

## 2021-12-29 RX ADMIN — HALOPERIDOL LACTATE 5 MG: 5 INJECTION, SOLUTION INTRAMUSCULAR at 11:07

## 2021-12-29 RX ADMIN — DIPHENHYDRAMINE HYDROCHLORIDE 25 MG: 50 INJECTION, SOLUTION INTRAMUSCULAR; INTRAVENOUS at 11:07

## 2021-12-29 RX ADMIN — SODIUM CHLORIDE, SODIUM LACTATE, POTASSIUM CHLORIDE, AND CALCIUM CHLORIDE 1000 ML: .6; .31; .03; .02 INJECTION, SOLUTION INTRAVENOUS at 11:52

## 2021-12-29 RX ADMIN — SODIUM CHLORIDE, SODIUM LACTATE, POTASSIUM CHLORIDE, AND CALCIUM CHLORIDE 1000 ML: .6; .31; .03; .02 INJECTION, SOLUTION INTRAVENOUS at 13:45

## 2021-12-29 RX ADMIN — PANTOPRAZOLE SODIUM 40 MG: 40 INJECTION, POWDER, FOR SOLUTION INTRAVENOUS at 12:19

## 2021-12-29 NOTE — ED PROVIDER NOTES
History  Chief Complaint   Patient presents with    Abdominal Pain     PT reports having a "gastroporesis attack" symptoms include "vomiting, abdominal, and SOB"     60 yr male with hx of htn/ iddm -gastroparesis - presents with 3 days of typical gastroparesis flare  With multiple episodes of non bloody vomitus  Daily  With upper ad apin -- states typical flare--  bs 200 this am -- no fevers- cp/sob/ change in stools- no new symptoms       History provided by:  Patient   used: No    Abdominal Pain  Pain location: upper abd pain   Associated symptoms: nausea and vomiting    Associated symptoms: no chills, no constipation, no diarrhea, no fatigue and no fever        Cannot display prior to admission medications because the patient has not been admitted in this contact  Past Medical History:   Diagnosis Date    Diabetes mellitus ([facility] [address] )     Gastroparesis     Hypertension     Neuropathy        Past Surgical History:   Procedure Laterality Date    GA AMPUTATION FOOT,TRANSMETATARSAL Left 10/30/2017    Procedure: AMPUTATION TRANSMETATARSAL (TMA);  Surgeon: Ayana Schmid DPM;  [facility];  Service: Podiatry    [address] [place] [address] Left 11/3/2017    Procedure: AMPUTATION TRANSMETATARSAL (TMA), left foot;  Surgeon: yAana Schmid DPM;  [facility];  Service: Podiatry       History reviewed  No pertinent family history  I have reviewed and agree with the history as documented  E-Cigarette/Vaping    E-Cigarette Use Never User      E-Cigarette/Vaping Substances     Social History     Tobacco Use    Smoking status: Current Some Day Smoker     Types: Cigars    Smokeless tobacco: Never Used   Vaping Use    Vaping Use: Never used   Substance Use Topics    Alcohol use: No    Drug use: Yes     Types: Marijuana       Review of Systems   Constitutional: Positive for activity change and appetite change   Negative for chills, diaphoresis, fatigue, fever and unexpected weight change  HENT: Negative  Eyes: Negative  Respiratory: Negative  Cardiovascular: Negative  Gastrointestinal: Positive for abdominal pain, nausea and vomiting  Negative for abdominal distention, anal bleeding, blood in stool, constipation, diarrhea and rectal pain  Endocrine: Negative  Genitourinary: Negative  Musculoskeletal: Negative  Skin: Negative  Allergic/Immunologic: Negative  Neurological: Negative  Hematological: Negative  Psychiatric/Behavioral: Negative  Physical Exam  Physical Exam  Vitals and nursing note reviewed  Constitutional:       General: He is in acute distress  Appearance: He is well-developed  He is not ill-appearing, toxic-appearing or diaphoretic  Comments: avss-- htnsive-- pulse ox 99 % on ra- interpretation is normal- no intervention    HENT:      Head: Normocephalic and atraumatic  Mouth/Throat:      Mouth: Mucous membranes are moist    Eyes:      General: No scleral icterus  Extraocular Movements: Extraocular movements intact  Pupils: Pupils are equal, round, and reactive to light  Comments: Mm pink   Cardiovascular:      Rate and Rhythm: Normal rate and regular rhythm  Heart sounds: Normal heart sounds  No murmur heard  No friction rub  No gallop  Comments: Equal bilateral radial pulses  Pulmonary:      Effort: Pulmonary effort is normal  No respiratory distress  Breath sounds: Normal breath sounds  No stridor  No wheezing, rhonchi or rales  Chest:      Chest wall: No tenderness  Abdominal:      General: Bowel sounds are normal  There is no distension or abdominal bruit  There are no signs of injury  Palpations: Abdomen is soft  There is no shifting dullness, fluid wave, hepatomegaly, splenomegaly, mass or pulsatile mass  Tenderness: There is abdominal tenderness in the epigastric area and left upper quadrant   There is no right CVA tenderness, left CVA tenderness, guarding or rebound  Negative signs include Turner's sign, Rovsing's sign, McBurney's sign, psoas sign and obturator sign  Hernia: No hernia is present  There is no hernia in the umbilical area, ventral area, left inguinal area, right femoral area, left femoral area or right inguinal area  Skin:     General: Skin is warm  Capillary Refill: Capillary refill takes less than 2 seconds  Coloration: Skin is not cyanotic, jaundiced, mottled or pale  Findings: No erythema or rash  Neurological:      General: No focal deficit present  Mental Status: He is alert and oriented to person, place, and time  Cranial Nerves: No cranial nerve deficit  Motor: No weakness  Psychiatric:         Mood and Affect: Mood is depressed  Mood is not anxious           Behavior: Behavior normal          Vital Signs  ED Triage Vitals [12/29/21 1053]   Temperature Pulse Respirations Blood Pressure SpO2   97 8 °F (36 6 °C) 92 20 (!) 187/105 99 %      Temp Source Heart Rate Source Patient Position - Orthostatic VS BP Location FiO2 (%)   Oral Monitor Sitting Left arm --      Pain Score       10 - Worst Possible Pain           Vitals:    12/29/21 1053   BP: (!) 187/105   Pulse: 92   Patient Position - Orthostatic VS: Sitting         Visual Acuity      ED Medications  Medications   haloperidol lactate (HALDOL) injection 5 mg (has no administration in time range)   diphenhydrAMINE (BENADRYL) injection 25 mg (has no administration in time range)       Diagnostic Studies  Results Reviewed     Procedure Component Value Units Date/Time    CBC and differential [[id number]]     Lab Status: No result Specimen: Blood     Comprehensive metabolic panel [[id number]]     Lab Status: No result Specimen: Blood     Blood gas, venous [[id number]]     Lab Status: No result Specimen: Blood                  No orders to display              Procedures  Procedures         ED Course  ED Course as of 01/02/22 1448   Wed Dec 29, 2021 5 Erv md note  pt did not take any of his meds or insulin today    1114 Er md note-- accuchech 326                                             MDM    Disposition  Final diagnoses:   None     ED Disposition     None      Follow-up Information    None         Patient's Medications   Discharge Prescriptions    No medications on file       No discharge procedures on file      PDMP Review       Value Time User    PDMP Reviewed  Yes 7/4/2021  2:33 PM Zenaida Catherine DO          ED Provider  Electronically Signed by           Magaly Berkowitz MD  01/02/22 9078

## 2021-12-29 NOTE — ED CARE HANDOFF
Emergency Department Sign Out Note        Sign out and transfer of care from Dr Marti Peñaloza  See Separate Emergency Department note  The patient, Riley Weber, was evaluated by the previous provider for complaint of abdominal pain and vomiting, finding symptoms are consistent with prior "gastroparesis attacks " His past medical history includes dm on insulin, with the patient relating that he has not administered his insulin since time of symptom onset 4 days ago  The patient states that he had taken sl Zofran at home, which usually offers alleviation of his symptoms however he continued with vomiting despite medication use who had presented to the ED for further evaluation  The patient states that he has not eaten or drank much over the past few days  Reports numerous episodes of nonbloody, nonbilious emesis  Denies decreased urination or UTI symptoms  He denies fevers or sweats  He has no pertinent surgical history  Per review of emr, patient admitted 7/2021 for intractable vomiting in setting of gastroparesis  The patient notes that he does have upcoming follow up with Gastroenterology  Patient offers no other complaints at this time  Workup Completed:  Labs ordered in triage  Patient medicated with Benadryl and Haldol in triage      ED Course / Workup Pending (followup):   Results Reviewed     Procedure Component Value Units Date/Time    Lipase [[id number]]  (Normal) Collected: 12/29/21 1106    Lab Status: Final result Specimen: Blood from Arm, Right Updated: 12/29/21 1404     Lipase 147 u/L     Comprehensive metabolic panel [[id number]]  (Abnormal) Collected: 12/29/21 1106    Lab Status: Final result Specimen: Blood from Arm, Right Updated: 12/29/21 1219     Sodium 133 mmol/L      Potassium 4 0 mmol/L      Chloride 98 mmol/L      CO2 22 mmol/L      ANION GAP 13 mmol/L      BUN 20 mg/dL      Creatinine 1 20 mg/dL      Glucose 317 mg/dL      Calcium 8 7 mg/dL      AST 16 U/L      ALT 33 U/L Alkaline Phosphatase 81 U/L      Total Protein 7 6 g/dL      Albumin 4 1 g/dL      Total Bilirubin 1 64 mg/dL      eGFR 66 ml/min/1 73sq m     Narrative:      [place] guidelines for Chronic Kidney Disease (CKD):     Stage 1 with normal or high GFR (GFR > 90 mL/min/1 73 square meters)    Stage 2 Mild CKD (GFR = 60-89 mL/min/1 73 square meters)    Stage 3A Moderate CKD (GFR = 45-59 mL/min/1 73 square meters)    Stage 3B Moderate CKD (GFR = 30-44 mL/min/1 73 square meters)    Stage 4 Severe CKD (GFR = 15-29 mL/min/1 73 square meters)    Stage 5 End Stage CKD (GFR <15 mL/min/1 73 square meters)  Note: GFR calculation is accurate only with a steady state creatinine    CBC and differential [[id number]]  (Abnormal) Collected: 12/29/21 1106    Lab Status: Final result Specimen: Blood from Arm, Right Updated: 12/29/21 1134     WBC 16 17 Thousand/uL      RBC 6 21 Million/uL      Hemoglobin 18 3 g/dL      Hematocrit 51 0 %      MCV 82 fL      MCH 29 5 pg      MCHC 35 9 g/dL      RDW 12 5 %      MPV 9 0 fL      Platelets 094 Thousands/uL      nRBC 0 /100 WBCs      Neutrophils Relative 86 %      Immat GRANS % 1 %      Lymphocytes Relative 11 %      Monocytes Relative 2 %      Eosinophils Relative 0 %      Basophils Relative 0 %      Neutrophils Absolute 13 94 Thousands/µL      Immature Grans Absolute 0 12 Thousand/uL      Lymphocytes Absolute 1 72 Thousands/µL      Monocytes Absolute 0 35 Thousand/µL      Eosinophils Absolute 0 00 Thousand/µL      Basophils Absolute 0 04 Thousands/µL     Blood gas, venous [[id number]]  (Abnormal) Collected: 12/29/21 1106    Lab Status: Final result Specimen: Blood from Arm, Right Updated: 12/29/21 1128     pH, Eddy 7 457     pCO2, Eddy 34 0 mm Hg      pO2, Eddy 38 9 mm Hg      HCO3, Eddy 23 5 mmol/L      Base Excess, Eddy 0 5 mmol/L      O2 Content, Eddy 21 4 ml/dL      O2 HGB, VENOUS 77 7 %     Fingerstick Glucose (POCT) [[id number]]  (Abnormal) Collected: 12/29/21 1114 Lab Status: Final result Updated: 12/29/21 1116     POC Glucose 326 mg/dl                CT abdomen pelvis with contrast   Final Result by Evelyn Crooks MD (12/29 7949)      1  No acute findings in the abdomen or pelvis  2   Slightly increased size since 7/4/2021 of a mural soft tissue nodule in the cecum adjacent to the ileocecal valve presumably representing a polyp  Recommend endoscopic evaluation, particularly given patient's reported family history of early colon    cancer  The study was marked in EPIC for significant notification, as well as for follow-up  Workstation performed: [id number]                                         ED Course as of 12/30/21 1720   Wed Dec 29, 2021   1410 Patient reassessed, feeling improved  Testing results reviewed  Will po trial and reassess  1439 Patient tolerated oral intake without issue  He has no nausea or abdominal pain and is requesting discharge home  There were no episodes of vomiting while being observed here in the ED  Strict ED return precautions given  Patient verbalized understanding and is agreeable with plan  Has GI follow up in February and was encouraged to keep appointment as scheduled, sooner if able  Procedures  MDM  Number of Diagnoses or Management Options  Abdominal pain  Gastroparesis  Nausea and vomiting: new and requires workup  Diagnosis management comments: This is a 53-year-old male with past medical history of diabetes and gastroparesis presenting to the emergency department with complaint of nausea, vomiting, generalized abdominal pain, and inability to tolerate oral intake over the past 4 days      Differential diagnosis includes but is not limited to: gastroparesis, gastritis, pud, enteritis, pancreatitis; lab work to assess for evidence of dka given inability to administer insulin or taken his medications since onset of symptoms    Initial ED plan: labs, imaging, antiemetics/ivf & reassessment    Final ED Assessment: Vital signs on ED presentation notable for elevated BP with the patient noting he has not taken his anti-hypertensive medication in 4 days, examination as above  All labs and imaging independently reviewed with imaging interpreted by the Radiologist   Labs demonstrate leukocytosis of 16, likely in setting of stress response, & hyperglycemia  There is no evidence of diabetic ketoacidosis  CT abdomen/pelvis reports no acute finding in the abdomen or pelvis, and slightly increased size of cecal mass  All testing results reviewed with the patient at bedside and a physical copy of his CT was given  The patient had received IV fluids while being observed in the emergency department  He had reported improvement of symptoms with treatments provided  He had tolerated oral intake without recurrent episodes of vomiting  Patient requesting discharge home  Will provide refills of Zofran, Reglan, and Protonix  I had offered to provide the patient's antihypertensive medication in the department today which he had declined, stating he will take this once he returns home  Recommended follow-up with GI as scheduled in February, sooner if able  We had reviewed indications for ED return  The patient had verbalized understanding and he was comfortable and agreeable with disposition and care plan  He was discharged in stable condition         Amount and/or Complexity of Data Reviewed  Clinical lab tests: ordered and reviewed  Tests in the radiology section of CPT®: ordered and reviewed  Review and summarize past medical records: yes  Independent visualization of images, tracings, or specimens: yes    Risk of Complications, Morbidity, and/or Mortality  Presenting problems: moderate  Diagnostic procedures: moderate  Management options: moderate    Patient Progress  Patient progress: stable          Disposition  Final diagnoses:   Abdominal pain   Nausea and vomiting   Gastroparesis     Time reflects when diagnosis was documented in both MDM as applicable and the Disposition within this note     Time User Action Codes Description Comment    12/29/2021  2:45 PM Gene Alfie Add [R10 9] Abdominal pain     12/29/2021  2:45 PM Gene Alfie Add [R11 2] Nausea and vomiting     12/29/2021  2:46 PM Gene Alfie Add [B86 88] Gastroparesis       ED Disposition     ED Disposition Condition Date/Time Comment    Discharge Stable Wed Dec 29, 2021  2:38 PM [address] [facility] discharge to home/self care  Follow-up Information     Follow up With Specialties Details Why Contact Info Additional Information    Rossana Lewis MD Internal Medicine   [address] [facility] [id number] [place] [address] [facility] [place] [facility] Call   [address] [id number] [address] [place] [address] [facility] [place], [address] [address], [place], [place], [address]     [facility] Emergency Department Emergency Medicine  If symptoms worsen [address] [facility] [address] [facility] Emergency Department, [address], [place], [place], [address]        Discharge Medication List as of 12/29/2021  2:47 PM      START taking these medications    Details   !! metoclopramide (Reglan) 10 mg tablet Take 1 tablet (10 mg total) by mouth 3 (three) times a day as needed (nausea and vomiting), Starting Wed 12/29/2021, Normal      !! ondansetron (Zofran ODT) 4 mg disintegrating tablet Take 1 tablet (4 mg total) by mouth every 6 (six) hours as needed for nausea or vomiting, Starting Wed 12/29/2021, Normal      !! pantoprazole (PROTONIX) 40 mg tablet Take 1 tablet (40 mg total) by mouth daily, Starting Wed 12/29/2021, Normal       !! - Potential duplicate medications found  Please discuss with provider        CONTINUE these medications which have NOT CHANGED    Details   ALPRAZolam (XANAX) 0 5 mg tablet Take 1 tablet by mouth 2 (two) times a day as needed for anxiety for up to 10 days, Starting Tue 11/7/2017, Until Sun 7/4/2021 at 2359, Print      atorvastatin (LIPITOR) 20 mg tablet Take 20 mg by mouth daily, Historical Med      dicyclomine (BENTYL) 20 mg tablet Take 1 tablet (20 mg total) by mouth 3 (three) times a day as needed (Abdominal cramping, diarrhea), Starting Tue 9/28/2021, Normal      insulin lispro (HumaLOG KwikPen) 100 units/mL injection pen Inject 32 Units under the skin 2 (two) times a day with meals, Starting Mon 7/5/2021, No Print      lisinopril (ZESTRIL) 10 mg tablet Take 10 mg by mouth daily, Historical Med      metFORMIN (GLUCOPHAGE-XR) 750 mg 24 hr tablet Take 750 mg by mouth daily with breakfast, Historical Med      !! metoclopramide (REGLAN) 10 mg tablet Take 1 tablet (10 mg total) by mouth 3 (three) times a day as needed (nausea/ vomiting) As needed for nausea, Starting u 3/14/2019, Print      !! ondansetron (Zofran ODT) 4 mg disintegrating tablet Take 1 tablet (4 mg total) by mouth every 6 (six) hours as needed for nausea or vomiting, Starting Tue 9/28/2021, Normal      !! ondansetron (ZOFRAN-ODT) 4 mg disintegrating tablet Take 1 tablet (4 mg total) by mouth every 6 (six) hours as needed for nausea or vomiting, Starting Mon 7/5/2021, Normal      !! pantoprazole (PROTONIX) 40 mg tablet Take 40 mg by mouth daily, Historical Med       !! - Potential duplicate medications found  Please discuss with provider  No discharge procedures on file         ED Provider  Electronically Signed by     Arnie Bailey PA-C  12/30/21 3381

## 2021-12-29 NOTE — DISCHARGE INSTRUCTIONS
Please take antiemetics as directed as needed for alleviation of nausea and vomiting  Follow-up with Gastroenterology as scheduled, sooner if able  Continue maintenance medications as directed  Return immediately to the emergency department if you experience any new or worsening symptoms as discussed

## 2022-01-25 ENCOUNTER — APPOINTMENT (EMERGENCY)
Dept: RADIOLOGY | Facility: HOSPITAL | Age: 57
End: 2022-01-25
Payer: MEDICARE

## 2022-01-25 ENCOUNTER — HOSPITAL ENCOUNTER (OUTPATIENT)
Facility: HOSPITAL | Age: 57
Setting detail: OBSERVATION
LOS: 1 days | Discharge: HOME | End: 2022-01-26
Attending: EMERGENCY MEDICINE | Admitting: INTERNAL MEDICINE
Payer: MEDICARE

## 2022-01-25 DIAGNOSIS — L03.115 CELLULITIS OF RIGHT FOOT: ICD-10-CM

## 2022-01-25 DIAGNOSIS — S90.31XA CONTUSION OF RIGHT FOOT, INITIAL ENCOUNTER: ICD-10-CM

## 2022-01-25 DIAGNOSIS — S90.821A BLISTER OF RIGHT FOOT, INITIAL ENCOUNTER: ICD-10-CM

## 2022-01-25 DIAGNOSIS — A41.9 SEPSIS (HCC): Primary | ICD-10-CM

## 2022-01-25 LAB
APTT PPP: 18 SECONDS (ref 23–37)
BASE EX.OXY STD BLDV CALC-SCNC: 91 % (ref 60–80)
BASE EXCESS BLDV CALC-SCNC: 2.3 MMOL/L
BASOPHILS # BLD AUTO: 0.07 THOUSANDS/ΜL (ref 0–0.1)
BASOPHILS NFR BLD AUTO: 1 % (ref 0–1)
BETA-HYDROXYBUTYRATE: 0.3 MMOL/L
CK MB SERPL-MCNC: 1 % (ref 0–2.5)
CK MB SERPL-MCNC: 4.9 NG/ML (ref 0–5)
CK SERPL-CCNC: 482 U/L (ref 39–308)
EOSINOPHIL # BLD AUTO: 0.22 THOUSAND/ΜL (ref 0–0.61)
EOSINOPHIL NFR BLD AUTO: 2 % (ref 0–6)
ERYTHROCYTE [DISTWIDTH] IN BLOOD BY AUTOMATED COUNT: 12.5 % (ref 11.6–15.1)
GLUCOSE SERPL-MCNC: 217 MG/DL (ref 65–140)
HCO3 BLDV-SCNC: 27.2 MMOL/L (ref 24–30)
HCT VFR BLD AUTO: 40.4 % (ref 36.5–49.3)
HGB BLD-MCNC: 13.9 G/DL (ref 12–17)
IMM GRANULOCYTES # BLD AUTO: 0.06 THOUSAND/UL (ref 0–0.2)
IMM GRANULOCYTES NFR BLD AUTO: 1 % (ref 0–2)
INR PPP: 0.89 (ref 0.84–1.19)
LACTATE SERPL-SCNC: 1 MMOL/L (ref 0.5–2)
LYMPHOCYTES # BLD AUTO: 2.41 THOUSANDS/ΜL (ref 0.6–4.47)
LYMPHOCYTES NFR BLD AUTO: 19 % (ref 14–44)
MCH RBC QN AUTO: 29.1 PG (ref 26.8–34.3)
MCHC RBC AUTO-ENTMCNC: 34.4 G/DL (ref 31.4–37.4)
MCV RBC AUTO: 85 FL (ref 82–98)
MONOCYTES # BLD AUTO: 1.13 THOUSAND/ΜL (ref 0.17–1.22)
MONOCYTES NFR BLD AUTO: 9 % (ref 4–12)
NEUTROPHILS # BLD AUTO: 9.02 THOUSANDS/ΜL (ref 1.85–7.62)
NEUTS SEG NFR BLD AUTO: 68 % (ref 43–75)
NRBC BLD AUTO-RTO: 0 /100 WBCS
O2 CT BLDV-SCNC: 20.2 ML/DL
PCO2 BLDV: 42.9 MM HG (ref 42–50)
PH BLDV: 7.42 [PH] (ref 7.3–7.4)
PLATELET # BLD AUTO: 249 THOUSANDS/UL (ref 149–390)
PMV BLD AUTO: 9.1 FL (ref 8.9–12.7)
PO2 BLDV: 64.9 MM HG (ref 35–45)
PROTHROMBIN TIME: 12 SECONDS (ref 11.6–14.5)
RBC # BLD AUTO: 4.77 MILLION/UL (ref 3.88–5.62)
WBC # BLD AUTO: 12.91 THOUSAND/UL (ref 4.31–10.16)

## 2022-01-25 PROCEDURE — 85730 THROMBOPLASTIN TIME PARTIAL: CPT | Performed by: EMERGENCY MEDICINE

## 2022-01-25 PROCEDURE — 83605 ASSAY OF LACTIC ACID: CPT | Performed by: EMERGENCY MEDICINE

## 2022-01-25 PROCEDURE — 86141 C-REACTIVE PROTEIN HS: CPT | Performed by: EMERGENCY MEDICINE

## 2022-01-25 PROCEDURE — 96361 HYDRATE IV INFUSION ADD-ON: CPT

## 2022-01-25 PROCEDURE — 36415 COLL VENOUS BLD VENIPUNCTURE: CPT | Performed by: EMERGENCY MEDICINE

## 2022-01-25 PROCEDURE — 73630 X-RAY EXAM OF FOOT: CPT

## 2022-01-25 PROCEDURE — 99285 EMERGENCY DEPT VISIT HI MDM: CPT | Performed by: EMERGENCY MEDICINE

## 2022-01-25 PROCEDURE — 71045 X-RAY EXAM CHEST 1 VIEW: CPT

## 2022-01-25 PROCEDURE — 93005 ELECTROCARDIOGRAM TRACING: CPT

## 2022-01-25 PROCEDURE — 99284 EMERGENCY DEPT VISIT MOD MDM: CPT

## 2022-01-25 PROCEDURE — 82805 BLOOD GASES W/O2 SATURATION: CPT | Performed by: EMERGENCY MEDICINE

## 2022-01-25 PROCEDURE — 87040 BLOOD CULTURE FOR BACTERIA: CPT | Performed by: EMERGENCY MEDICINE

## 2022-01-25 PROCEDURE — 85610 PROTHROMBIN TIME: CPT | Performed by: EMERGENCY MEDICINE

## 2022-01-25 PROCEDURE — 96365 THER/PROPH/DIAG IV INF INIT: CPT

## 2022-01-25 PROCEDURE — 80053 COMPREHEN METABOLIC PANEL: CPT | Performed by: EMERGENCY MEDICINE

## 2022-01-25 PROCEDURE — 82010 KETONE BODYS QUAN: CPT | Performed by: EMERGENCY MEDICINE

## 2022-01-25 PROCEDURE — 82948 REAGENT STRIP/BLOOD GLUCOSE: CPT

## 2022-01-25 PROCEDURE — 82550 ASSAY OF CK (CPK): CPT | Performed by: EMERGENCY MEDICINE

## 2022-01-25 PROCEDURE — 82553 CREATINE MB FRACTION: CPT | Performed by: EMERGENCY MEDICINE

## 2022-01-25 PROCEDURE — 85025 COMPLETE CBC W/AUTO DIFF WBC: CPT | Performed by: EMERGENCY MEDICINE

## 2022-01-25 RX ORDER — SODIUM CHLORIDE 9 MG/ML
125 INJECTION, SOLUTION INTRAVENOUS CONTINUOUS
Status: DISCONTINUED | OUTPATIENT
Start: 2022-01-25 | End: 2022-01-26

## 2022-01-25 RX ORDER — ACETAMINOPHEN 325 MG/1
975 TABLET ORAL ONCE
Status: COMPLETED | OUTPATIENT
Start: 2022-01-25 | End: 2022-01-25

## 2022-01-25 RX ADMIN — SODIUM CHLORIDE 3 G: 9 INJECTION, SOLUTION INTRAVENOUS at 23:36

## 2022-01-25 RX ADMIN — SODIUM CHLORIDE 125 ML/HR: 0.9 INJECTION, SOLUTION INTRAVENOUS at 23:02

## 2022-01-25 RX ADMIN — ACETAMINOPHEN 975 MG: 325 TABLET, FILM COATED ORAL at 23:01

## 2022-01-25 RX ADMIN — SODIUM CHLORIDE 1000 ML: 0.9 INJECTION, SOLUTION INTRAVENOUS at 23:00

## 2022-01-26 VITALS
HEART RATE: 94 BPM | RESPIRATION RATE: 18 BRPM | DIASTOLIC BLOOD PRESSURE: 84 MMHG | OXYGEN SATURATION: 93 % | WEIGHT: 233.69 LBS | BODY MASS INDEX: 29.06 KG/M2 | SYSTOLIC BLOOD PRESSURE: 154 MMHG | TEMPERATURE: 98.5 F | HEIGHT: 75 IN

## 2022-01-26 PROBLEM — L03.115 CELLULITIS OF RIGHT FOOT: Status: RESOLVED | Noted: 2022-01-26 | Resolved: 2022-01-26

## 2022-01-26 PROBLEM — S99.921A INJURY OF RIGHT FOOT: Status: ACTIVE | Noted: 2022-01-26

## 2022-01-26 PROBLEM — Z72.0 TOBACCO ABUSE: Status: ACTIVE | Noted: 2022-01-26

## 2022-01-26 PROBLEM — R65.10 SIRS (SYSTEMIC INFLAMMATORY RESPONSE SYNDROME) (HCC): Status: ACTIVE | Noted: 2022-01-26

## 2022-01-26 PROBLEM — L03.115 CELLULITIS OF RIGHT FOOT: Status: ACTIVE | Noted: 2022-01-26

## 2022-01-26 LAB
ALBUMIN SERPL BCP-MCNC: 3.4 G/DL (ref 3.5–5)
ALP SERPL-CCNC: 68 U/L (ref 46–116)
ALT SERPL W P-5'-P-CCNC: 26 U/L (ref 12–78)
ANION GAP SERPL CALCULATED.3IONS-SCNC: 6 MMOL/L (ref 4–13)
ANION GAP SERPL CALCULATED.3IONS-SCNC: 9 MMOL/L (ref 4–13)
AST SERPL W P-5'-P-CCNC: 33 U/L (ref 5–45)
BASOPHILS # BLD AUTO: 0.08 THOUSANDS/ΜL (ref 0–0.1)
BASOPHILS NFR BLD AUTO: 1 % (ref 0–1)
BILIRUB SERPL-MCNC: 3.04 MG/DL (ref 0.2–1)
BUN SERPL-MCNC: 12 MG/DL (ref 5–25)
BUN SERPL-MCNC: 14 MG/DL (ref 5–25)
CALCIUM ALBUM COR SERPL-MCNC: 9.2 MG/DL (ref 8.3–10.1)
CALCIUM SERPL-MCNC: 8.1 MG/DL (ref 8.3–10.1)
CALCIUM SERPL-MCNC: 8.7 MG/DL (ref 8.3–10.1)
CHLORIDE SERPL-SCNC: 103 MMOL/L (ref 100–108)
CHLORIDE SERPL-SCNC: 98 MMOL/L (ref 100–108)
CO2 SERPL-SCNC: 25 MMOL/L (ref 21–32)
CO2 SERPL-SCNC: 27 MMOL/L (ref 21–32)
CREAT SERPL-MCNC: 0.85 MG/DL (ref 0.6–1.3)
CREAT SERPL-MCNC: 0.87 MG/DL (ref 0.6–1.3)
CRP SERPL HS-MCNC: >90 MG/L
EOSINOPHIL # BLD AUTO: 0.3 THOUSAND/ΜL (ref 0–0.61)
EOSINOPHIL NFR BLD AUTO: 3 % (ref 0–6)
ERYTHROCYTE [DISTWIDTH] IN BLOOD BY AUTOMATED COUNT: 12.5 % (ref 11.6–15.1)
EST. AVERAGE GLUCOSE BLD GHB EST-MCNC: 209 MG/DL
GFR SERPL CREATININE-BSD FRML MDRD: 95 ML/MIN/1.73SQ M
GFR SERPL CREATININE-BSD FRML MDRD: 96 ML/MIN/1.73SQ M
GLUCOSE SERPL-MCNC: 152 MG/DL (ref 65–140)
GLUCOSE SERPL-MCNC: 211 MG/DL (ref 65–140)
GLUCOSE SERPL-MCNC: 232 MG/DL (ref 65–140)
GLUCOSE SERPL-MCNC: 238 MG/DL (ref 65–140)
HBA1C MFR BLD: 8.9 %
HCT VFR BLD AUTO: 41.5 % (ref 36.5–49.3)
HGB BLD-MCNC: 14.1 G/DL (ref 12–17)
IMM GRANULOCYTES # BLD AUTO: 0.05 THOUSAND/UL (ref 0–0.2)
IMM GRANULOCYTES NFR BLD AUTO: 1 % (ref 0–2)
LYMPHOCYTES # BLD AUTO: 2.86 THOUSANDS/ΜL (ref 0.6–4.47)
LYMPHOCYTES NFR BLD AUTO: 28 % (ref 14–44)
MCH RBC QN AUTO: 29.2 PG (ref 26.8–34.3)
MCHC RBC AUTO-ENTMCNC: 34 G/DL (ref 31.4–37.4)
MCV RBC AUTO: 86 FL (ref 82–98)
MONOCYTES # BLD AUTO: 0.86 THOUSAND/ΜL (ref 0.17–1.22)
MONOCYTES NFR BLD AUTO: 9 % (ref 4–12)
NEUTROPHILS # BLD AUTO: 5.94 THOUSANDS/ΜL (ref 1.85–7.62)
NEUTS SEG NFR BLD AUTO: 58 % (ref 43–75)
NRBC BLD AUTO-RTO: 0 /100 WBCS
PLATELET # BLD AUTO: 236 THOUSANDS/UL (ref 149–390)
PMV BLD AUTO: 9.5 FL (ref 8.9–12.7)
POTASSIUM SERPL-SCNC: 3.5 MMOL/L (ref 3.5–5.3)
POTASSIUM SERPL-SCNC: 4.6 MMOL/L (ref 3.5–5.3)
PROT SERPL-MCNC: 6.6 G/DL (ref 6.4–8.2)
RBC # BLD AUTO: 4.83 MILLION/UL (ref 3.88–5.62)
SODIUM SERPL-SCNC: 132 MMOL/L (ref 136–145)
SODIUM SERPL-SCNC: 136 MMOL/L (ref 136–145)
WBC # BLD AUTO: 10.09 THOUSAND/UL (ref 4.31–10.16)

## 2022-01-26 PROCEDURE — 99222 1ST HOSP IP/OBS MODERATE 55: CPT | Performed by: PODIATRIST

## 2022-01-26 PROCEDURE — 80048 BASIC METABOLIC PNL TOTAL CA: CPT | Performed by: PHYSICIAN ASSISTANT

## 2022-01-26 PROCEDURE — 82948 REAGENT STRIP/BLOOD GLUCOSE: CPT

## 2022-01-26 PROCEDURE — 96367 TX/PROPH/DG ADDL SEQ IV INF: CPT

## 2022-01-26 PROCEDURE — 83036 HEMOGLOBIN GLYCOSYLATED A1C: CPT | Performed by: PHYSICIAN ASSISTANT

## 2022-01-26 PROCEDURE — 99236 HOSP IP/OBS SAME DATE HI 85: CPT | Performed by: INTERNAL MEDICINE

## 2022-01-26 PROCEDURE — 85025 COMPLETE CBC W/AUTO DIFF WBC: CPT | Performed by: PHYSICIAN ASSISTANT

## 2022-01-26 RX ORDER — ACETAMINOPHEN 325 MG/1
650 TABLET ORAL EVERY 6 HOURS PRN
Status: DISCONTINUED | OUTPATIENT
Start: 2022-01-26 | End: 2022-01-26 | Stop reason: HOSPADM

## 2022-01-26 RX ORDER — ONDANSETRON 2 MG/ML
4 INJECTION INTRAMUSCULAR; INTRAVENOUS EVERY 6 HOURS PRN
Status: DISCONTINUED | OUTPATIENT
Start: 2022-01-26 | End: 2022-01-26 | Stop reason: HOSPADM

## 2022-01-26 RX ORDER — NICOTINE 21 MG/24HR
1 PATCH, TRANSDERMAL 24 HOURS TRANSDERMAL DAILY
Status: DISCONTINUED | OUTPATIENT
Start: 2022-01-26 | End: 2022-01-26 | Stop reason: HOSPADM

## 2022-01-26 RX ORDER — LISINOPRIL 10 MG/1
10 TABLET ORAL DAILY
Status: DISCONTINUED | OUTPATIENT
Start: 2022-01-26 | End: 2022-01-26 | Stop reason: HOSPADM

## 2022-01-26 RX ORDER — SODIUM CHLORIDE, SODIUM GLUCONATE, SODIUM ACETATE, POTASSIUM CHLORIDE, MAGNESIUM CHLORIDE, SODIUM PHOSPHATE, DIBASIC, AND POTASSIUM PHOSPHATE .53; .5; .37; .037; .03; .012; .00082 G/100ML; G/100ML; G/100ML; G/100ML; G/100ML; G/100ML; G/100ML
100 INJECTION, SOLUTION INTRAVENOUS CONTINUOUS
Status: DISPENSED | OUTPATIENT
Start: 2022-01-26 | End: 2022-01-26

## 2022-01-26 RX ORDER — CALCIUM CARBONATE 200(500)MG
1000 TABLET,CHEWABLE ORAL DAILY PRN
Status: DISCONTINUED | OUTPATIENT
Start: 2022-01-26 | End: 2022-01-26 | Stop reason: HOSPADM

## 2022-01-26 RX ORDER — PANTOPRAZOLE SODIUM 40 MG/1
40 TABLET, DELAYED RELEASE ORAL
Status: DISCONTINUED | OUTPATIENT
Start: 2022-01-26 | End: 2022-01-26 | Stop reason: HOSPADM

## 2022-01-26 RX ORDER — SENNOSIDES 8.6 MG
1 TABLET ORAL
Status: DISCONTINUED | OUTPATIENT
Start: 2022-01-26 | End: 2022-01-26 | Stop reason: HOSPADM

## 2022-01-26 RX ORDER — ALPRAZOLAM 0.5 MG/1
0.5 TABLET ORAL 2 TIMES DAILY PRN
Status: DISCONTINUED | OUTPATIENT
Start: 2022-01-26 | End: 2022-01-26 | Stop reason: HOSPADM

## 2022-01-26 RX ORDER — CEPHALEXIN 500 MG/1
500 CAPSULE ORAL EVERY 6 HOURS SCHEDULED
Qty: 28 CAPSULE | Refills: 0 | Status: SHIPPED | OUTPATIENT
Start: 2022-01-26 | End: 2022-02-02

## 2022-01-26 RX ORDER — DICYCLOMINE HCL 20 MG
20 TABLET ORAL 3 TIMES DAILY PRN
Status: DISCONTINUED | OUTPATIENT
Start: 2022-01-26 | End: 2022-01-26 | Stop reason: HOSPADM

## 2022-01-26 RX ADMIN — INSULIN LISPRO 2 UNITS: 100 INJECTION, SOLUTION INTRAVENOUS; SUBCUTANEOUS at 08:58

## 2022-01-26 RX ADMIN — PANTOPRAZOLE SODIUM 40 MG: 40 TABLET, DELAYED RELEASE ORAL at 05:09

## 2022-01-26 RX ADMIN — INSULIN LISPRO 32 UNITS: 100 INJECTION, SOLUTION INTRAVENOUS; SUBCUTANEOUS at 08:59

## 2022-01-26 RX ADMIN — LISINOPRIL 10 MG: 10 TABLET ORAL at 08:57

## 2022-01-26 RX ADMIN — ENOXAPARIN SODIUM 40 MG: 40 INJECTION SUBCUTANEOUS at 08:57

## 2022-01-26 RX ADMIN — SODIUM CHLORIDE, SODIUM GLUCONATE, SODIUM ACETATE, POTASSIUM CHLORIDE, MAGNESIUM CHLORIDE, SODIUM PHOSPHATE, DIBASIC, AND POTASSIUM PHOSPHATE 100 ML/HR: .53; .5; .37; .037; .03; .012; .00082 INJECTION, SOLUTION INTRAVENOUS at 02:32

## 2022-01-26 RX ADMIN — VANCOMYCIN HYDROCHLORIDE 2000 MG: 1 INJECTION, POWDER, LYOPHILIZED, FOR SOLUTION INTRAVENOUS at 00:17

## 2022-01-26 RX ADMIN — ALPRAZOLAM 0.5 MG: 0.5 TABLET ORAL at 08:57

## 2022-01-26 RX ADMIN — INSULIN LISPRO 1 UNITS: 100 INJECTION, SOLUTION INTRAVENOUS; SUBCUTANEOUS at 11:53

## 2022-01-26 NOTE — CONSULTS
Consult - Yoselin Rosales LA Harpal 60 y o  male MRN: [id number]  Unit/Bed#: S -01 Encounter: [id number]    Assessment/Plan     Assessment:  1  Contusion right foot with blister  2  Nondisplaced proximal phalanx fracture right 3,4 digits  2  Previous left first ray amputation  3  DM neuropathy    Plan:  1  BLister was cleaned with betadyne and drained at bedside  Dry gauze applied  NO acute infection but high risk for developing cellulitis  Recommend 7 days oral antibiotic  Dressing instructions given to patient  No history of MRSA, keflex 500mg Q6 hours for 7 days should be fine    2  Toe Fractures are nondisplaced  Surgical shoe  He should rest and elevate as much as possible, if walking wear surgical shoe    3  Followup with his podiatrist later this week or next week    History of Present Illness     HPI:  Torri Acevedo is a 60 y o  male who presents with acute trauma and blistering to his right foot  He has medical history of diabetic neuropathy, previous amputation  He states a few days ago he kicked something with his right foot  His middle toes became very swollen and he developed a blister  Given his history of amputation he came to the emergency room yesterday  He had questionable cellulitis and was given 1 dose of antibiotic  He was admitted after an x-ray showed he had broken his toes  Patient has neuropathy in his feet and does not feel any pain but he is concerned with the swelling and blistering of the right foot  He has denied nausea fever vomiting or chills       Consults  Review of Systems   Constitutional: Negative  HENT: Negative  Eyes: Negative  Respiratory: Negative  Cardiovascular: Negative  Gastrointestinal: Negative  Musculoskeletal:  Swelling right foot, previous amputation left great toe   Skin:  Hemorrhagic blister right forefoot but no open wound or drainage   Neurological:  Numbness in feet      Psych: negative      Historical Information   Past Medical History:   Diagnosis Date    Diabetes mellitus ([facility] [address] )     Gastroparesis     Hypertension     Neuropathy      Past Surgical History:   Procedure Laterality Date    SD AMPUTATION FOOT,TRANSMETATARSAL Left 10/30/2017    Procedure: AMPUTATION TRANSMETATARSAL (TMA);  Surgeon: Ondina Tamez DPM;  Location[facility];  Service: Podiatry    [address] 11/3/2017    Procedure: AMPUTATION TRANSMETATARSAL (TMA), left foot;  Surgeon: Ondina Tamez DPM;  [facility];  Service: Podiatry     Social History   Social History     Substance and Sexual Activity   Alcohol Use No     Social History     Substance and Sexual Activity   Drug Use Yes    Types: Marijuana     Social History     Tobacco Use   Smoking Status Current Some Day Smoker    Types: Cigars   Smokeless Tobacco Never Used     Family History: History reviewed  No pertinent family history      Meds/Allergies   Medications Prior to Admission   Medication    ALPRAZolam (XANAX) 0 5 mg tablet    atorvastatin (LIPITOR) 20 mg tablet    dicyclomine (BENTYL) 20 mg tablet    insulin lispro (HumaLOG KwikPen) 100 units/mL injection pen    lisinopril (ZESTRIL) 10 mg tablet    metFORMIN (GLUCOPHAGE-XR) 750 mg 24 hr tablet    metoclopramide (REGLAN) 10 mg tablet    metoclopramide (Reglan) 10 mg tablet    ondansetron (Zofran ODT) 4 mg disintegrating tablet    ondansetron (Zofran ODT) 4 mg disintegrating tablet    ondansetron (ZOFRAN-ODT) 4 mg disintegrating tablet    pantoprazole (PROTONIX) 40 mg tablet    pantoprazole (PROTONIX) 40 mg tablet     No Known Allergies    Objective   First Vitals:   Blood Pressure: 166/92 (01/25/22 2222)  Pulse: (!) 107 (01/25/22 2222)  Temperature: 99 1 °F (37 3 °C) (01/25/22 2222)  Temp Source: Oral (01/25/22 2222)  Respirations: 20 (01/25/22 2222)  Height: 6' 3" (190 5 cm) (01/26/22 0030)  Weight - Scale: 106 kg (233 lb 11 oz) (01/26/22 0030)  SpO2: 95 % (01/25/22 2222)    Current Vitals:   Blood Pressure: 143/82 (01/26/22 0135)  Pulse: 86 (01/26/22 0135)  Temperature: 98 9 °F (37 2 °C) (01/26/22 0135)  Temp Source: Oral (01/26/22 0135)  Respirations: 18 (01/26/22 0135)  Height: 6' 3" (190 5 cm) (01/26/22 0030)  Weight - Scale: 106 kg (233 lb 11 oz) (01/26/22 0030)  SpO2: 96 % (01/26/22 0135)        /82 (BP Location: Left arm)   Pulse 86   Temp 98 9 °F (37 2 °C) (Oral)   Resp 18   Ht 6' 3" (1 905 m)   Wt 106 kg (233 lb 11 oz)   SpO2 96%   BMI 29 21 kg/m²   Physical Exam:  General:    Alert, cooperative, no distress   Head:     Normocephalic, without obvious abnormality, atraumatic                   Skin:   Hemorrhagic blister to the dorsal aspect of the right forefoot at the base of the 3rd 4th digits  No drainage  No significant erythema or cellulitis  There is edema throughout the forefoot  Ecchymosis noted to the 3rd and 4th toes       Lungs:     Respirations unlabored   Chest wall:    No tenderness or deformity   Heart/vasc:    Regular rate and rhythm  Palpable pedal pulses  Capillary refills brisk to all toes  Abdomen:     Soft, non-tender, no distention           Lower MSK:   Hammertoe deformity noted but this is chronic  No malalignment of the digits  On the left foot the 1st ray is amputated and stable  No calf pain with compression     Psychiatric:  AAOx3, no depression           Neurologic:   Lack of pinprick and vibratory sensation bilateral lower extremity     Lab Results:   Admission on 01/25/2022   Component Date Value    WBC 01/25/2022 12 91*    RBC 01/25/2022 4 77     Hemoglobin 01/25/2022 13 9     Hematocrit 01/25/2022 40 4     MCV 01/25/2022 85     MCH 01/25/2022 29 1     MCHC 01/25/2022 34 4     RDW 01/25/2022 12 5     MPV 01/25/2022 9 1     Platelets 69/73/7102 249     nRBC 01/25/2022 0     Neutrophils Relative 01/25/2022 68     Immat GRANS % 01/25/2022 1     Lymphocytes Relative 01/25/2022 19     Monocytes Relative 01/25/2022 9     Eosinophils Relative 01/25/2022 2     Basophils Relative 01/25/2022 1     Neutrophils Absolute 01/25/2022 9 02*    Immature Grans Absolute 01/25/2022 0 06     Lymphocytes Absolute 01/25/2022 2 41     Monocytes Absolute 01/25/2022 1 13     Eosinophils Absolute 01/25/2022 0 22     Basophils Absolute 01/25/2022 0 07     Protime 01/25/2022 12 0     INR 01/25/2022 0 89     PTT 01/25/2022 18*    Sodium 01/25/2022 132*    Potassium 01/25/2022 4 6     Chloride 01/25/2022 98*    CO2 01/25/2022 25     ANION GAP 01/25/2022 9     BUN 01/25/2022 14     Creatinine 01/25/2022 0 85     Glucose 01/25/2022 238*    Calcium 01/25/2022 8 7     Corrected Calcium 01/25/2022 9 2     AST 01/25/2022 33     ALT 01/25/2022 26     Alkaline Phosphatase 01/25/2022 68     Total Protein 01/25/2022 6 6     Albumin 01/25/2022 3 4*    Total Bilirubin 01/25/2022 3 04*    eGFR 01/25/2022 96     LACTIC ACID 01/25/2022 1 0     CRP, High Sensitivity 01/25/2022 >90 00     pH, Eddy 01/25/2022 7 420*    pCO2, Eddy 01/25/2022 42 9     pO2, Eddy 01/25/2022 64 9*    HCO3, Eddy 01/25/2022 27 2     Base Excess, Eddy 01/25/2022 2 3     O2 Content, Eddy 01/25/2022 20 2     O2 HGB, VENOUS 01/25/2022 91 0*    Total CK 01/25/2022 482*    BETA-HYDROXYBUTYRATE 01/25/2022 0 3     POC Glucose 01/25/2022 217*    CK-MB Index 01/25/2022 1 0     CK-MB 01/25/2022 4 9                              Imaging: I have personally reviewed pertinent films in PACS  There appears to be nondisplaced fractures of the metaphyseal portion of the proximal phalanx of the 3rd and 4th digit on the right foot  Code Status: Level 1 - Full Code        Portions of the record may have been created with voice recognition software  Occasional wrong word or "sound a like" substitutions may have occurred due to the inherent limitations of voice recognition software   Read the chart carefully and recognize, using context, where substitutions have occurred

## 2022-01-26 NOTE — ASSESSMENT & PLAN NOTE
POA, SIRS e/b tachycardia, tachpnea, leukocytosis  ER felt sepsis 2/2 foot infection however low suspicion for infection at this time     [facility] follow up bl cx x2  Normal lactic acid   Bolused 1 L IVF in ER

## 2022-01-26 NOTE — ED PROVIDER NOTES
History  Chief Complaint   Patient presents with    Foot Injury     pt states yesterday he stubbed his R foot, hx of diabetes and toe removal on other foot, pt states he has a blister/discoloration on toes, +swelling/redness/blistering     Patient is a 60year old male who stubbed his R foot yesterday  (+) Increased pain and redness and swelling and blistering noted today  Last Tdap was on 6/18/18 as per Epic  No fever  Has diabetic neuropathy  Was last seen in this ED on 12/29/21 for abdominal pain, N/V and gastroparesis  Has had prior left foot MT amputation  [facility] website checked on this patient and last Rx filled was on 1/21/22 for xanax for 30 day supply  History provided by:  Patient and spouse   used: No        Prior to Admission Medications   Prescriptions Last Dose Informant Patient Reported? Taking?    ALPRAZolam (XANAX) 0 5 mg tablet   No No   Sig: Take 1 tablet by mouth 2 (two) times a day as needed for anxiety for up to 10 days   atorvastatin (LIPITOR) 20 mg tablet   Yes No   Sig: Take 20 mg by mouth daily   Patient not taking: Reported on 7/4/2021   dicyclomine (BENTYL) 20 mg tablet   No No   Sig: Take 1 tablet (20 mg total) by mouth 3 (three) times a day as needed (Abdominal cramping, diarrhea)   insulin lispro (HumaLOG KwikPen) 100 units/mL injection pen   No No   Sig: Inject 32 Units under the skin 2 (two) times a day with meals   lisinopril (ZESTRIL) 10 mg tablet   Yes No   Sig: Take 10 mg by mouth daily   metFORMIN (GLUCOPHAGE-XR) 750 mg 24 hr tablet   Yes No   Sig: Take 750 mg by mouth daily with breakfast   metoclopramide (REGLAN) 10 mg tablet   No No   Sig: Take 1 tablet (10 mg total) by mouth 3 (three) times a day as needed (nausea/ vomiting) As needed for nausea   Patient not taking: Reported on 7/4/2021   metoclopramide (Reglan) 10 mg tablet   No No   Sig: Take 1 tablet (10 mg total) by mouth 3 (three) times a day as needed (nausea and vomiting)   ondansetron (ZOFRAN-ODT) 4 mg disintegrating tablet   No No   Sig: Take 1 tablet (4 mg total) by mouth every 6 (six) hours as needed for nausea or vomiting   ondansetron (Zofran ODT) 4 mg disintegrating tablet   No No   Sig: Take 1 tablet (4 mg total) by mouth every 6 (six) hours as needed for nausea or vomiting   ondansetron (Zofran ODT) 4 mg disintegrating tablet   No No   Sig: Take 1 tablet (4 mg total) by mouth every 6 (six) hours as needed for nausea or vomiting   pantoprazole (PROTONIX) 40 mg tablet   Yes No   Sig: Take 40 mg by mouth daily   pantoprazole (PROTONIX) 40 mg tablet   No No   Sig: Take 1 tablet (40 mg total) by mouth daily      Facility-Administered Medications: None       Past Medical History:   Diagnosis Date    Diabetes mellitus ([facility] [address] )     Gastroparesis     Hypertension     Neuropathy        Past Surgical History:   Procedure Laterality Date    [place] AMPUTATION FOOT,TRANSMETATARSAL Left 10/30/2017    Procedure: AMPUTATION TRANSMETATARSAL (TMA);  Surgeon: Eva Arndt DPM;  [facility]: [facility];  Service: Podiatry    [address] [place] [address] Left 11/3/2017    Procedure: AMPUTATION TRANSMETATARSAL (TMA), left foot;  Surgeon: Eva Arndt DPM;  [facility];  Service: Podiatry       History reviewed  No pertinent family history  I have reviewed and agree with the history as documented  E-Cigarette/Vaping    E-Cigarette Use Never User      E-Cigarette/Vaping Substances     Social History     Tobacco Use    Smoking status: Current Some Day Smoker     Types: Cigars    Smokeless tobacco: Never Used   Vaping Use    Vaping Use: Never used   Substance Use Topics    Alcohol use: No    Drug use: Yes     Types: Marijuana       Review of Systems   Constitutional: Negative for fever  Musculoskeletal: Positive for myalgias (R foot pain and swelling)  Skin: Positive for color change and wound  Neurological: Positive for numbness (diabetic neuropathy)     All other systems reviewed and are negative  Physical Exam  Physical Exam  Vitals and nursing note reviewed  Constitutional:       General: He is in acute distress (moderate)  HENT:      Head: Normocephalic and atraumatic  Mouth/Throat:      Mouth: Mucous membranes are moist    Eyes:      General: No scleral icterus  Cardiovascular:      Rate and Rhythm: Regular rhythm  Tachycardia present  Heart sounds: Normal heart sounds  No murmur heard  Pulmonary:      Effort: Pulmonary effort is normal  No respiratory distress  Breath sounds: Normal breath sounds  Abdominal:      General: Bowel sounds are normal       Palpations: Abdomen is soft  Tenderness: There is no abdominal tenderness  Musculoskeletal:         General: Swelling (R foot), tenderness (R foot and multiple toes) and signs of injury (R foot) present  No deformity (No R foot)  Cervical back: Normal range of motion and neck supple  Right lower leg: Edema present  Skin:     General: Skin is warm and dry  Findings: Bruising (multiple R toes and foot) and erythema (R foot with swelling and blister noted) present  Neurological:      Mental Status: He is alert and oriented to person, place, and time     Psychiatric:         Mood and Affect: Mood normal          Vital Signs  ED Triage Vitals   Temperature Pulse Respirations Blood Pressure SpO2   01/25/22 2222 01/25/22 2222 01/25/22 2222 01/25/22 2222 01/25/22 2222   99 1 °F (37 3 °C) (!) 107 20 166/92 95 %      Temp Source Heart Rate Source Patient Position - Orthostatic VS BP Location FiO2 (%)   01/25/22 2222 01/25/22 2222 01/25/22 2222 01/25/22 2222 --   Oral Monitor Sitting Left arm       Pain Score       01/25/22 2337       No Pain           Vitals:    01/25/22 2222 01/26/22 0030   BP: 166/92 122/65   Pulse: (!) 107 84   Patient Position - Orthostatic VS: Sitting Lying         Visual Acuity      ED Medications  Medications   sodium chloride 0 9 % infusion (125 mL/hr Intravenous New Bag 1/25/22 2302)   vancomycin (VANCOCIN) 2,000 mg in sodium chloride 0 9 % 500 mL IVPB (2,000 mg Intravenous New Bag 1/26/22 0017)   sodium chloride 0 9 % bolus 1,000 mL (0 mL Intravenous Stopped 1/26/22 0017)   acetaminophen (TYLENOL) tablet 975 mg (975 mg Oral Given 1/25/22 2301)   ampicillin-sulbactam (UNASYN) 3 g in sodium chloride 0 9 % 100 mL IVPB (0 g Intravenous Stopped 1/26/22 0006)       Diagnostic Studies  Results Reviewed     Procedure Component Value Units Date/Time    High sensitivity CRP [[id number]] Collected: 01/25/22 2251    Lab Status: Final result Specimen: Blood from Arm, Left Updated: 01/26/22 0106     CRP, High Sensitivity >90 00 mg/L     Narrative:            HsCRP Level       Relative Risk           <1 0 mg/L          Low           1 0 to 3 0 mg/L    Average           >3 0 mg/L          High        Comprehensive metabolic panel [[id number]]  (Abnormal) Collected: 01/25/22 2251    Lab Status: Final result Specimen: Blood from Arm, Left Updated: 01/26/22 0022     Sodium 132 mmol/L      Potassium 4 6 mmol/L      Chloride 98 mmol/L      CO2 25 mmol/L      ANION GAP 9 mmol/L      BUN 14 mg/dL      Creatinine 0 85 mg/dL      Glucose 238 mg/dL      Calcium 8 7 mg/dL      Corrected Calcium 9 2 mg/dL      AST 33 U/L      ALT 26 U/L      Alkaline Phosphatase 68 U/L      Total Protein 6 6 g/dL      Albumin 3 4 g/dL      Total Bilirubin 3 04 mg/dL      eGFR 96 ml/min/1 73sq m     Narrative:      [place] guidelines for Chronic Kidney Disease (CKD):     Stage 1 with normal or high GFR (GFR > 90 mL/min/1 73 square meters)    Stage 2 Mild CKD (GFR = 60-89 mL/min/1 73 square meters)    Stage 3A Moderate CKD (GFR = 45-59 mL/min/1 73 square meters)    Stage 3B Moderate CKD (GFR = 30-44 mL/min/1 73 square meters)    Stage 4 Severe CKD (GFR = 15-29 mL/min/1 73 square meters)    Stage 5 End Stage CKD (GFR <15 mL/min/1 73 square meters)  Note: GFR calculation is accurate only with a steady state creatinine    CBC and differential [[id number]]  (Abnormal) Collected: 01/25/22 2337    Lab Status: Final result Specimen: Blood from Arm, Left Updated: 01/25/22 2357     WBC 12 91 Thousand/uL      RBC 4 77 Million/uL      Hemoglobin 13 9 g/dL      Hematocrit 40 4 %      MCV 85 fL      MCH 29 1 pg      MCHC 34 4 g/dL      RDW 12 5 %      MPV 9 1 fL      Platelets 309 Thousands/uL      nRBC 0 /100 WBCs      Neutrophils Relative 68 %      Immat GRANS % 1 %      Lymphocytes Relative 19 %      Monocytes Relative 9 %      Eosinophils Relative 2 %      Basophils Relative 1 %      Neutrophils Absolute 9 02 Thousands/µL      Immature Grans Absolute 0 06 Thousand/uL      Lymphocytes Absolute 2 41 Thousands/µL      Monocytes Absolute 1 13 Thousand/µL      Eosinophils Absolute 0 22 Thousand/µL      Basophils Absolute 0 07 Thousands/µL     CKMB [[id number]]  (Normal) Collected: 01/25/22 2251    Lab Status: Final result Specimen: Blood from Arm, Left Updated: 01/25/22 2352     CK-MB Index 1 0 %      CK-MB 4 9 ng/mL     CK Total with Reflex CKMB [[id number]]  (Abnormal) Collected: 01/25/22 2251    Lab Status: Final result Specimen: Blood from Arm, Left Updated: 01/25/22 2352     Total  U/L     Lactic acid [[id number]]  (Normal) Collected: 01/25/22 2258    Lab Status: Final result Specimen: Blood from Arm, Right Updated: 01/25/22 2345     LACTIC ACID 1 0 mmol/L     Narrative:      Result may be elevated if tourniquet was used during collection      Juanita Khan [[id number]]  (Normal) Collected: 01/25/22 2251    Lab Status: Final result Specimen: Blood from Arm, Left Updated: 01/25/22 2320     Protime 12 0 seconds      INR 0 89    APTT [[id number]]  (Abnormal) Collected: 01/25/22 2251    Lab Status: Final result Specimen: Blood from Arm, Left Updated: 01/25/22 2320     PTT 18 seconds     Blood gas, venous [[id number]]  (Abnormal) Collected: 01/25/22 2258    Lab Status: Final result Specimen: Blood from Arm, Right Updated: 01/25/22 2309     pH, Eddy 7 420     pCO2, Eddy 42 9 mm Hg      pO2, Eddy 64 9 mm Hg      HCO3, Eddy 27 2 mmol/L      Base Excess, Eddy 2 3 mmol/L      O2 Content, Eddy 20 2 ml/dL      O2 HGB, VENOUS 91 0 %     Blood culture #1 [[id number]] Collected: 01/25/22 2258    Lab Status: In process Specimen: Blood from Arm, Right Updated: 01/25/22 2302    Beta Hydroxybutyrate [[id number]]  (Normal) Collected: 01/25/22 2251    Lab Status: Final result Specimen: Blood from Arm, Left Updated: 01/25/22 2301     BETA-HYDROXYBUTYRATE 0 3 mmol/L     Fingerstick Glucose (POCT) [[id number]]  (Abnormal) Collected: 01/25/22 2256    Lab Status: Final result Updated: 01/25/22 2257     POC Glucose 217 mg/dl     Blood culture #2 [[id number]] Collected: 01/25/22 2251    Lab Status: In process Specimen: Blood from Arm, Left Updated: 01/25/22 2254                 XR foot 3+ views RIGHT   ED Interpretation by Corey Fowler MD (01/25 2259)   No fx, fb, dislocation or evidence of osteomyelitis read by me  XR chest 1 view portable   ED Interpretation by Corey Fowler MD (01/25 2300)   Elevated R hemidiaphragm read by me                    Procedures  ECG 12 Lead Documentation Only    Date/Time: 1/25/2022 11:07 PM  Performed by: Corey Fowler MD  Authorized by: Corey Fowler MD     Indications / Diagnosis:  Sepsis  ECG reviewed by me, the ED Provider: yes    Patient location:  ED  Previous ECG:     Previous ECG:  Compared to current    Comparison ECG info:  9/28/21    Similarity:  No change  Quality:     Tracing quality:  Limited by artifact  Rate:     ECG rate:  97    ECG rate assessment: normal    Rhythm:     Rhythm: sinus rhythm    Ectopy:     Ectopy: none    QRS:     QRS axis:  Normal    QRS intervals:  Normal  Conduction:     Conduction: normal    ST segments:     ST segments:  Normal  T waves:     T waves: normal               ED Course  ED Course as of 01/26/22 0110   Tue Jan 25, 2022 2317 EKG and x-rays d/w patient and wife with patient's permission  4437 Total CK(!): 482  IVFs being given  Wed Jan 26, 2022   0036 WBC(!): 12 91  IV abx being given  0036 Labs d/w patient and wife  Initial Sepsis Screening     Row Name 01/26/22 0034                Is the patient's history suggestive of a new or worsening infection? Yes (Proceed)  -AO        Suspected source of infection soft tissue  -AO        Are two or more of the following signs & symptoms of infection both present and new to the patient? Yes (Proceed)  -AO        Indicate SIRS criteria Tachycardia > 90 bpm;Leukocytosis (WBC > 71544 IJL)  -AO        If the answer is yes to both questions, suspicion of sepsis is present --        If severe sepsis is present AND tissue hypoperfusion perists in the hour after fluid resuscitation or lactate > 4, the patient meets criteria for SEPTIC SHOCK --        Are any of the following organ dysfunction criteria present within 6 hours of suspected infection and SIRS criteria that are NOT considered to be chronic conditions? No  -AO        Organ dysfunction --        Date of presentation of severe sepsis --        Time of presentation of severe sepsis --        Tissue hypoperfusion persists in the hour after crystalloid fluid administration, evidenced, by either: --        Was hypotension present within one hour of the conclusion of crystalloid fluid administration? --        Date of presentation of septic shock --        Time of presentation of septic shock --              User Key  (r) = Recorded By, (t) = Taken By, (c) = Cosigned By    [address] Name Provider Jamaal Hood MD Physician                SBIRT 20yo+      Most Recent Value   SBIRT (23 yo +)    In order to provide better care to our patients, we are screening all of our patients for alcohol and drug use  Would it be okay to ask you these screening questions?  Yes Filed at: 01/25/2022 6573   Initial Alcohol Screen: US AUDIT-C     1  How often do you have a drink containing alcohol? 0 Filed at: 01/25/2022 2257   2  How many drinks containing alcohol do you have on a typical day you are drinking? 0 Filed at: 01/25/2022 2257   3a  Male UNDER 65: How often do you have five or more drinks on one occasion? 0 Filed at: 01/25/2022 2257   Audit-C Score 0 Filed at: 01/25/2022 2257   IMELDA: How many times in the past year have you    Used an illegal drug or used a prescription medication for non-medical reasons? Never Filed at: 01/25/2022 2257                    MDM  Number of Diagnoses or Management Options  Diagnosis management comments: DDX including but not limited to: cellulitis, osteomyelitis, lymphangitis, folliculitis, carbuncle, abscess, rhabdomyolysis, necrotizing fasciitis, allergic reaction, fracture, contusion, sprain, strain, metabolic abnormality; doubt dislocation, angioedema, DVT          Amount and/or Complexity of Data Reviewed  Clinical lab tests: ordered and reviewed  Tests in the radiology section of CPT®: ordered and reviewed  Decide to obtain previous medical records or to obtain history from someone other than the patient: yes  Obtain history from someone other than the patient: yes  Review and summarize past medical records: yes  Discuss the patient with other providers: yes  Independent visualization of images, tracings, or specimens: yes        Disposition  Final diagnoses:   [facility]eps[facility] ([facility]   Contusion of right foot, initial encounter   Cellulitis of right foot   Blister of right foot, initial encounter     Time reflects when diagnosis was documented in both MDM as applicable and the Disposition within this note     Time User Action Codes Description Comment    1/26/2022 12:35 AM Mlaena Gio Add [A41 9] Sepsis ([facility] [address] )     1/26/2022 12:35 AM Malena Gio Add [S90 [id number]] Contusion of right foot, initial encounter     1/26/2022 12:35 AM Malena Gio Add [L03 [id number]] Cellulitis of right foot     1/26/2022 12:36 AM Antonio Hernandez [A01 [id number]] Blister of right foot, initial encounter       ED Disposition     ED Disposition Condition Date/Time Comment    Admit Stable Wed Jan 26, 2022 12:45 AM Case was discussed with AL HEDRICK  and the patient's admission status was agreed to be Admission Status: inpatient status to the service of Dr Jonah Davenport   Follow-up Information    None         Patient's Medications   Discharge Prescriptions    No medications on file       No discharge procedures on file      PDMP Review       Value Time User    PDMP Reviewed  Yes 1/25/2022 10:27 PM Nicholas Odell MD          ED Provider  Electronically Signed by           Nicholas Odell MD  01/26/22 Reema Licona MD  01/26/22 0110

## 2022-01-26 NOTE — PLAN OF CARE
Problem: MOBILITY - ADULT  Goal: Maintain or return to baseline ADL function  Description: INTERVENTIONS:  -  Assess patient's ability to carry out ADLs; assess patient's baseline for ADL function and identify physical deficits which impact ability to perform ADLs (bathing, care of mouth/teeth, toileting, grooming, dressing, etc )  - Assess/evaluate cause of self-care deficits   - Assess range of motion  - Assess patient's mobility; develop plan if impaired  - Assess patient's need for assistive devices and provide as appropriate  - Encourage maximum independence but intervene and supervise when necessary  - Involve family in performance of ADLs  - Assess for home care needs following discharge   - Consider OT consult to assist with ADL evaluation and planning for discharge  - Provide patient education as appropriate  Outcome: Progressing  Goal: Maintains/Returns to pre admission functional level  Description: INTERVENTIONS:  - Perform BMAT or MOVE assessment daily    - Set and communicate daily mobility goal to care team and patient/family/caregiver  - Collaborate with rehabilitation services on mobility goals if consulted  - Perform Range of Motion 4 times a day  - Reposition patient every 2 hours    - Dangle patient 4 times a day  - Stand patient 4 times a day  - Ambulate patient 4 times a day  - Out of bed to chair 4 times a day   - Out of bed for meals 3 times a day  - Out of bed for toileting  - Record patient progress and toleration of activity level   Outcome: Progressing

## 2022-01-26 NOTE — H&P
[address] [facility] [place] 1965, 60 y o  male MRN: [id number]  Unit/Bed#: S -01 Encounter: [id number]  Primary Care Provider: Miriam Abebe MD   Date and time admitted to hospital: 1/25/2022 10:23 PM    * Injury of right foot  Assessment & Plan  Pt presents with right foot pain and blister after walking into a metal pole  Given vanco and unasyn in ER though I do not appreciate cellulitis on exam    XR negative but will follow up official read   CRP >90 00   Consult podiatry   Last TdAp 2018    SIRS (systemic inflammatory response syndrome) (HCC)  Assessment & Plan  POA, SIRS e/b tachycardia, tachpnea, leukocytosis  ER felt sepsis 2/2 foot infection however low suspicion for infection at this time  [place] follow up bl cx x2  Normal lactic acid   Bolused 1 L IVF in ER      Tobacco abuse  Assessment & Plan  Counseled on cessation  NRT     Uncontrolled type 2 diabetes mellitus with foot ulcer, without long-term current use of insulin [facility])  Assessment & Plan  Lab Results   Component Value Date    HGBA1C 7 8 (H) 09/28/2021     Recent Labs     01/25/22  2256   POCGLU 217*     Blood Sugar Average: Last 72 hrs:  (P) 217   Hold metformin  Continue humalog 32u BID w meals   Add SSI, diabetic diet     Essential hypertension  Assessment & Plan  Continue lisinopril     VTE Pharmacologic Prophylaxis: VTE Score: 4 Moderate Risk (Score 3-4) - Pharmacological DVT Prophylaxis Ordered: enoxaparin (Lovenox)  Code Status: Level 1 - Full Code   Discussion with family: Patient declined call to   Anticipated Length of Stay: Patient will be admitted on an observation basis with an anticipated length of stay of less than 2 midnights secondary to foot injury requiring podiatry eval     Total Time for Visit, including Counseling / Coordination of Care: 45 minutes Greater than 50% of this total time spent on direct patient counseling and coordination of care      Chief Complaint: foot injury     History of Present Illness:  Naz Horn is a 60 y o  male with a PMH of DM, HTN, HLD, gastroparesis, prior DM foot infections, peripheral neuropathy who presents with right foot injury  Patient reports walking in his basement when he stubbed his right forefoot on a metal pole  States this occurred on Monday night  He has since developed throbbing pain, ecchymoses of the right forefoot and a new blister at the base of the 2nd and 3rd tarsals  No fevers or chills  Thought that he had a mild gastroparesis flare today and has had poor oral intake  Reported nausea but no vomiting and thinks his symptoms have passed was afraid he was going to lose his toes so he came to the ER  Follows podiatry Dr [place] Connelly  Review of Systems:  Review of Systems   Constitutional: Negative for chills and fever  HENT: Negative for congestion  Gastrointestinal: Positive for nausea  Negative for abdominal pain, diarrhea and vomiting  Genitourinary: Negative for difficulty urinating and dysuria  Musculoskeletal: Positive for arthralgias  Negative for gait problem  Skin: Positive for wound  Neurological: Negative for dizziness, weakness and light-headedness  Psychiatric/Behavioral: Negative for confusion  Past Medical and Surgical History:   Past Medical History:   Diagnosis Date    Diabetes mellitus ([facility] [address] )     Gastroparesis     Hypertension     Neuropathy        Past Surgical History:   Procedure Laterality Date    FL AMPUTATION FOOT,TRANSMETATARSAL Left 10/30/2017    Procedure: AMPUTATION TRANSMETATARSAL (TMA);  Surgeon: Emely Parker DPM;  Location: [facility];  Service: Podiatry    [address] [place] [address] Left 11/3/2017    Procedure: AMPUTATION TRANSMETATARSAL (TMA), left foot;  Surgeon: Emely Parker DPM;  Location: [facility];  Service: Podiatry       Meds/Allergies:  Prior to Admission medications    Medication Sig Start Date End Date Taking?  Authorizing Provider ALPRAZolam (XANAX) 0 5 mg tablet Take 1 tablet by mouth 2 (two) times a day as needed for anxiety for up to 10 days 11/7/17 7/4/21  Con Naqvi MD   atorvastatin (LIPITOR) 20 mg tablet Take 20 mg by mouth daily  Patient not taking: Reported on 7/4/2021    Historical Provider, MD   dicyclomine (BENTYL) 20 mg tablet Take 1 tablet (20 mg total) by mouth 3 (three) times a day as needed (Abdominal cramping, diarrhea) 9/28/21   Magda rUiarte DO   insulin lispro (HumaLOG KwikPen) 100 units/mL injection pen Inject 32 Units under the skin 2 (two) times a day with meals 7/5/21   Bird Nguyen DO   lisinopril (ZESTRIL) 10 mg tablet Take 10 mg by mouth daily    Historical Provider, MD   metFORMIN (GLUCOPHAGE-XR) 750 mg 24 hr tablet Take 750 mg by mouth daily with breakfast    Historical Provider, MD   metoclopramide (REGLAN) 10 mg tablet Take 1 tablet (10 mg total) by mouth 3 (three) times a day as needed (nausea/ vomiting) As needed for nausea  Patient not taking: Reported on 7/4/2021 3/14/19   Jackie Marti MD   metoclopramide (Reglan) 10 mg tablet Take 1 tablet (10 mg total) by mouth 3 (three) times a day as needed (nausea and vomiting) 12/29/21   Kishore Baptiste PA-C   ondansetron (Zofran ODT) 4 mg disintegrating tablet Take 1 tablet (4 mg total) by mouth every 6 (six) hours as needed for nausea or vomiting 9/28/21   Magda Uriarte DO   ondansetron (Zofran ODT) 4 mg disintegrating tablet Take 1 tablet (4 mg total) by mouth every 6 (six) hours as needed for nausea or vomiting 12/29/21   Kishore Baptiste PA-C   ondansetron (ZOFRAN-ODT) 4 mg disintegrating tablet Take 1 tablet (4 mg total) by mouth every 6 (six) hours as needed for nausea or vomiting 7/5/21   Bird Nguyen DO   pantoprazole (PROTONIX) 40 mg tablet Take 40 mg by mouth daily    Historical Provider, MD   pantoprazole (PROTONIX) 40 mg tablet Take 1 tablet (40 mg total) by mouth daily 12/29/21   Kishore Baptiste PA-C     I have reviewed home medications with patient personally  Allergies: No Known Allergies    Social History:  Marital Status: /Civil Union   Occupation:   Patient Pre-hospital Living Situation: Home  Patient Pre-hospital Level of Mobility: walks  Patient Pre-hospital Diet Restrictions:   Substance Use History:   Social History     Substance and Sexual Activity   Alcohol Use No     Social History     Tobacco Use   Smoking Status Current Some Day Smoker    Types: Cigars   Smokeless Tobacco Never Used     Social History     Substance and Sexual Activity   Drug Use Yes    Types: Marijuana       Family History:  History reviewed  No pertinent family history  Physical Exam:     Vitals:   Blood Pressure: 143/82 (01/26/22 0135)  Pulse: 86 (01/26/22 0135)  Temperature: 98 9 °F (37 2 °C) (01/26/22 0135)  Temp Source: Oral (01/26/22 0135)  Respirations: 18 (01/26/22 0135)  Height: 6' 3" (190 5 cm) (01/26/22 0030)  Weight - Scale: 106 kg (233 lb 11 oz) (01/26/22 0030)  SpO2: 96 % (01/26/22 0135)    Physical Exam  Constitutional:       Appearance: Normal appearance  He is obese  He is not ill-appearing  HENT:      Head: Normocephalic and atraumatic  Mouth/Throat:      Mouth: Mucous membranes are moist    Eyes:      Extraocular Movements: Extraocular movements intact  Cardiovascular:      Rate and Rhythm: Normal rate and regular rhythm  Pulmonary:      Effort: Pulmonary effort is normal       Breath sounds: Normal breath sounds  Abdominal:      General: Bowel sounds are normal       Palpations: Abdomen is soft  Musculoskeletal:         General: Deformity (left hallux amp ) present  No swelling  Normal range of motion  Cervical back: Normal range of motion  Right lower leg: Edema present  Skin:     General: Skin is warm and dry  Findings: Bruising (bruising of right forefoot and tarsals with blister at bases of 2nd and 3rd tarsalsh) present  Neurological:      General: No focal deficit present  Mental Status: He is alert  Psychiatric:         Mood and Affect: Mood normal          Behavior: Behavior normal        Additional Data:     Lab Results:  Results from last 7 days   Lab Units 01/25/22  2337   WBC Thousand/uL 12 91*   HEMOGLOBIN g/dL 13 9   HEMATOCRIT % 40 4   PLATELETS Thousands/uL 249   NEUTROS PCT % 68   LYMPHS PCT % 19   MONOS PCT % 9   EOS PCT % 2     Results from last 7 days   Lab Units 01/25/22  2251   SODIUM mmol/L 132*   POTASSIUM mmol/L 4 6   CHLORIDE mmol/L 98*   CO2 mmol/L 25   BUN mg/dL 14   CREATININE mg/dL 0 85   ANION GAP mmol/L 9   CALCIUM mg/dL 8 7   ALBUMIN g/dL 3 4*   TOTAL BILIRUBIN mg/dL 3 04*   ALK PHOS U/L 68   ALT U/L 26   AST U/L 33   GLUCOSE RANDOM mg/dL 238*     Results from last 7 days   Lab Units 01/25/22  2251   INR  0 89     Results from last 7 days   Lab Units 01/25/22  2256   POC GLUCOSE mg/dl 217*         Results from last 7 days   Lab Units 01/25/22  2258   LACTIC ACID mmol/L 1 0       Imaging: Reviewed radiology reports from this admission including: xray(s)  XR foot 3+ views RIGHT   ED Interpretation by Rylee Parks MD (01/25 2259)   No fx, fb, dislocation or evidence of osteomyelitis read by me  XR chest 1 view portable   ED Interpretation by Rylee Parks MD (01/25 2300)   Elevated R hemidiaphragm read by me  EKG and Other Studies Reviewed on Admission:   · EKG: NSR  HR 97     ** Please Note: This note has been constructed using a voice recognition system   **

## 2022-01-26 NOTE — SEPSIS NOTE
Sepsis Note   Pascual Rowan 60 y o  male MRN: [id number]  Unit/Bed#: FT 03 Encounter: [id number]       qSOFA     [address] Name 01/26/22 0030 01/25/22 2222             Altered mental status GCS < 15 -- --       Respiratory Rate > / =82 0 0       Systolic BP < / =798 0 0       Q Sofa Score 0 0                  Initial Sepsis Screening     Row Name 01/26/22 0034                Is the patient's history suggestive of a new or worsening infection? Yes (Proceed)  -AO        Suspected source of infection soft tissue  -AO        Are two or more of the following signs & symptoms of infection both present and new to the patient? Yes (Proceed)  -AO        Indicate SIRS criteria Tachycardia > 90 bpm;Leukocytosis (WBC > 27165 IJL)  -AO        If the answer is yes to both questions, suspicion of sepsis is present --        If severe sepsis is present AND tissue hypoperfusion perists in the hour after fluid resuscitation or lactate > 4, the patient meets criteria for SEPTIC SHOCK --        Are any of the following organ dysfunction criteria present within 6 hours of suspected infection and SIRS criteria that are NOT considered to be chronic conditions?  No  -AO        Organ dysfunction --        Date of presentation of severe sepsis --        Time of presentation of severe sepsis --        Tissue hypoperfusion persists in the hour after crystalloid fluid administration, evidenced, by either: --        Was hypotension present within one hour of the conclusion of crystalloid fluid administration? --        Date of presentation of septic shock --        Time of presentation of septic shock --              User Key  (r) = Recorded By, (t) = Taken By, (c) = Cosigned By    [address] Name Provider Jessie Dinero MD Physician

## 2022-01-26 NOTE — ASSESSMENT & PLAN NOTE
Given vanco and unasyn in ER  Check MRSA nasal screen   Continue vanco   XR negative   CRP >90 00   Consult podiatry   Continue IV abx   Last TdAp 2018

## 2022-01-26 NOTE — ASSESSMENT & PLAN NOTE
POA, e/b tachycardia, tachpnea, leukocytosis   Suspect 2/2 foot infection  Check bl cx x2  Normal lactic acid   Bolused 1 L IVF in ER and started on 125cc/hr NSS

## 2022-01-26 NOTE — DISCHARGE SUMMARY
Discharge Summary - [address] [facility]    Patient Information: María Doss 60 y o  male MRN: [id number]  Unit/Bed#: S -01 Encounter: [id number]    Discharging Physician / Practitioner: Katharina Weber MD  PCP: Clayton Aviles MD  Admission Date: 1/25/2022  Discharge Date: 01/26/22    Disposition:     Home    Reason for Admission:  Right foot injury    Discharge Diagnoses:     Principal Problem:    Injury of right foot  Active Problems:    Essential hypertension    Uncontrolled type 2 diabetes mellitus with foot ulcer, without long-term current use of insulin ([facility])    Tobacco abuse    [facility]  Resolved Problems:    Cellulitis of right foot      Consultations During Hospital Stay:  · Podiatry    Procedures Performed:     · X-ray right foot:  Acute minimally displaced fracture of 3rd and 4th proximal phalanges noted  · Chest x-ray showed no active disease    Hospital Course:     María Doss is a 60 y o  male patient who originally presented to the hospital on 1/25/2022 due to right foot injury  Patient was seen by Podiatry noted to have fractures as noted above  Patient was given surgical boot  He was prescribed 7 day course of Keflex for cellulitis  He is discharged home in stable condition with outpatient Podiatry follow-up  Condition at Discharge: good     Discharge Day Visit / Exam:     Subjective:  Feels well and no complaints  Vitals: Blood Pressure: 154/84 (01/26/22 0823)  Pulse: 94 (01/26/22 0823)  Temperature: 98 5 °F (36 9 °C) (01/26/22 0823)  Temp Source: Oral (01/26/22 0823)  Respirations: 18 (01/26/22 0823)  Height: 6' 3" (190 5 cm) (01/26/22 0030)  Weight - Scale: 106 kg (233 lb 11 oz) (01/26/22 0030)  SpO2: 93 % (01/26/22 0823)  Exam:   Physical Exam  Gen -Patient comfortable   Neck- Supple   No thyromegaly or lymphadenopathy  Lungs-Clear bilaterally without any wheeze or rales   Heart S1-S2, regular rate and rhythm, no murmurs  Abdomen-soft nontender, no organomegaly  Bowel sounds present  Extremities-no cyanosi,  clubbing or edema  Right foot swollen and mild erythema  Skin- no rash  Neuro-nonfocal         Discharge instructions/Information to patient and family:   See after visit summary for information provided to patient and family  Provisions for Follow-Up Care:  See after visit summary for information related to follow-up care and any pertinent home health orders  Planned Readmission: no     Discharge Statement:  I spent 25 minutes discharging the patient  This time was spent on the day of discharge  I had direct contact with the patient on the day of discharge  Greater than 50% of the total time was spent examining patient, answering all patient questions, arranging and discussing plan of care with patient as well as directly providing post-discharge instructions  Additional time then spent on discharge activities  Discharge Medications:  See after visit summary for reconciled discharge medications provided to patient and family        ** Please Note: This note has been constructed using a voice recognition system **

## 2022-01-26 NOTE — ASSESSMENT & PLAN NOTE
Lab Results   Component Value Date    HGBA1C 7 8 (H) 09/28/2021     Recent Labs     01/25/22  2256   POCGLU 217*     Blood Sugar Average: Last 72 hrs:  (P) 217   Hold metformin  Continue humalog 32u BID w meals   Add SSI, diabetic diet

## 2022-01-26 NOTE — ASSESSMENT & PLAN NOTE
Pt presents with right foot pain and blister after walking into a metal pole     Given vanco and unasyn in ER though I do not appreciate cellulitis on exam    XR negative but will follow up official read   CRP >90 00   Consult podiatry   Last TdAp 2018

## 2022-01-28 LAB
ATRIAL RATE: 101 BPM
P AXIS: 57 DEGREES
PR INTERVAL: 148 MS
QRS AXIS: 61 DEGREES
QRSD INTERVAL: 96 MS
QT INTERVAL: 318 MS
QTC INTERVAL: 404 MS
T WAVE AXIS: 46 DEGREES
VENTRICULAR RATE: 97 BPM

## 2022-01-28 PROCEDURE — 93010 ELECTROCARDIOGRAM REPORT: CPT | Performed by: INTERNAL MEDICINE

## 2022-01-31 LAB
BACTERIA BLD CULT: NORMAL
BACTERIA BLD CULT: NORMAL

## 2022-03-28 ENCOUNTER — HOSPITAL ENCOUNTER (EMERGENCY)
Facility: HOSPITAL | Age: 57
Discharge: HOME | End: 2022-03-28
Attending: EMERGENCY MEDICINE
Payer: MEDICARE

## 2022-03-28 VITALS
SYSTOLIC BLOOD PRESSURE: 166 MMHG | RESPIRATION RATE: 20 BRPM | HEART RATE: 99 BPM | DIASTOLIC BLOOD PRESSURE: 97 MMHG | OXYGEN SATURATION: 95 % | TEMPERATURE: 97.8 F

## 2022-03-28 DIAGNOSIS — R10.9 ABDOMINAL PAIN: Primary | ICD-10-CM

## 2022-03-28 DIAGNOSIS — R11.2 NAUSEA AND VOMITING: ICD-10-CM

## 2022-03-28 DIAGNOSIS — E11.43 GASTROPARESIS DUE TO DM (HCC): ICD-10-CM

## 2022-03-28 DIAGNOSIS — K31.84 GASTROPARESIS DUE TO DM (HCC): ICD-10-CM

## 2022-03-28 LAB
ALBUMIN SERPL BCP-MCNC: 3.6 G/DL (ref 3.5–5)
ALP SERPL-CCNC: 89 U/L (ref 46–116)
ALT SERPL W P-5'-P-CCNC: 32 U/L (ref 12–78)
ANION GAP SERPL CALCULATED.3IONS-SCNC: 9 MMOL/L (ref 4–13)
AST SERPL W P-5'-P-CCNC: 14 U/L (ref 5–45)
BASOPHILS # BLD AUTO: 0.05 THOUSANDS/ΜL (ref 0–0.1)
BASOPHILS NFR BLD AUTO: 0 % (ref 0–1)
BILIRUB SERPL-MCNC: 2.1 MG/DL (ref 0.2–1)
BUN SERPL-MCNC: 19 MG/DL (ref 5–25)
CALCIUM SERPL-MCNC: 8.1 MG/DL (ref 8.3–10.1)
CHLORIDE SERPL-SCNC: 93 MMOL/L (ref 100–108)
CO2 SERPL-SCNC: 27 MMOL/L (ref 21–32)
CREAT SERPL-MCNC: 1.04 MG/DL (ref 0.6–1.3)
EOSINOPHIL # BLD AUTO: 0 THOUSAND/ΜL (ref 0–0.61)
EOSINOPHIL NFR BLD AUTO: 0 % (ref 0–6)
ERYTHROCYTE [DISTWIDTH] IN BLOOD BY AUTOMATED COUNT: 12.5 % (ref 11.6–15.1)
GFR SERPL CREATININE-BSD FRML MDRD: 78 ML/MIN/1.73SQ M
GLUCOSE SERPL-MCNC: 301 MG/DL (ref 65–140)
HCT VFR BLD AUTO: 49.7 % (ref 36.5–49.3)
HGB BLD-MCNC: 17.5 G/DL (ref 12–17)
IMM GRANULOCYTES # BLD AUTO: 0.12 THOUSAND/UL (ref 0–0.2)
IMM GRANULOCYTES NFR BLD AUTO: 1 % (ref 0–2)
LIPASE SERPL-CCNC: 108 U/L (ref 73–393)
LYMPHOCYTES # BLD AUTO: 2.13 THOUSANDS/ΜL (ref 0.6–4.47)
LYMPHOCYTES NFR BLD AUTO: 13 % (ref 14–44)
MCH RBC QN AUTO: 28.9 PG (ref 26.8–34.3)
MCHC RBC AUTO-ENTMCNC: 35.2 G/DL (ref 31.4–37.4)
MCV RBC AUTO: 82 FL (ref 82–98)
MONOCYTES # BLD AUTO: 0.77 THOUSAND/ΜL (ref 0.17–1.22)
MONOCYTES NFR BLD AUTO: 5 % (ref 4–12)
NEUTROPHILS # BLD AUTO: 12.87 THOUSANDS/ΜL (ref 1.85–7.62)
NEUTS SEG NFR BLD AUTO: 81 % (ref 43–75)
NRBC BLD AUTO-RTO: 0 /100 WBCS
PLATELET # BLD AUTO: 348 THOUSANDS/UL (ref 149–390)
PMV BLD AUTO: 9 FL (ref 8.9–12.7)
POTASSIUM SERPL-SCNC: 3.6 MMOL/L (ref 3.5–5.3)
PROT SERPL-MCNC: 7 G/DL (ref 6.4–8.2)
RBC # BLD AUTO: 6.05 MILLION/UL (ref 3.88–5.62)
SODIUM SERPL-SCNC: 129 MMOL/L (ref 136–145)
WBC # BLD AUTO: 15.94 THOUSAND/UL (ref 4.31–10.16)

## 2022-03-28 PROCEDURE — 36415 COLL VENOUS BLD VENIPUNCTURE: CPT

## 2022-03-28 PROCEDURE — 85025 COMPLETE CBC W/AUTO DIFF WBC: CPT

## 2022-03-28 PROCEDURE — 83690 ASSAY OF LIPASE: CPT

## 2022-03-28 PROCEDURE — 96374 THER/PROPH/DIAG INJ IV PUSH: CPT

## 2022-03-28 PROCEDURE — 80053 COMPREHEN METABOLIC PANEL: CPT

## 2022-03-28 PROCEDURE — 96361 HYDRATE IV INFUSION ADD-ON: CPT

## 2022-03-28 PROCEDURE — 99285 EMERGENCY DEPT VISIT HI MDM: CPT | Performed by: EMERGENCY MEDICINE

## 2022-03-28 PROCEDURE — 99284 EMERGENCY DEPT VISIT MOD MDM: CPT

## 2022-03-28 RX ORDER — HALOPERIDOL 5 MG/ML
2 INJECTION INTRAMUSCULAR ONCE
Status: COMPLETED | OUTPATIENT
Start: 2022-03-28 | End: 2022-03-28

## 2022-03-28 RX ADMIN — HALOPERIDOL LACTATE 2 MG: 5 INJECTION, SOLUTION INTRAMUSCULAR at 10:41

## 2022-03-28 RX ADMIN — SODIUM CHLORIDE 1000 ML: 0.9 INJECTION, SOLUTION INTRAVENOUS at 10:40

## 2022-03-28 NOTE — ED PROVIDER NOTES
History  Chief Complaint   Patient presents with    Abdominal Pain     Abdominal pain since Saturday  Pt c/o n/v  Used Malox yesterday and has not vomitted since  Pt reports continued pain  Hx of gastroparesis  78-year-old male with past medical history of hypertension and type 2 diabetes complicated by gastroparesis and neuropathy presents with 3 days of generalized abdominal pain that is worse in the upper quadrants  Patient describes constant pain that feels exactly the same as prior gastroparesis exacerbations  Patient reports he has frequent episodes of gastroparesis occurring every couple of months  Patient reports 10+ episodes of nonbloody, nonbilious vomiting over the weekend and a few episodes of nonbloody diarrhea  Patient reports taking Maalox yesterday and has not had vomiting since  Patient denies any episodes of vomiting or diarrhea today  Patient reports a decrease in appetite and has not been able to tolerate PO in 3 days  Patient denies fevers, recent illness, chest pain, shortness of breath  Patient states he was supposed of follow-up with GI for a gastric emptying study but has not been able to do so at this point  Patient offers no other complaints at this time  Abdominal Pain  Associated symptoms: diarrhea, nausea and vomiting    Associated symptoms: no chest pain, no chills, no cough, no dysuria, no fever, no hematuria, no shortness of breath and no sore throat        Prior to Admission Medications   Prescriptions Last Dose Informant Patient Reported? Taking?    ALPRAZolam (XANAX) 0 5 mg tablet   No No   Sig: Take 1 tablet by mouth 2 (two) times a day as needed for anxiety for up to 10 days   atorvastatin (LIPITOR) 20 mg tablet   Yes No   Sig: Take 20 mg by mouth daily   Patient not taking: Reported on 7/4/2021   dicyclomine (BENTYL) 20 mg tablet   No No   Sig: Take 1 tablet (20 mg total) by mouth 3 (three) times a day as needed (Abdominal cramping, diarrhea)   insulin lispro (HumaLOG KwikPen) 100 units/mL injection pen   No No   Sig: Inject 32 Units under the skin 2 (two) times a day with meals   lisinopril (ZESTRIL) 10 mg tablet   Yes No   Sig: Take 10 mg by mouth daily   metFORMIN (GLUCOPHAGE-XR) 750 mg 24 hr tablet   Yes No   Sig: Take 750 mg by mouth daily with breakfast   metoclopramide (REGLAN) 10 mg tablet   No No   Sig: Take 1 tablet (10 mg total) by mouth 3 (three) times a day as needed (nausea/ vomiting) As needed for nausea   Patient not taking: Reported on 7/4/2021   metoclopramide (Reglan) 10 mg tablet   No No   Sig: Take 1 tablet (10 mg total) by mouth 3 (three) times a day as needed (nausea and vomiting)   ondansetron (ZOFRAN-ODT) 4 mg disintegrating tablet   No No   Sig: Take 1 tablet (4 mg total) by mouth every 6 (six) hours as needed for nausea or vomiting   ondansetron (Zofran ODT) 4 mg disintegrating tablet   No No   Sig: Take 1 tablet (4 mg total) by mouth every 6 (six) hours as needed for nausea or vomiting   ondansetron (Zofran ODT) 4 mg disintegrating tablet   No No   Sig: Take 1 tablet (4 mg total) by mouth every 6 (six) hours as needed for nausea or vomiting   pantoprazole (PROTONIX) 40 mg tablet   Yes No   Sig: Take 40 mg by mouth daily   pantoprazole (PROTONIX) 40 mg tablet   No No   Sig: Take 1 tablet (40 mg total) by mouth daily      Facility-Administered Medications: None       Past Medical History:   Diagnosis Date    Diabetes mellitus ([facility] [address] )     Gastroparesis     Hypertension     Neuropathy        Past Surgical History:   Procedure Laterality Date    SC AMPUTATION FOOT,TRANSMETATARSAL Left 10/30/2017    Procedure: AMPUTATION TRANSMETATARSAL (TMA);  Surgeon: Emerald Bright DPM;  [facility];  Service: Podiatry    SC AMPUTATION FOOT,TRANSMETATARSAL Left 11/3/2017    Procedure: AMPUTATION TRANSMETATARSAL (TMA), left foot;  Surgeon: Emerald Bright DPM;  [facility];  Service: Podiatry       History reviewed   No pertinent family history  I have reviewed and agree with the history as documented  E-Cigarette/Vaping    E-Cigarette Use Never User      E-Cigarette/Vaping Substances     Social History     Tobacco Use    Smoking status: Current Some Day Smoker     Types: Cigars    Smokeless tobacco: Never Used   Vaping Use    Vaping Use: Never used   Substance Use Topics    Alcohol use: No    Drug use: Yes     Types: Marijuana        Review of Systems   Constitutional: Negative for chills and fever  HENT: Negative for ear pain and sore throat  Eyes: Negative for pain and visual disturbance  Respiratory: Negative for cough and shortness of breath  Cardiovascular: Negative for chest pain and palpitations  Gastrointestinal: Positive for abdominal pain, diarrhea, nausea and vomiting  Genitourinary: Negative for dysuria and hematuria  Musculoskeletal: Negative for arthralgias and back pain  Skin: Negative for color change and rash  Neurological: Negative for seizures and syncope  All other systems reviewed and are negative  Physical Exam  ED Triage Vitals [03/28/22 0950]   Temperature Pulse Respirations Blood Pressure SpO2   97 8 °F (36 6 °C) 99 20 166/97 95 %      Temp Source Heart Rate Source Patient Position - Orthostatic VS BP Location FiO2 (%)   Oral Monitor Sitting Right arm --      Pain Score       9             Orthostatic Vital Signs  Vitals:    03/28/22 0950   BP: 166/97   Pulse: 99   Patient Position - Orthostatic VS: Sitting       Physical Exam  Vitals and nursing note reviewed  Constitutional:       General: He is in acute distress (secondary to abdominal discomfort)  Appearance: He is well-developed  He is not ill-appearing  HENT:      Head: Normocephalic and atraumatic        Mouth/Throat:      Mouth: Mucous membranes are moist       Comments: Lips dry, moist mucus membranes   Eyes:      Conjunctiva/sclera: Conjunctivae normal    Cardiovascular:      Rate and Rhythm: Normal rate and regular rhythm  Heart sounds: No murmur heard  Pulmonary:      Effort: Pulmonary effort is normal  No respiratory distress  Breath sounds: Normal breath sounds  Abdominal:      Palpations: Abdomen is soft  Tenderness: There is generalized abdominal tenderness (worse in the upper quadrants)  There is no guarding or rebound  Musculoskeletal:      Cervical back: Neck supple  Skin:     General: Skin is warm and dry  Neurological:      Mental Status: He is alert           ED Medications  Medications   sodium chloride 0 9 % bolus 1,000 mL (0 mL Intravenous Stopped 3/28/22 1335)   haloperidol lactate (HALDOL) injection 2 mg (2 mg Intravenous Given 3/28/22 1041)       Diagnostic Studies  Results Reviewed     Procedure Component Value Units Date/Time    Comprehensive metabolic panel [[id number]]  (Abnormal) Collected: 03/28/22 1038    Lab Status: Final result Specimen: Blood from Arm, Right Updated: 03/28/22 1118     Sodium 129 mmol/L      Potassium 3 6 mmol/L      Chloride 93 mmol/L      CO2 27 mmol/L      ANION GAP 9 mmol/L      BUN 19 mg/dL      Creatinine 1 04 mg/dL      Glucose 301 mg/dL      Calcium 8 1 mg/dL      AST 14 U/L      ALT 32 U/L      Alkaline Phosphatase 89 U/L      Total Protein 7 0 g/dL      Albumin 3 6 g/dL      Total Bilirubin 2 10 mg/dL      eGFR 78 ml/min/1 73sq m     Narrative:      [place] guidelines for Chronic Kidney Disease (CKD):     Stage 1 with normal or high GFR (GFR > 90 mL/min/1 73 square meters)    Stage 2 Mild CKD (GFR = 60-89 mL/min/1 73 square meters)    Stage 3A Moderate CKD (GFR = 45-59 mL/min/1 73 square meters)    Stage 3B Moderate CKD (GFR = 30-44 mL/min/1 73 square meters)    Stage 4 Severe CKD (GFR = 15-29 mL/min/1 73 square meters)    Stage 5 End Stage CKD (GFR <15 mL/min/1 73 square meters)  Note: GFR calculation is accurate only with a steady state creatinine    Lipase [[id number]]  (Normal) Collected: 03/28/22 1038    Lab Status: Final result Specimen: Blood from Arm, Right Updated: 03/28/22 1116     Lipase 108 u/L     CBC and differential [[id number]]  (Abnormal) Collected: 03/28/22 1038    Lab Status: Final result Specimen: Blood from Arm, Right Updated: 03/28/22 1056     WBC 15 94 Thousand/uL      RBC 6 05 Million/uL      Hemoglobin 17 5 g/dL      Hematocrit 49 7 %      MCV 82 fL      MCH 28 9 pg      MCHC 35 2 g/dL      RDW 12 5 %      MPV 9 0 fL      Platelets 923 Thousands/uL      nRBC 0 /100 WBCs      Neutrophils Relative 81 %      Immat GRANS % 1 %      Lymphocytes Relative 13 %      Monocytes Relative 5 %      Eosinophils Relative 0 %      Basophils Relative 0 %      Neutrophils Absolute 12 87 Thousands/µL      Immature Grans Absolute 0 12 Thousand/uL      Lymphocytes Absolute 2 13 Thousands/µL      Monocytes Absolute 0 77 Thousand/µL      Eosinophils Absolute 0 00 Thousand/µL      Basophils Absolute 0 05 Thousands/µL                  No orders to display         Procedures  Procedures      ED Course                                       MDM  Number of Diagnoses or Management Options  Abdominal pain  Gastroparesis due to DM (HCC)  Nausea and vomiting  Diagnosis management comments: 69-year-old male with past medical history of hypertension and type 2 diabetes complicated by gastroparesis and neuropathy presents with 3 days of generalized abdominal pain that is worse in the upper quadrants  Patient describes constant pain that feels exactly the same as prior gastroparesis exacerbations associated with vomiting and diarrhea  Exam remarkable for tenderness in upper quadrants, with no evidence of rebound or guarding  Given that patient describes pain as feeling exactly similar to prior episodes that he frequently endures, with no signs of acute abdomen on exam, will defer from imaging  Labs remarkable for hyponatremia and hypochloremia likely secondary to vomiting  Patient receiving IV fluids for hydration    Labs shows leukocytosis likely reactive leukocytosis in setting of gastroparesis and dehydration  On repeat abdominal exam, abdomen is soft and non-tender  Patient able to tolerate PO during ED stay  Patient reports much improvement following Haldol  Discussed importance to follow up with GI  Patient expressed understanding  Reviewed return precautions  Amount and/or Complexity of Data Reviewed  Clinical lab tests: ordered and reviewed  Decide to obtain previous medical records or to obtain history from someone other than the patient: yes  Obtain history from someone other than the patient: yes  Review and summarize past medical records: yes    Risk of Complications, Morbidity, and/or Mortality  Presenting problems: moderate  Diagnostic procedures: low  Management options: low    Patient Progress  Patient progress: stable      Disposition  Final diagnoses:   Abdominal pain   Nausea and vomiting   Gastroparesis due to [facility])     Time reflects when diagnosis was documented in both MDM as applicable and the Disposition within this note     Time User Action Codes Description Comment    3/28/2022 12:56 PM Lorita Dubs Add [R10 9] Abdominal pain     3/28/2022 12:57 PM Lorita Dubs Add [R11 2] Nausea and vomiting     3/28/2022 12:57 PM Lorita Dubs Add [E11 43,  K31 84] Gastroparesis due to DM [facility])       ED Disposition     ED Disposition Condition Date/Time Comment    Discharge Stable Mon Mar 28, 2022 12:56 PM [address] [facility] discharge to home/self care              Follow-up Information     Follow up With Specialties Details Why Contact Info Additional Glenroy Nielsen [facility] Schedule an appointment as soon as possible for a visit  As we discussed, please follow up with your GI doctor for evaluation of gastroparesis [address] [facility] [address] [facility] [place], [address] [place] [address] [facility] [address], [place] [facility] [place], [address]    [address] Emergency Department Emergency Medicine  If symptoms worsen including worsening abdominal pain, persistent vomiting or diarrhea, unable to tolerate fluids, fevers, or any other concerning symptoms [address] [facility] [address] [facility] Emergency Department, [address], [place], [place], [address] MD Internal Medicine Schedule an appointment as soon as possible for a visit   [address] [facility] 350 [facility]  [phone]             Discharge Medication List as of 3/28/2022 12:58 PM      CONTINUE these medications which have NOT CHANGED    Details   ALPRAZolam (XANAX) 0 5 mg tablet Take 1 tablet by mouth 2 (two) times a day as needed for anxiety for up to 10 days, Starting Tue 11/7/2017, Until Sun 7/4/2021 at 2359, Print      atorvastatin (LIPITOR) 20 mg tablet Take 20 mg by mouth daily, Historical Med      dicyclomine (BENTYL) 20 mg tablet Take 1 tablet (20 mg total) by mouth 3 (three) times a day as needed (Abdominal cramping, diarrhea), Starting Tue 9/28/2021, Normal      insulin lispro (HumaLOG KwikPen) 100 units/mL injection pen Inject 32 Units under the skin 2 (two) times a day with meals, Starting Mon 7/5/2021, No Print      lisinopril (ZESTRIL) 10 mg tablet Take 10 mg by mouth daily, Historical Med      metFORMIN (GLUCOPHAGE-XR) 750 mg 24 hr tablet Take 750 mg by mouth daily with breakfast, Historical Med      !! metoclopramide (REGLAN) 10 mg tablet Take 1 tablet (10 mg total) by mouth 3 (three) times a day as needed (nausea/ vomiting) As needed for nausea, Starting Thu 3/14/2019, Print      !! metoclopramide (Reglan) 10 mg tablet Take 1 tablet (10 mg total) by mouth 3 (three) times a day as needed (nausea and vomiting), Starting Wed 12/29/2021, Normal      !! ondansetron (Zofran ODT) 4 mg disintegrating tablet Take 1 tablet (4 mg total) by mouth every 6 (six) hours as needed for nausea or vomiting, Starting Tue 9/28/2021, Normal      !! ondansetron (Zofran ODT) 4 mg disintegrating tablet Take 1 tablet (4 mg total) by mouth every 6 (six) hours as needed for nausea or vomiting, Starting Wed 12/29/2021, Normal      !! ondansetron (ZOFRAN-ODT) 4 mg disintegrating tablet Take 1 tablet (4 mg total) by mouth every 6 (six) hours as needed for nausea or vomiting, Starting Mon 7/5/2021, Normal      !! pantoprazole (PROTONIX) 40 mg tablet Take 40 mg by mouth daily, Historical Med      !! pantoprazole (PROTONIX) 40 mg tablet Take 1 tablet (40 mg total) by mouth daily, Starting Wed 12/29/2021, Normal       !! - Potential duplicate medications found  Please discuss with provider  No discharge procedures on file  PDMP Review       Value Time User    PDMP Reviewed  Yes 1/25/2022 10:27 PM Arnaldo Abarca MD           ED Provider  Attending physically available and evaluated Roselia Adams  ALBERT managed the patient along with the ED Attending      Electronically Signed by         Erlinda Dawson MD  03/28/22 9204

## 2022-03-28 NOTE — ED ATTENDING ATTESTATION
3/28/2022  IKandis MD, saw and evaluated the patient  I have discussed the patient with the resident/non-physician practitioner and agree with the resident's/non-physician practitioner's findings, Plan of Care, and MDM as documented in the resident's/non-physician practitioner's note, except where noted  All available labs and Radiology studies were reviewed  I was present for key portions of any procedure(s) performed by the resident/non-physician practitioner and I was immediately available to provide assistance  At this point I agree with the current assessment done in the Emergency Department  I have conducted an independent evaluation of this patient a history and physical is as follows: Abdominal pain consistent with previous episodes of gastroparesis  Resolved with Haldol  IV fluids given  Patient with no abdominal pain following Haldol treatment  He has no focal tenderness  He does have mild leukocytosis which is likely reactive in setting of vomiting  He has no right lower quadrant pain, no rebound, no guarding  He has tolerate oral intake, drank full cup of water in emergency department following treatment  He has outpatient follow-up with a gastroenterologist   Strongly recommended continued follow-up even though he feels better at this time as he has significant history of gastroparesis and relapsing symptoms  Review of Systems - Negative except nausea and vomiting  Physical Exam  Vitals and nursing note reviewed  Constitutional:       Appearance: He is well-developed  HENT:      Head: Normocephalic and atraumatic  Eyes:      Conjunctiva/sclera: Conjunctivae normal    Cardiovascular:      Rate and Rhythm: Normal rate and regular rhythm  Heart sounds: No murmur heard  Pulmonary:      Effort: Pulmonary effort is normal  No respiratory distress  Breath sounds: Normal breath sounds  Abdominal:      Palpations: Abdomen is soft  Tenderness:  There is no abdominal tenderness  Musculoskeletal:      Cervical back: Neck supple  Skin:     General: Skin is warm and dry  Neurological:      Mental Status: He is alert         Results Reviewed     Procedure Component Value Units Date/Time    Comprehensive metabolic panel [[id number]]  (Abnormal) Collected: 03/28/22 1038    Lab Status: Final result Specimen: Blood from Arm, Right Updated: 03/28/22 1118     Sodium 129 mmol/L      Potassium 3 6 mmol/L      Chloride 93 mmol/L      CO2 27 mmol/L      ANION GAP 9 mmol/L      BUN 19 mg/dL      Creatinine 1 04 mg/dL      Glucose 301 mg/dL      Calcium 8 1 mg/dL      AST 14 U/L      ALT 32 U/L      Alkaline Phosphatase 89 U/L      Total Protein 7 0 g/dL      Albumin 3 6 g/dL      Total Bilirubin 2 10 mg/dL      eGFR 78 ml/min/1 73sq m     Narrative:      [place] guidelines for Chronic Kidney Disease (CKD):     Stage 1 with normal or high GFR (GFR > 90 mL/min/1 73 square meters)    Stage 2 Mild CKD (GFR = 60-89 mL/min/1 73 square meters)    Stage 3A Moderate CKD (GFR = 45-59 mL/min/1 73 square meters)    Stage 3B Moderate CKD (GFR = 30-44 mL/min/1 73 square meters)    Stage 4 Severe CKD (GFR = 15-29 mL/min/1 73 square meters)    Stage 5 End Stage CKD (GFR <15 mL/min/1 73 square meters)  Note: GFR calculation is accurate only with a steady state creatinine    Lipase [[id number]]  (Normal) Collected: 03/28/22 1038    Lab Status: Final result Specimen: Blood from Arm, Right Updated: 03/28/22 1116     Lipase 108 u/L     CBC and differential [[id number]]  (Abnormal) Collected: 03/28/22 1038    Lab Status: Final result Specimen: Blood from Arm, Right Updated: 03/28/22 1056     WBC 15 94 Thousand/uL      RBC 6 05 Million/uL      Hemoglobin 17 5 g/dL      Hematocrit 49 7 %      MCV 82 fL      MCH 28 9 pg      MCHC 35 2 g/dL      RDW 12 5 %      MPV 9 0 fL      Platelets 088 Thousands/uL      nRBC 0 /100 WBCs      Neutrophils Relative 81 %      Immat GRANS % 1 %      Lymphocytes Relative 13 %      Monocytes Relative 5 %      Eosinophils Relative 0 %      Basophils Relative 0 %      Neutrophils Absolute 12 87 Thousands/µL      Immature Grans Absolute 0 12 Thousand/uL      Lymphocytes Absolute 2 13 Thousands/µL      Monocytes Absolute 0 77 Thousand/µL      Eosinophils Absolute 0 00 Thousand/µL      Basophils Absolute 0 05 Thousands/µL             ED Course         Critical Care Time  Procedures

## 2022-07-02 ENCOUNTER — APPOINTMENT (EMERGENCY)
Dept: CT IMAGING | Facility: HOSPITAL | Age: 57
DRG: 074 | End: 2022-07-02
Payer: MEDICARE

## 2022-07-02 ENCOUNTER — HOSPITAL ENCOUNTER (INPATIENT)
Facility: HOSPITAL | Age: 57
LOS: 1 days | Discharge: HOME | DRG: 074 | End: 2022-07-03
Attending: EMERGENCY MEDICINE | Admitting: INTERNAL MEDICINE
Payer: MEDICARE

## 2022-07-02 DIAGNOSIS — E11.9 TYPE 2 DIABETES MELLITUS, WITH LONG-TERM CURRENT USE OF INSULIN (HCC): ICD-10-CM

## 2022-07-02 DIAGNOSIS — R11.2 INTRACTABLE NAUSEA AND VOMITING: Primary | ICD-10-CM

## 2022-07-02 DIAGNOSIS — R17 ELEVATED BILIRUBIN: ICD-10-CM

## 2022-07-02 DIAGNOSIS — K52.9 ACUTE GASTROENTERITIS: ICD-10-CM

## 2022-07-02 DIAGNOSIS — Z79.4 TYPE 2 DIABETES MELLITUS, WITH LONG-TERM CURRENT USE OF INSULIN (HCC): ICD-10-CM

## 2022-07-02 PROBLEM — K31.84 GASTROPARESIS DUE TO DM (HCC): Status: ACTIVE | Noted: 2022-07-02

## 2022-07-02 PROBLEM — E11.43 GASTROPARESIS DUE TO DM (HCC): Status: ACTIVE | Noted: 2022-07-02

## 2022-07-02 LAB
2HR DELTA HS TROPONIN: -1 NG/L
4HR DELTA HS TROPONIN: 0 NG/L
ALBUMIN SERPL BCP-MCNC: 4.5 G/DL (ref 3.5–5)
ALP SERPL-CCNC: 69 U/L (ref 34–104)
ALT SERPL W P-5'-P-CCNC: 20 U/L (ref 7–52)
ANION GAP SERPL CALCULATED.3IONS-SCNC: 13 MMOL/L (ref 4–13)
APTT PPP: 24 SECONDS (ref 23–37)
AST SERPL W P-5'-P-CCNC: 13 U/L (ref 13–39)
BASE EX.OXY STD BLDV CALC-SCNC: 86.3 % (ref 60–80)
BASE EXCESS BLDV CALC-SCNC: 2.1 MMOL/L
BASOPHILS # BLD AUTO: 0.07 THOUSANDS/ΜL (ref 0–0.1)
BASOPHILS NFR BLD AUTO: 1 % (ref 0–1)
BETA-HYDROXYBUTYRATE: 1.1 MMOL/L
BILIRUB SERPL-MCNC: 3.33 MG/DL (ref 0.2–1)
BUN SERPL-MCNC: 25 MG/DL (ref 5–25)
CALCIUM SERPL-MCNC: 9.1 MG/DL (ref 8.4–10.2)
CARDIAC TROPONIN I PNL SERPL HS: 3 NG/L
CARDIAC TROPONIN I PNL SERPL HS: 4 NG/L
CARDIAC TROPONIN I PNL SERPL HS: 4 NG/L
CHLORIDE SERPL-SCNC: 95 MMOL/L (ref 96–108)
CO2 SERPL-SCNC: 25 MMOL/L (ref 21–32)
CREAT SERPL-MCNC: 0.98 MG/DL (ref 0.6–1.3)
EOSINOPHIL # BLD AUTO: 0.02 THOUSAND/ΜL (ref 0–0.61)
EOSINOPHIL NFR BLD AUTO: 0 % (ref 0–6)
ERYTHROCYTE [DISTWIDTH] IN BLOOD BY AUTOMATED COUNT: 12.7 % (ref 11.6–15.1)
GFR SERPL CREATININE-BSD FRML MDRD: 84 ML/MIN/1.73SQ M
GLUCOSE SERPL-MCNC: 198 MG/DL (ref 65–140)
GLUCOSE SERPL-MCNC: 210 MG/DL (ref 65–140)
GLUCOSE SERPL-MCNC: 221 MG/DL (ref 65–140)
GLUCOSE SERPL-MCNC: 243 MG/DL (ref 65–140)
GLUCOSE SERPL-MCNC: 277 MG/DL (ref 65–140)
GLUCOSE SERPL-MCNC: 289 MG/DL (ref 65–140)
HCO3 BLDV-SCNC: 23.8 MMOL/L (ref 24–30)
HCT VFR BLD AUTO: 50.8 % (ref 36.5–49.3)
HGB BLD-MCNC: 18.5 G/DL (ref 12–17)
IMM GRANULOCYTES # BLD AUTO: 0.07 THOUSAND/UL (ref 0–0.2)
IMM GRANULOCYTES NFR BLD AUTO: 1 % (ref 0–2)
INR PPP: 0.89 (ref 0.84–1.19)
LIPASE SERPL-CCNC: 31 U/L (ref 11–82)
LYMPHOCYTES # BLD AUTO: 2.3 THOUSANDS/ΜL (ref 0.6–4.47)
LYMPHOCYTES NFR BLD AUTO: 18 % (ref 14–44)
MCH RBC QN AUTO: 30 PG (ref 26.8–34.3)
MCHC RBC AUTO-ENTMCNC: 36.4 G/DL (ref 31.4–37.4)
MCV RBC AUTO: 82 FL (ref 82–98)
MONOCYTES # BLD AUTO: 0.66 THOUSAND/ΜL (ref 0.17–1.22)
MONOCYTES NFR BLD AUTO: 5 % (ref 4–12)
NEUTROPHILS # BLD AUTO: 9.62 THOUSANDS/ΜL (ref 1.85–7.62)
NEUTS SEG NFR BLD AUTO: 75 % (ref 43–75)
NRBC BLD AUTO-RTO: 0 /100 WBCS
O2 CT BLDV-SCNC: 22.8 ML/DL
PCO2 BLDV: 30.5 MM HG (ref 42–50)
PH BLDV: 7.51 [PH] (ref 7.3–7.4)
PLATELET # BLD AUTO: 330 THOUSANDS/UL (ref 149–390)
PMV BLD AUTO: 8.8 FL (ref 8.9–12.7)
PO2 BLDV: 47.4 MM HG (ref 35–45)
POTASSIUM SERPL-SCNC: 3.6 MMOL/L (ref 3.5–5.3)
PROT SERPL-MCNC: 7 G/DL (ref 6.4–8.4)
PROTHROMBIN TIME: 12 SECONDS (ref 11.6–14.5)
RBC # BLD AUTO: 6.17 MILLION/UL (ref 3.88–5.62)
SODIUM SERPL-SCNC: 133 MMOL/L (ref 135–147)
WBC # BLD AUTO: 12.74 THOUSAND/UL (ref 4.31–10.16)

## 2022-07-02 PROCEDURE — 99285 EMERGENCY DEPT VISIT HI MDM: CPT | Performed by: PHYSICIAN ASSISTANT

## 2022-07-02 PROCEDURE — 82010 KETONE BODYS QUAN: CPT | Performed by: PHYSICIAN ASSISTANT

## 2022-07-02 PROCEDURE — 82948 REAGENT STRIP/BLOOD GLUCOSE: CPT

## 2022-07-02 PROCEDURE — 83690 ASSAY OF LIPASE: CPT | Performed by: PHYSICIAN ASSISTANT

## 2022-07-02 PROCEDURE — 85610 PROTHROMBIN TIME: CPT | Performed by: PHYSICIAN ASSISTANT

## 2022-07-02 PROCEDURE — 80053 COMPREHEN METABOLIC PANEL: CPT | Performed by: PHYSICIAN ASSISTANT

## 2022-07-02 PROCEDURE — 36415 COLL VENOUS BLD VENIPUNCTURE: CPT | Performed by: PHYSICIAN ASSISTANT

## 2022-07-02 PROCEDURE — 99285 EMERGENCY DEPT VISIT HI MDM: CPT

## 2022-07-02 PROCEDURE — 96374 THER/PROPH/DIAG INJ IV PUSH: CPT

## 2022-07-02 PROCEDURE — 85730 THROMBOPLASTIN TIME PARTIAL: CPT | Performed by: PHYSICIAN ASSISTANT

## 2022-07-02 PROCEDURE — 96375 TX/PRO/DX INJ NEW DRUG ADDON: CPT

## 2022-07-02 PROCEDURE — G1004 CDSM NDSC: HCPCS

## 2022-07-02 PROCEDURE — 82805 BLOOD GASES W/O2 SATURATION: CPT | Performed by: PHYSICIAN ASSISTANT

## 2022-07-02 PROCEDURE — 93005 ELECTROCARDIOGRAM TRACING: CPT

## 2022-07-02 PROCEDURE — 99223 1ST HOSP IP/OBS HIGH 75: CPT | Performed by: INTERNAL MEDICINE

## 2022-07-02 PROCEDURE — 84484 ASSAY OF TROPONIN QUANT: CPT | Performed by: PHYSICIAN ASSISTANT

## 2022-07-02 PROCEDURE — 96361 HYDRATE IV INFUSION ADD-ON: CPT

## 2022-07-02 PROCEDURE — 74176 CT ABD & PELVIS W/O CONTRAST: CPT

## 2022-07-02 PROCEDURE — 85025 COMPLETE CBC W/AUTO DIFF WBC: CPT | Performed by: PHYSICIAN ASSISTANT

## 2022-07-02 PROCEDURE — C9113 INJ PANTOPRAZOLE SODIUM, VIA: HCPCS | Performed by: INTERNAL MEDICINE

## 2022-07-02 RX ORDER — ENOXAPARIN SODIUM 100 MG/ML
40 INJECTION SUBCUTANEOUS DAILY
Status: DISCONTINUED | OUTPATIENT
Start: 2022-07-02 | End: 2022-07-03 | Stop reason: HOSPADM

## 2022-07-02 RX ORDER — ALPRAZOLAM 0.5 MG/1
0.5 TABLET ORAL 2 TIMES DAILY PRN
Status: DISCONTINUED | OUTPATIENT
Start: 2022-07-02 | End: 2022-07-03 | Stop reason: HOSPADM

## 2022-07-02 RX ORDER — INSULIN LISPRO 100 [IU]/ML
1-6 INJECTION, SOLUTION INTRAVENOUS; SUBCUTANEOUS
Status: DISCONTINUED | OUTPATIENT
Start: 2022-07-02 | End: 2022-07-03 | Stop reason: HOSPADM

## 2022-07-02 RX ORDER — SODIUM CHLORIDE, SODIUM GLUCONATE, SODIUM ACETATE, POTASSIUM CHLORIDE, MAGNESIUM CHLORIDE, SODIUM PHOSPHATE, DIBASIC, AND POTASSIUM PHOSPHATE .53; .5; .37; .037; .03; .012; .00082 G/100ML; G/100ML; G/100ML; G/100ML; G/100ML; G/100ML; G/100ML
75 INJECTION, SOLUTION INTRAVENOUS CONTINUOUS
Status: DISCONTINUED | OUTPATIENT
Start: 2022-07-02 | End: 2022-07-03

## 2022-07-02 RX ORDER — GABAPENTIN 300 MG/1
300 CAPSULE ORAL 2 TIMES DAILY
Status: DISCONTINUED | OUTPATIENT
Start: 2022-07-02 | End: 2022-07-03 | Stop reason: HOSPADM

## 2022-07-02 RX ORDER — PROMETHAZINE HYDROCHLORIDE 25 MG/ML
25 INJECTION, SOLUTION INTRAMUSCULAR; INTRAVENOUS EVERY 6 HOURS PRN
Status: DISCONTINUED | OUTPATIENT
Start: 2022-07-02 | End: 2022-07-03 | Stop reason: HOSPADM

## 2022-07-02 RX ORDER — ONDANSETRON 2 MG/ML
4 INJECTION INTRAMUSCULAR; INTRAVENOUS ONCE
Status: COMPLETED | OUTPATIENT
Start: 2022-07-02 | End: 2022-07-02

## 2022-07-02 RX ORDER — DOCUSATE SODIUM 100 MG/1
100 CAPSULE, LIQUID FILLED ORAL 2 TIMES DAILY
Status: DISCONTINUED | OUTPATIENT
Start: 2022-07-02 | End: 2022-07-03 | Stop reason: HOSPADM

## 2022-07-02 RX ORDER — NICOTINE 21 MG/24HR
1 PATCH, TRANSDERMAL 24 HOURS TRANSDERMAL DAILY
Status: DISCONTINUED | OUTPATIENT
Start: 2022-07-02 | End: 2022-07-03 | Stop reason: HOSPADM

## 2022-07-02 RX ORDER — METOCLOPRAMIDE HYDROCHLORIDE 5 MG/ML
10 INJECTION INTRAMUSCULAR; INTRAVENOUS EVERY 6 HOURS SCHEDULED
Status: DISCONTINUED | OUTPATIENT
Start: 2022-07-02 | End: 2022-07-03 | Stop reason: HOSPADM

## 2022-07-02 RX ORDER — POTASSIUM CHLORIDE 20 MEQ/1
20 TABLET, EXTENDED RELEASE ORAL 2 TIMES DAILY
Status: DISCONTINUED | OUTPATIENT
Start: 2022-07-02 | End: 2022-07-03 | Stop reason: HOSPADM

## 2022-07-02 RX ORDER — CALCIUM CARBONATE 200(500)MG
1000 TABLET,CHEWABLE ORAL DAILY PRN
Status: DISCONTINUED | OUTPATIENT
Start: 2022-07-02 | End: 2022-07-03 | Stop reason: HOSPADM

## 2022-07-02 RX ORDER — GABAPENTIN 300 MG/1
300 CAPSULE ORAL 2 TIMES DAILY
COMMUNITY

## 2022-07-02 RX ORDER — DICYCLOMINE HYDROCHLORIDE 10 MG/1
10 CAPSULE ORAL 4 TIMES DAILY PRN
Status: DISCONTINUED | OUTPATIENT
Start: 2022-07-02 | End: 2022-07-03 | Stop reason: HOSPADM

## 2022-07-02 RX ORDER — ACETAMINOPHEN 325 MG/1
650 TABLET ORAL EVERY 6 HOURS PRN
Status: DISCONTINUED | OUTPATIENT
Start: 2022-07-02 | End: 2022-07-03 | Stop reason: HOSPADM

## 2022-07-02 RX ORDER — SENNOSIDES 8.6 MG
2 TABLET ORAL DAILY
Status: DISCONTINUED | OUTPATIENT
Start: 2022-07-02 | End: 2022-07-03 | Stop reason: HOSPADM

## 2022-07-02 RX ORDER — DIPHENHYDRAMINE HYDROCHLORIDE 50 MG/ML
25 INJECTION INTRAMUSCULAR; INTRAVENOUS ONCE
Status: COMPLETED | OUTPATIENT
Start: 2022-07-02 | End: 2022-07-02

## 2022-07-02 RX ORDER — PANTOPRAZOLE SODIUM 40 MG/10ML
40 INJECTION, POWDER, LYOPHILIZED, FOR SOLUTION INTRAVENOUS
Status: DISCONTINUED | OUTPATIENT
Start: 2022-07-02 | End: 2022-07-03 | Stop reason: HOSPADM

## 2022-07-02 RX ORDER — ONDANSETRON 2 MG/ML
4 INJECTION INTRAMUSCULAR; INTRAVENOUS EVERY 6 HOURS PRN
Status: DISCONTINUED | OUTPATIENT
Start: 2022-07-02 | End: 2022-07-03 | Stop reason: HOSPADM

## 2022-07-02 RX ORDER — METOCLOPRAMIDE HYDROCHLORIDE 5 MG/ML
10 INJECTION INTRAMUSCULAR; INTRAVENOUS ONCE
Status: COMPLETED | OUTPATIENT
Start: 2022-07-02 | End: 2022-07-02

## 2022-07-02 RX ADMIN — DOCUSATE SODIUM 100 MG: 100 CAPSULE, LIQUID FILLED ORAL at 14:13

## 2022-07-02 RX ADMIN — PROMETHAZINE HYDROCHLORIDE 25 MG: 25 INJECTION INTRAMUSCULAR; INTRAVENOUS at 18:36

## 2022-07-02 RX ADMIN — DIPHENHYDRAMINE HYDROCHLORIDE 25 MG: 50 INJECTION, SOLUTION INTRAMUSCULAR; INTRAVENOUS at 10:35

## 2022-07-02 RX ADMIN — INSULIN LISPRO 3 UNITS: 100 INJECTION, SOLUTION INTRAVENOUS; SUBCUTANEOUS at 14:14

## 2022-07-02 RX ADMIN — ENOXAPARIN SODIUM 40 MG: 40 INJECTION SUBCUTANEOUS at 14:14

## 2022-07-02 RX ADMIN — ONDANSETRON 4 MG: 2 INJECTION INTRAMUSCULAR; INTRAVENOUS at 09:11

## 2022-07-02 RX ADMIN — SODIUM CHLORIDE, SODIUM GLUCONATE, SODIUM ACETATE, POTASSIUM CHLORIDE, MAGNESIUM CHLORIDE, SODIUM PHOSPHATE, DIBASIC, AND POTASSIUM PHOSPHATE 75 ML/HR: .53; .5; .37; .037; .03; .012; .00082 INJECTION, SOLUTION INTRAVENOUS at 14:31

## 2022-07-02 RX ADMIN — SODIUM CHLORIDE 1000 ML: 0.9 INJECTION, SOLUTION INTRAVENOUS at 09:11

## 2022-07-02 RX ADMIN — DOCUSATE SODIUM 100 MG: 100 CAPSULE, LIQUID FILLED ORAL at 18:36

## 2022-07-02 RX ADMIN — METOCLOPRAMIDE HYDROCHLORIDE 10 MG: 5 INJECTION INTRAMUSCULAR; INTRAVENOUS at 18:40

## 2022-07-02 RX ADMIN — POTASSIUM CHLORIDE 20 MEQ: 1500 TABLET, EXTENDED RELEASE ORAL at 14:13

## 2022-07-02 RX ADMIN — METOCLOPRAMIDE HYDROCHLORIDE 10 MG: 5 INJECTION INTRAMUSCULAR; INTRAVENOUS at 14:14

## 2022-07-02 RX ADMIN — METOCLOPRAMIDE HYDROCHLORIDE 10 MG: 5 INJECTION INTRAMUSCULAR; INTRAVENOUS at 22:51

## 2022-07-02 RX ADMIN — INSULIN LISPRO 2 UNITS: 100 INJECTION, SOLUTION INTRAVENOUS; SUBCUTANEOUS at 22:51

## 2022-07-02 RX ADMIN — POTASSIUM CHLORIDE 20 MEQ: 1500 TABLET, EXTENDED RELEASE ORAL at 18:36

## 2022-07-02 RX ADMIN — METOCLOPRAMIDE HYDROCHLORIDE 10 MG: 5 INJECTION INTRAMUSCULAR; INTRAVENOUS at 10:35

## 2022-07-02 RX ADMIN — PANTOPRAZOLE SODIUM 40 MG: 40 INJECTION, POWDER, FOR SOLUTION INTRAVENOUS at 14:19

## 2022-07-02 RX ADMIN — INSULIN LISPRO 2 UNITS: 100 INJECTION, SOLUTION INTRAVENOUS; SUBCUTANEOUS at 16:20

## 2022-07-02 RX ADMIN — GABAPENTIN 300 MG: 300 CAPSULE ORAL at 18:36

## 2022-07-02 RX ADMIN — GABAPENTIN 300 MG: 300 CAPSULE ORAL at 14:14

## 2022-07-02 RX ADMIN — SENNOSIDES 17.2 MG: 8.6 TABLET, FILM COATED ORAL at 14:18

## 2022-07-02 NOTE — ED PROVIDER NOTES
History  Chief Complaint   Patient presents with    Abdominal Pain     Per pt "he had a sudden onset abdominal pain since last Saturday and begun vomiting on Tuesday denies any diarrhea "     57-year-old male presents emergency department with complaints of abdominal pain  States it is starting to have some epigastric abdominal pain approximately 1 week ago  Has additionally had some nausea and vomiting over the past week  States that he had slightly resolution of symptoms yesterday with taking 1 Zofran that he had left at home  States he was able to tolerate fluids yesterday but no solid foods  Has not been able to take any of his blood pressure medications over the past several days  History of similar  History provided by:  Patient   used: No    Abdominal Pain  Pain location:  Epigastric  Pain quality: aching and cramping    Pain radiates to:  Does not radiate  Pain severity:  Moderate  Onset quality:  Gradual  Duration:  1 week  Timing:  Intermittent  Progression:  Waxing and waning  Chronicity:  Recurrent  Relieved by:  Nothing  Ineffective treatments: zofran  Associated symptoms: anorexia, chills, nausea and vomiting    Associated symptoms: no belching, no chest pain, no constipation, no cough, no diarrhea, no dysuria, no fatigue, no fever, no flatus, no hematemesis, no hematochezia, no hematuria, no melena, no shortness of breath and no sore throat        Prior to Admission Medications   Prescriptions Last Dose Informant Patient Reported? Taking?    ALPRAZolam (XANAX) 0 5 mg tablet   No Yes   Sig: Take 1 tablet by mouth 2 (two) times a day as needed for anxiety for up to 10 days   Pantoprazole Sodium (PROTONIX PO)   Yes Yes   Sig: Take 20 mg by mouth   gabapentin (NEURONTIN) 300 mg capsule   Yes Yes   Sig: Take 300 mg by mouth 2 (two) times a day   insulin lispro (HumaLOG) 100 units/mL injection   Yes Yes   Sig: Inject under the skin   lisinopril (ZESTRIL) 10 mg tablet   Yes Yes   Sig: Take 10 mg by mouth daily   ondansetron (Zofran ODT) 4 mg disintegrating tablet   No Yes   Sig: Take 1 tablet (4 mg total) by mouth every 6 (six) hours as needed for nausea or vomiting      Facility-Administered Medications: None       Past Medical History:   Diagnosis Date    Diabetes mellitus ([address] )     Gastroparesis     Hypertension     Neuropathy        Past Surgical History:   Procedure Laterality Date    [place] AMPUTATION FOOT,TRANSMETATARSAL Left 10/30/2017    Procedure: AMPUTATION TRANSMETATARSAL (TMA);  Surgeon: Criss Conti DPM;  [facility];  Service: Podiatry    [address] [place] [address] Left 11/3/2017    Procedure: AMPUTATION TRANSMETATARSAL (TMA), left foot;  Surgeon: Criss Conti DPM;  [facility];  Service: Podiatry       History reviewed  No pertinent family history  I have reviewed and agree with the history as documented  E-Cigarette/Vaping    E-Cigarette Use Never User      E-Cigarette/Vaping Substances     Social History     Tobacco Use    Smoking status: Current Some Day Smoker     Types: Cigars    Smokeless tobacco: Never Used   Vaping Use    Vaping Use: Never used   Substance Use Topics    Alcohol use: No    Drug use: Yes     Types: Marijuana       Review of Systems   Constitutional: Positive for chills  Negative for activity change, appetite change, fatigue and fever  HENT: Negative for congestion, dental problem, drooling, ear discharge, ear pain, mouth sores, nosebleeds, rhinorrhea, sore throat and trouble swallowing  Eyes: Negative for pain, discharge and itching  Respiratory: Negative for cough, chest tightness, shortness of breath and wheezing  Cardiovascular: Negative for chest pain and palpitations  Gastrointestinal: Positive for abdominal pain, anorexia, nausea and vomiting  Negative for blood in stool, constipation, diarrhea, flatus, hematemesis, hematochezia and melena     Endocrine: Negative for cold intolerance and heat intolerance  Genitourinary: Negative for difficulty urinating, dysuria, flank pain, frequency, hematuria and urgency  Allergic/Immunologic: Negative for food allergies and immunocompromised state  Neurological: Negative for dizziness, seizures, syncope, weakness, numbness and headaches  Psychiatric/Behavioral: Negative for agitation, behavioral problems and confusion  Physical Exam  Physical Exam  Vitals and nursing note reviewed  Constitutional:       General: He is not in acute distress  Appearance: He is ill-appearing  He is not diaphoretic  HENT:      Head: Normocephalic and atraumatic  Right Ear: External ear normal       Left Ear: External ear normal       Mouth/Throat:      Pharynx: No oropharyngeal exudate  Eyes:      Conjunctiva/sclera: Conjunctivae normal    Neck:      Vascular: No JVD  Trachea: No tracheal deviation  Cardiovascular:      Rate and Rhythm: Normal rate and regular rhythm  Heart sounds: Normal heart sounds  No murmur heard  No friction rub  No gallop  Pulmonary:      Effort: Pulmonary effort is normal  No respiratory distress  Breath sounds: Normal breath sounds  No wheezing or rales  Chest:      Chest wall: No tenderness  Abdominal:      General: Bowel sounds are normal  There is no distension  Palpations: Abdomen is soft  Tenderness: There is abdominal tenderness in the epigastric area  There is no guarding  Musculoskeletal:         General: No tenderness or deformity  Normal range of motion  Lymphadenopathy:      Cervical: No cervical adenopathy  Skin:     General: Skin is warm and dry  Findings: No erythema or rash  Neurological:      General: No focal deficit present  Mental Status: He is alert and oriented to person, place, and time     Psychiatric:         Mood and Affect: Mood normal          Behavior: Behavior normal          Vital Signs  ED Triage Vitals   Temperature Pulse Respirations Blood Pressure SpO2   07/02/22 0915 07/02/22 0809 07/02/22 0809 07/02/22 0809 07/02/22 0809   97 7 °F (36 5 °C) 81 20 (!) 205/106 99 %      Temp Source Heart Rate Source Patient Position - Orthostatic VS BP Location FiO2 (%)   07/02/22 0915 07/02/22 0809 07/02/22 0809 07/02/22 0809 --   Axillary Monitor Lying Left arm       Pain Score       07/02/22 0809       10 - Worst Possible Pain           Vitals:    07/02/22 1115 07/02/22 1130 07/02/22 1145 07/02/22 1200   BP:       Pulse: 90 86 88 82   Patient Position - Orthostatic VS:             Visual Acuity      ED Medications  Medications   sodium chloride 0 9 % bolus 1,000 mL (0 mL Intravenous Stopped 7/2/22 1011)   ondansetron (ZOFRAN) injection 4 mg (4 mg Intravenous Given 7/2/22 0911)   metoclopramide (REGLAN) injection 10 mg (10 mg Intravenous Given 7/2/22 1035)   diphenhydrAMINE (BENADRYL) injection 25 mg (25 mg Intravenous Given 7/2/22 1035)       Diagnostic Studies  Results Reviewed     Procedure Component Value Units Date/Time    HS Troponin I 4hr [[id number]] Collected: 07/02/22 1302    Lab Status: No result Specimen: Blood from Arm, Right     HS Troponin I 2hr [[id number]]  (Normal) Collected: 07/02/22 1031    Lab Status: Final result Specimen: Blood from Arm, Right Updated: 07/02/22 1123     hs TnI 2hr 3 ng/L      Delta 2hr hsTnI -1 ng/L     HS Troponin 0hr (reflex protocol) [[id number]]  (Normal) Collected: 07/02/22 0832    Lab Status: Final result Specimen: Blood from Arm, Right Updated: 07/02/22 0932     hs TnI 0hr 4 ng/L     Comprehensive metabolic panel [[id number]]  (Abnormal) Collected: 07/02/22 0832    Lab Status: Final result Specimen: Blood from Arm, Right Updated: 07/02/22 0915     Sodium 133 mmol/L      Potassium 3 6 mmol/L      Chloride 95 mmol/L      CO2 25 mmol/L      ANION GAP 13 mmol/L      BUN 25 mg/dL      Creatinine 0 98 mg/dL      Glucose 277 mg/dL      Calcium 9 1 mg/dL      AST 13 U/L      ALT 20 U/L      Alkaline Phosphatase 69 U/L      Total Protein 7 0 g/dL      Albumin 4 5 g/dL      Total Bilirubin 3 33 mg/dL      eGFR 84 ml/min/1 73sq m     Narrative:      National Kidney Disease Foundation guidelines for Chronic Kidney Disease (CKD):     Stage 1 with normal or high GFR (GFR > 90 mL/min/1 73 square meters)    Stage 2 Mild CKD (GFR = 60-89 mL/min/1 73 square meters)    Stage 3A Moderate CKD (GFR = 45-59 mL/min/1 73 square meters)    Stage 3B Moderate CKD (GFR = 30-44 mL/min/1 73 square meters)    Stage 4 Severe CKD (GFR = 15-29 mL/min/1 73 square meters)    Stage 5 End Stage CKD (GFR <15 mL/min/1 73 square meters)  Note: GFR calculation is accurate only with a steady state creatinine    Lipase [[id number]]  (Normal) Collected: 07/02/22 0832    Lab Status: Final result Specimen: Blood from Arm, Right Updated: 07/02/22 0915     Lipase 31 u/L     Protime-INR [[id number]]  (Normal) Collected: 07/02/22 0832    Lab Status: Final result Specimen: Blood from Arm, Right Updated: 07/02/22 0907     Protime 12 0 seconds      INR 0 89    APTT [[id number]]  (Normal) Collected: 07/02/22 0832    Lab Status: Final result Specimen: Blood from Arm, Right Updated: 07/02/22 0907     PTT 24 seconds     Beta Hydroxybutyrate [[id number]]  (Abnormal) Collected: 07/02/22 0832    Lab Status: Final result Specimen: Blood from Arm, Right Updated: 07/02/22 0859     BETA-HYDROXYBUTYRATE 1 1 mmol/L     CBC and differential [[id number]]  (Abnormal) Collected: 07/02/22 0832    Lab Status: Final result Specimen: Blood from Arm, Right Updated: 07/02/22 0858     WBC 12 74 Thousand/uL      RBC 6 17 Million/uL      Hemoglobin 18 5 g/dL      Hematocrit 50 8 %      MCV 82 fL      MCH 30 0 pg      MCHC 36 4 g/dL      RDW 12 7 %      MPV 8 8 fL      Platelets 489 Thousands/uL      nRBC 0 /100 WBCs      Neutrophils Relative 75 %      Immat GRANS % 1 %      Lymphocytes Relative 18 %      Monocytes Relative 5 %      Eosinophils Relative 0 %      Basophils Relative 1 % Neutrophils Absolute 9 62 Thousands/µL      Immature Grans Absolute 0 07 Thousand/uL      Lymphocytes Absolute 2 30 Thousands/µL      Monocytes Absolute 0 66 Thousand/µL      Eosinophils Absolute 0 02 Thousand/µL      Basophils Absolute 0 07 Thousands/µL     Blood gas, venous [[id number]]  (Abnormal) Collected: 07/02/22 0832    Lab Status: Final result Specimen: Blood from Arm, Right Updated: 07/02/22 0855     pH, Eddy 7 510     pCO2, Eddy 30 5 mm Hg      pO2, Eddy 47 4 mm Hg      HCO3, Eddy 23 8 mmol/L      Base Excess, Eddy 2 1 mmol/L      O2 Content, Eddy 22 8 ml/dL      O2 HGB, VENOUS 86 3 %     Fingerstick Glucose (POCT) [[id number]]  (Abnormal) Collected: 07/02/22 0828    Lab Status: Final result Updated: 07/02/22 0830     POC Glucose 289 mg/dl                  CT abdomen pelvis wo contrast   Final Result by Thompson Albarran MD (07/02 1134)         1  No acute abnormality identified in the abdomen or pelvis  2   Previously suspected cecal polyp not appreciated on this unenhanced exam   However, colonoscopy suggested (if not already performed) as previously recommended on reports from prior CT examinations dated 12/29/2021 and 9/28/2021    3   Diverticulosis without evidence for acute diverticulitis  4   Additional findings as noted  The study was marked in [facility] for immediate notification              Workstation performed: [id number]                    Procedures  ECG 12 Lead Documentation Only    Date/Time: 7/2/2022 8:46 AM  Performed by: Chip Cast PA-C  Authorized by: Chip Cast PA-C     Indications / Diagnosis:  Nausea/vomting  ECG reviewed by me, the ED Provider: yes    Patient location:  ED  Previous ECG:     Previous ECG:  Compared to current    Comparison ECG info:  1/25/22  Interpretation:     Interpretation: normal    Rate:     ECG rate:  74    ECG rate assessment: normal    Rhythm:     Rhythm: sinus rhythm    Ectopy:     Ectopy: none    QRS:     QRS axis:  Normal    QRS intervals:  Normal  Conduction:     Conduction: normal    ST segments:     ST segments:  Normal  T waves:     T waves: normal               ED Course  ED Course as of 07/02/22 1303   Sat Jul 02, 2022   1035 Patient still with nausea and recurrent vomiting at this time  Will proceeds with CT and additional anti-emetics  Likely admit for persistent symptoms  MDM  Number of Diagnoses or Management Options  Diagnosis management comments: Differential diagnosis includes but not limited to:  Diabetic gastroparesis, dehydration, electrolyte abnormality, DKA, pancreatitis         Amount and/or Complexity of Data Reviewed  Clinical lab tests: ordered        Disposition  Final diagnoses:   Intractable nausea and vomiting     Time reflects when diagnosis was documented in both MDM as applicable and the Disposition within this note     Time User Action Codes Description Comment    7/2/2022 12:33 PM Freddie Luis 24 [R11 2] Intractable nausea and vomiting       ED Disposition     ED Disposition   Admit    Condition   Stable    Date/Time   Sat Jul 2, 2022 12:33 PM    Comment   Case was discussed with AL and the patient's admission status was agreed to be Admission Status: inpatient status to the service of Dr Frias Comp   Follow-up Information    None         Patient's Medications   Discharge Prescriptions    No medications on file       No discharge procedures on file      PDMP Review       Value Time User    PDMP Reviewed  Yes 1/25/2022 10:27 PM Elbert Martin MD          ED Provider  Electronically Signed by           John Malhotra PA-C  07/02/22 1307

## 2022-07-02 NOTE — ASSESSMENT & PLAN NOTE
Lab Results   Component Value Date    HGBA1C 8 9 (H) 01/26/2022       Recent Labs     07/02/22  0828   POCGLU 289*       Blood Sugar Average: Last 72 hrs:  (P) 289   · Hold prandial insulin due to vomiting, only sliding scale for now  · Diabetic diet when able  · Pt is not on basal insulin at home, consider adding this upon discharge

## 2022-07-02 NOTE — ASSESSMENT & PLAN NOTE
· Isolated elevated bilirub, possible lab error vs due to vomiting    · Recheck LFTs in AM, if persistently elevated check RUQ US

## 2022-07-02 NOTE — H&P
[facility]  H&P- Henry Mcbride 1965, 59 y o  male MRN: [id number]  Unit/Bed#: FT 02 Encounter: [id number]  Primary Care Provider: Rossana Lewis MD   Date and time admitted to hospital: 7/2/2022  8:02 AM    * Gastroparesis due to DM [facility])  Assessment & Plan  · Not improved with ED treatment  · Will admit for further management  · Clear liquid diet, advance as tolerated  · IV hydration  · IV reglan q6h  · PRN antiemetics  · IV protonix  · Supportive care, supplement electrolytes etc    Elevated bilirubin  Assessment & Plan  · Isolated elevated bilirub, possible lab error vs due to vomiting  · Recheck LFTs in AM, if persistently elevated check RUQ US    Type 2 diabetes mellitus, with long-term current use of insulin ([facility])  Assessment & Plan  Lab Results   Component Value Date    HGBA1C 8 9 (H) 01/26/2022       Recent Labs     07/02/22  0828   POCGLU 289*       Blood Sugar Average: Last 72 hrs:  (P) 289   · Hold prandial insulin due to vomiting, only sliding scale for now  · Diabetic diet when able  · Pt is not on basal insulin at home, consider adding this upon discharge    Essential hypertension  Assessment & Plan  · Hold lisinopril due to vomiting and volume depletion    Tobacco abuse  Assessment & Plan  · Nicotine patch ordered      VTE Pharmacologic Prophylaxis:   Moderate Risk (Score 3-4) - Pharmacological DVT Prophylaxis Ordered: enoxaparin (Lovenox)  Code Status: Level 1 - Full Code   Discussion with family: Patient declined call to   Anticipated Length of Stay: Patient will be admitted on an inpatient basis with an anticipated length of stay of greater than 2 midnights secondary to gastroparesis  Total Time for Visit, including Counseling / Coordination of Care: 45 minutes Greater than 50% of this total time spent on direct patient counseling and coordination of care  Chief Complaint: vomiting    History of Present Illness:  Henry Mcbride is a 59 y o  male with a PMH of diabetic gastroparesis who presents with vomiting  He attending a  last week, allyn jang and noticed his symptoms start at that time  He reports nausea and vomiting but no fever or diarrhea  No one else that attended is sick per the patient  Due to the persistence of his symptoms he presented to the ED  He was hydrated and administered antiemetics without relief, he was referred to internal medicine for further treatment  He reports epigastric and LLE pain  No diarrhea  Last BM was multiple days ago  CT abdomen was essentially unremarkable for acute findings  Review of Systems:  Review of Systems   All other systems reviewed and are negative  Past Medical and Surgical History:   Past Medical History:   Diagnosis Date    Diabetes mellitus ([facility] [address] )     Gastroparesis     Hypertension     Neuropathy        Past Surgical History:   Procedure Laterality Date    [place] AMPUTATION FOOT,TRANSMETATARSAL Left 10/30/2017    Procedure: AMPUTATION TRANSMETATARSAL (TMA);  Surgeon: Criss Conti DPM;  [facility];  Service: Podiatry    [address] [place] [address] Left 11/3/2017    Procedure: AMPUTATION TRANSMETATARSAL (TMA), left foot;  Surgeon: Criss Conti DPM;  [facility];  Service: Podiatry       Meds/Allergies:  Prior to Admission medications    Medication Sig Start Date End Date Taking?  Authorizing Provider   ALPRAZolam Barbararigo Donald) 0 5 mg tablet Take 1 tablet by mouth 2 (two) times a day as needed for anxiety for up to 10 days 17 Yes Karissa Santos MD   gabapentin (NEURONTIN) 300 mg capsule Take 300 mg by mouth 2 (two) times a day   Yes Historical Provider, MD   insulin lispro (HumaLOG) 100 units/mL injection Inject under the skin   Yes Historical Provider, MD   lisinopril (ZESTRIL) 10 mg tablet Take 10 mg by mouth daily   Yes Historical Provider, MD   ondansetron (Zofran ODT) 4 mg disintegrating tablet Take 1 tablet (4 mg total) by mouth every 6 (six) hours as needed for nausea or vomiting 9/28/21  Yes Magda Uriarte DO   Pantoprazole Sodium (PROTONIX PO) Take 20 mg by mouth   Yes Historical Provider, MD   atorvastatin (LIPITOR) 20 mg tablet Take 20 mg by mouth daily  Patient not taking: Reported on 7/4/2021 7/2/22  Historical Provider, MD   dicyclomine (BENTYL) 20 mg tablet Take 1 tablet (20 mg total) by mouth 3 (three) times a day as needed (Abdominal cramping, diarrhea) 9/28/21 7/2/22  Magda Uriarte DO   insulin lispro (HumaLOG KwikPen) 100 units/mL injection pen Inject 32 Units under the skin 2 (two) times a day with meals 7/5/21 7/2/22  Bird Nguyen DO   metFORMIN (GLUCOPHAGE-XR) 750 mg 24 hr tablet Take 750 mg by mouth daily with breakfast  7/2/22  Historical Provider, MD   metoclopramide (REGLAN) 10 mg tablet Take 1 tablet (10 mg total) by mouth 3 (three) times a day as needed (nausea/ vomiting) As needed for nausea  Patient not taking: Reported on 7/4/2021 3/14/19 7/2/22  Pari Bird MD   metoclopramide (Reglan) 10 mg tablet Take 1 tablet (10 mg total) by mouth 3 (three) times a day as needed (nausea and vomiting) 12/29/21 7/2/22  Niki Peabody, PA-C   ondansetron (Zofran ODT) 4 mg disintegrating tablet Take 1 tablet (4 mg total) by mouth every 6 (six) hours as needed for nausea or vomiting 12/29/21 7/2/22  Niki Peabody, PA-C   ondansetron (ZOFRAN-ODT) 4 mg disintegrating tablet Take 1 tablet (4 mg total) by mouth every 6 (six) hours as needed for nausea or vomiting 7/5/21 7/2/22  Bird Nguyen DO   pantoprazole (PROTONIX) 40 mg tablet Take 40 mg by mouth daily  7/2/22  Historical Provider, MD   pantoprazole (PROTONIX) 40 mg tablet Take 1 tablet (40 mg total) by mouth daily 12/29/21 7/2/22  Niki Peabody, PA-C     I have reviewed home medications with patient personally      Allergies: No Known Allergies    Social History:  Marital Status: /Civil Union   Occupation: none listed  Patient Pre-hospital Living Situation: Home  Patient Pre-hospital Level of Mobility: walks  Patient Pre-hospital Diet Restrictions: diabetic  Substance Use History:   Social History     Substance and Sexual Activity   Alcohol Use No     Social History     Tobacco Use   Smoking Status Current Some Day Smoker    Types: Cigars   Smokeless Tobacco Never Used     Social History     Substance and Sexual Activity   Drug Use Yes    Types: Marijuana       Family History:  History reviewed  No pertinent family history  Physical Exam:     Vitals:   Blood Pressure: 158/93 (07/02/22 1100)  Pulse: 82 (07/02/22 1200)  Temperature: 97 7 °F (36 5 °C) (07/02/22 0915)  Temp Source: Axillary (07/02/22 0915)  Respirations: 20 (07/02/22 0809)  Weight - Scale: 110 kg (243 lb) (07/02/22 0809)  SpO2: 94 % (07/02/22 1200)    Physical Exam  Constitutional:       Appearance: Normal appearance  He is ill-appearing  HENT:      Head: Normocephalic and atraumatic  Nose: Nose normal       Mouth/Throat:      Mouth: Mucous membranes are moist       Pharynx: Oropharynx is clear  Eyes:      Extraocular Movements: Extraocular movements intact  Cardiovascular:      Rate and Rhythm: Normal rate and regular rhythm  Pulmonary:      Effort: Pulmonary effort is normal       Breath sounds: No wheezing or rales  Abdominal:      General: There is no distension  Palpations: Abdomen is soft  There is no mass  Tenderness: There is abdominal tenderness  There is no guarding or rebound  Hernia: No hernia is present  Musculoskeletal:         General: Normal range of motion  Right lower leg: No edema  Left lower leg: No edema  Skin:     General: Skin is warm and dry  Neurological:      General: No focal deficit present  Mental Status: He is alert and oriented to person, place, and time     Psychiatric:         Mood and Affect: Mood normal          Behavior: Behavior normal           Additional Data:     Lab Results:  Results from last 7 days Lab Units 07/02/22  0832   WBC Thousand/uL 12 74*   HEMOGLOBIN g/dL 18 5*   HEMATOCRIT % 50 8*   PLATELETS Thousands/uL 330   NEUTROS PCT % 75   LYMPHS PCT % 18   MONOS PCT % 5   EOS PCT % 0     Results from last 7 days   Lab Units 07/02/22  0832   SODIUM mmol/L 133*   POTASSIUM mmol/L 3 6   CHLORIDE mmol/L 95*   CO2 mmol/L 25   BUN mg/dL 25   CREATININE mg/dL 0 98   ANION GAP mmol/L 13   CALCIUM mg/dL 9 1   ALBUMIN g/dL 4 5   TOTAL BILIRUBIN mg/dL 3 33*   ALK PHOS U/L 69   ALT U/L 20   AST U/L 13   GLUCOSE RANDOM mg/dL 277*     Results from last 7 days   Lab Units 07/02/22  0832   INR  0 89     Results from last 7 days   Lab Units 07/02/22  0828   POC GLUCOSE mg/dl 289*               Imaging: No pertinent imaging reviewed  CT abdomen pelvis wo contrast   Final Result by Dona Richard MD (07/02 1134)         1  No acute abnormality identified in the abdomen or pelvis  2   Previously suspected cecal polyp not appreciated on this unenhanced exam   However, colonoscopy suggested (if not already performed) as previously recommended on reports from prior CT examinations dated 12/29/2021 and 9/28/2021    3   Diverticulosis without evidence for acute diverticulitis  4   Additional findings as noted  The study was marked in [facility] for immediate notification  Workstation performed: [id number]             EKG and Other Studies Reviewed on Admission:   · EKG: No EKG obtained  ** Please Note: This note has been constructed using a voice recognition system   **

## 2022-07-02 NOTE — ASSESSMENT & PLAN NOTE
· Not improved with ED treatment  · Will admit for further management  · Clear liquid diet, advance as tolerated  · IV hydration  · IV reglan q6h  · PRN antiemetics  · IV protonix  · Supportive care, supplement electrolytes etc

## 2022-07-03 VITALS
SYSTOLIC BLOOD PRESSURE: 144 MMHG | BODY MASS INDEX: 30.37 KG/M2 | DIASTOLIC BLOOD PRESSURE: 91 MMHG | RESPIRATION RATE: 18 BRPM | WEIGHT: 243 LBS | TEMPERATURE: 98.2 F | HEART RATE: 89 BPM | OXYGEN SATURATION: 94 %

## 2022-07-03 LAB
ALBUMIN SERPL BCP-MCNC: 3.9 G/DL (ref 3.5–5)
ALP SERPL-CCNC: 62 U/L (ref 34–104)
ALT SERPL W P-5'-P-CCNC: 17 U/L (ref 7–52)
ANION GAP SERPL CALCULATED.3IONS-SCNC: 9 MMOL/L (ref 4–13)
AST SERPL W P-5'-P-CCNC: 11 U/L (ref 13–39)
ATRIAL RATE: 78 BPM
BILIRUB DIRECT SERPL-MCNC: 0.25 MG/DL (ref 0–0.2)
BILIRUB SERPL-MCNC: 3.04 MG/DL (ref 0.2–1)
BUN SERPL-MCNC: 17 MG/DL (ref 5–25)
CALCIUM SERPL-MCNC: 8.2 MG/DL (ref 8.4–10.2)
CHLORIDE SERPL-SCNC: 100 MMOL/L (ref 96–108)
CO2 SERPL-SCNC: 26 MMOL/L (ref 21–32)
CREAT SERPL-MCNC: 0.93 MG/DL (ref 0.6–1.3)
GFR SERPL CREATININE-BSD FRML MDRD: 89 ML/MIN/1.73SQ M
GLUCOSE SERPL-MCNC: 187 MG/DL (ref 65–140)
GLUCOSE SERPL-MCNC: 202 MG/DL (ref 65–140)
GLUCOSE SERPL-MCNC: 208 MG/DL (ref 65–140)
GLUCOSE SERPL-MCNC: 255 MG/DL (ref 65–140)
MAGNESIUM SERPL-MCNC: 2.1 MG/DL (ref 1.9–2.7)
P AXIS: 34 DEGREES
PHOSPHATE SERPL-MCNC: 2.4 MG/DL (ref 2.7–4.5)
POTASSIUM SERPL-SCNC: 3.3 MMOL/L (ref 3.5–5.3)
PR INTERVAL: 158 MS
PROT SERPL-MCNC: 6.2 G/DL (ref 6.4–8.4)
QRS AXIS: 41 DEGREES
QRSD INTERVAL: 108 MS
QT INTERVAL: 378 MS
QTC INTERVAL: 423 MS
SODIUM SERPL-SCNC: 135 MMOL/L (ref 135–147)
T WAVE AXIS: 55 DEGREES
VENTRICULAR RATE: 75 BPM

## 2022-07-03 PROCEDURE — 84100 ASSAY OF PHOSPHORUS: CPT | Performed by: INTERNAL MEDICINE

## 2022-07-03 PROCEDURE — C9113 INJ PANTOPRAZOLE SODIUM, VIA: HCPCS | Performed by: INTERNAL MEDICINE

## 2022-07-03 PROCEDURE — 80048 BASIC METABOLIC PNL TOTAL CA: CPT | Performed by: INTERNAL MEDICINE

## 2022-07-03 PROCEDURE — 82948 REAGENT STRIP/BLOOD GLUCOSE: CPT

## 2022-07-03 PROCEDURE — 80076 HEPATIC FUNCTION PANEL: CPT | Performed by: INTERNAL MEDICINE

## 2022-07-03 PROCEDURE — 99239 HOSP IP/OBS DSCHRG MGMT >30: CPT | Performed by: INTERNAL MEDICINE

## 2022-07-03 PROCEDURE — 83735 ASSAY OF MAGNESIUM: CPT | Performed by: INTERNAL MEDICINE

## 2022-07-03 PROCEDURE — 93010 ELECTROCARDIOGRAM REPORT: CPT | Performed by: INTERNAL MEDICINE

## 2022-07-03 RX ORDER — ONDANSETRON HYDROCHLORIDE 4 MG/1
4 TABLET, ORALLY DISINTEGRATING ORAL EVERY 6 HOURS PRN
Qty: 90 TABLET | Refills: 0 | Status: SHIPPED | OUTPATIENT
Start: 2022-07-03 | End: 2022-08-02

## 2022-07-03 RX ORDER — POTASSIUM CHLORIDE 20 MEQ/1
40 TABLET, EXTENDED RELEASE ORAL ONCE
Status: COMPLETED | OUTPATIENT
Start: 2022-07-03 | End: 2022-07-03

## 2022-07-03 RX ORDER — BLOOD SUGAR DIAGNOSTIC
STRIP MISCELLANEOUS
Qty: 100 EACH | Refills: 0 | Status: SHIPPED | OUTPATIENT
Start: 2022-07-03

## 2022-07-03 RX ORDER — INSULIN GLARGINE 100 [IU]/ML
20 INJECTION, SOLUTION SUBCUTANEOUS DAILY
Qty: 10 ML | Refills: 0 | Status: SHIPPED | OUTPATIENT
Start: 2022-07-03

## 2022-07-03 RX ORDER — GLUCOSAMINE HCL/CHONDROITIN SU 500-400 MG
CAPSULE ORAL
Qty: 100 EACH | Refills: 0 | Status: SHIPPED | OUTPATIENT
Start: 2022-07-03

## 2022-07-03 RX ORDER — LISINOPRIL 10 MG/1
10 TABLET ORAL DAILY
Status: DISCONTINUED | OUTPATIENT
Start: 2022-07-03 | End: 2022-07-03 | Stop reason: HOSPADM

## 2022-07-03 RX ORDER — PEN NEEDLE, DIABETIC 32GX 5/32"
NEEDLE, DISPOSABLE MISCELLANEOUS
Qty: 100 EACH | Refills: 0 | Status: SHIPPED | OUTPATIENT
Start: 2022-07-03

## 2022-07-03 RX ORDER — LANCETS 33 GAUGE
EACH MISCELLANEOUS
Qty: 100 EACH | Refills: 0 | Status: SHIPPED | OUTPATIENT
Start: 2022-07-03

## 2022-07-03 RX ADMIN — INSULIN LISPRO 2 UNITS: 100 INJECTION, SOLUTION INTRAVENOUS; SUBCUTANEOUS at 11:46

## 2022-07-03 RX ADMIN — LISINOPRIL 10 MG: 10 TABLET ORAL at 10:36

## 2022-07-03 RX ADMIN — ENOXAPARIN SODIUM 40 MG: 40 INJECTION SUBCUTANEOUS at 08:22

## 2022-07-03 RX ADMIN — ONDANSETRON 4 MG: 2 INJECTION INTRAMUSCULAR; INTRAVENOUS at 15:38

## 2022-07-03 RX ADMIN — METOCLOPRAMIDE HYDROCHLORIDE 10 MG: 5 INJECTION INTRAMUSCULAR; INTRAVENOUS at 05:45

## 2022-07-03 RX ADMIN — INSULIN LISPRO 3 UNITS: 100 INJECTION, SOLUTION INTRAVENOUS; SUBCUTANEOUS at 08:22

## 2022-07-03 RX ADMIN — POTASSIUM CHLORIDE 40 MEQ: 1500 TABLET, EXTENDED RELEASE ORAL at 11:46

## 2022-07-03 RX ADMIN — POTASSIUM CHLORIDE 20 MEQ: 1500 TABLET, EXTENDED RELEASE ORAL at 08:22

## 2022-07-03 RX ADMIN — PROMETHAZINE HYDROCHLORIDE 25 MG: 25 INJECTION INTRAMUSCULAR; INTRAVENOUS at 15:38

## 2022-07-03 RX ADMIN — PANTOPRAZOLE SODIUM 40 MG: 40 INJECTION, POWDER, FOR SOLUTION INTRAVENOUS at 08:12

## 2022-07-03 RX ADMIN — METOCLOPRAMIDE HYDROCHLORIDE 10 MG: 5 INJECTION INTRAMUSCULAR; INTRAVENOUS at 11:46

## 2022-07-03 RX ADMIN — GABAPENTIN 300 MG: 300 CAPSULE ORAL at 08:22

## 2022-07-03 RX ADMIN — ONDANSETRON 4 MG: 2 INJECTION INTRAMUSCULAR; INTRAVENOUS at 08:12

## 2022-07-03 NOTE — ASSESSMENT & PLAN NOTE
· Isolated elevated bilirub, no jaundice, no abdominal pain  · Repeat CMP in 1 week  · Consider right upper quadrant ultrasound if he continues to be elevated

## 2022-07-03 NOTE — ASSESSMENT & PLAN NOTE
Lab Results   Component Value Date    HGBA1C 8 9 (H) 01/26/2022       Recent Labs     07/02/22  1716 07/02/22  2122 07/03/22  0751 07/03/22  1117   POCGLU 210* 221* 255* 202*       Blood Sugar Average: Last 72 hrs:  (P) 146 [id number]

## 2022-07-03 NOTE — ASSESSMENT & PLAN NOTE
· Not improved with ED treatment  · admited for further management  · Clear liquid diet, advance full diabetic diet which he tolerated on discharge  · Status post IV hydration  · IV reglan q6h scheduled improved symptoms  · IV protonix  Switch to p o  Pantoprazole Home med on discharge  · Symptoms resolved within 24 hours with IV Reglan scheduled  · Continue home meds on discharge  Given refill of Zofran which helps is gastroparesis symptoms significantly  · Patient plans to follow-up with Gastroenterology for further evaluation

## 2022-07-03 NOTE — DISCHARGE SUMMARY
[facility]  Discharge- Camden Draper 1965, 59 y o  male MRN: [id number]  Unit/Bed#: S -01 Encounter: [id number]  Primary Care Provider: Vinicio Felipe MD   Date and time admitted to hospital: 7/2/2022  8:02 AM    * Gastroparesis due to DM [facility])  Assessment & Plan  · Not improved with ED treatment  · admited for further management  · Clear liquid diet, advance full diabetic diet which he tolerated on discharge  · Status post IV hydration  · IV reglan q6h scheduled improved symptoms  · IV protonix  Switch to p o  Pantoprazole Home med on discharge  · Symptoms resolved within 24 hours with IV Reglan scheduled  · Continue home meds on discharge  Given refill of Zofran which helps is gastroparesis symptoms significantly  · Patient plans to follow-up with Gastroenterology for further evaluation  Elevated bilirubin  Assessment & Plan  · Isolated elevated bilirub, no jaundice, no abdominal pain  · Repeat CMP in 1 week  · Consider right upper quadrant ultrasound if he continues to be elevated    Type 2 diabetes mellitus, with long-term current use of insulin [facility])  Assessment & Plan  Lab Results   Component Value Date    HGBA1C 8 9 (H) 01/26/2022       Recent Labs     07/02/22  1716 07/02/22  2122 07/03/22  0751 07/03/22  1117   POCGLU 210* 221* 255* 202*       Blood Sugar Average: Last 72 hrs:  (P) 231 [id number]   · Tolerating diabetic diet  · Patient is on Lantus 20 units qd  At home which will be resumed  · Will add Humalog 3 units t i d  With meals on discharge  · Have encourage patient to check sugars 3 times daily and follow-up with PCP  Tobacco abuse  Assessment & Plan  · Nicotine patch ordered  · Continue to encourage smoking cessation    Essential hypertension  Assessment & Plan  · Resume lisinopril due to elevated blood pressure and resumed p o  Intake        Medical Problems             Resolved Problems  Date Reviewed: 7/3/2022   None Discharging Resident: Micheal Castañeda MD  Discharging Attending: Jaiden Polk MD  PCP: Benjamin Maxwell MD  Admission Date:   Admission Orders (From admission, onward)     Ordered        22 1233  [address] [facility],[address] [place]  Once                      Discharge Date: 22    Consultations During Hospital Stay:  · None    Procedures Performed:   · None    Significant Findings / Test Results:   · CT AP unremarkable    Incidental Findings:   · None     Test Results Pending at Discharge (will require follow up): · None     Outpatient Tests Requested:  · CMP in 1 week    Complications:  None    Reason for Admission:  Vomiting    Hospital Course:   Riley Weber is a 59 y o  male patient who originally presented to the hospital on 2022 due to vomiting  Patient stated that he had a  last week any had some valve revealed only after which his symptoms started  However no one was sick at the  but his symptoms persisted  He was without Zofran which helped significantly with his symptoms  Complained of abdominal pain  CT abdomen unremarkable  Likely related to gastroparesis secondary to diabetes  With controlled sugars on insulin and IV Reglan scheduled symptoms improved  Advanced to full diabetic diet before discharge which he tolerated well  For better diabetic control  Continue Lantus 20 units daily  Add Humalog 3 units t i d  With meals  Check sugars 3 times daily to check for hyper/hypoglycemia, follow-up with PCP for further adjustment    He was highly encouraged to follow-up with Gastroenterology for further evaluation to prevent recurrent hospitalizations  The patient, initially admitted to the hospital as inpatient, was discharged earlier than expected given the following: Rapid improvement of abdominal pain and nausea vomiting       Please see above list of diagnoses and related plan for additional information       Condition at Discharge: stable    Discharge Day Visit / Exam:   Subjective:  Patient states that he feels much better after the IV Reglan  He wished to get Zofran if he is to be discharged today  He tolerated his diabetic diet well which was advanced from full liquids  Vitals: Blood Pressure: (!) 179/104 (07/03/22 0751)  Pulse: 88 (07/03/22 0751)  Temperature: 98 2 °F (36 8 °C) (07/03/22 0751)  Temp Source: Axillary (07/02/22 0915)  Respirations: 18 (07/03/22 0751)  Weight - Scale: 110 kg (243 lb) (07/02/22 0809)  SpO2: 94 % (07/03/22 0751)  Exam:   Physical Exam  Vitals and nursing note reviewed  Constitutional:       General: He is not in acute distress  Appearance: He is well-developed  He is not ill-appearing, toxic-appearing or diaphoretic  Comments: Patient looked comfortable this morning but looked significantly improved this afternoon  No abdominal pain on exam     Patient able to sit up without assistance  HENT:      Head: Normocephalic and atraumatic  Eyes:      Conjunctiva/sclera: Conjunctivae normal    Cardiovascular:      Rate and Rhythm: Normal rate and regular rhythm  Heart sounds: No murmur heard  Pulmonary:      Effort: Pulmonary effort is normal  No respiratory distress  Breath sounds: Normal breath sounds  No wheezing or rales  Abdominal:      General: There is no distension  Palpations: Abdomen is soft  Tenderness: There is no abdominal tenderness  There is no guarding  Musculoskeletal:      Cervical back: Neck supple  Right lower leg: No edema  Left lower leg: No edema  Skin:     General: Skin is warm and dry  Neurological:      Mental Status: He is alert  Psychiatric:         Mood and Affect: Mood normal          Behavior: Behavior normal           Discussion with Family: Attempted to update  (wife) via phone  Unable to contact  Discharge instructions/Information to patient and family:   See after visit summary for information provided to patient and family  Provisions for Follow-Up Care:  See after visit summary for information related to follow-up care and any pertinent home health orders  Disposition:   Home    Planned Readmission:  None    Discharge Medications:  See after visit summary for reconciled discharge medications provided to patient and/or family        **Please Note: This note may have been constructed using a voice recognition system**

## 2022-07-03 NOTE — ASSESSMENT & PLAN NOTE
Lab Results   Component Value Date    HGBA1C 8 9 (H) 01/26/2022       Recent Labs     07/02/22  1716 07/02/22  2122 07/03/22  0751 07/03/22  1117   POCGLU 210* 221* 255* 202*       Blood Sugar Average: Last 72 hrs:  (P) 231 [id number]   · Tolerating diabetic diet  · Patient is on Lantus 20 units qd  At home which will be resumed  · Will add Humalog 3 units t i d  With meals on discharge  · Have encourage patient to check sugars 3 times daily and follow-up with PCP

## 2022-07-03 NOTE — DISCHARGE INSTR - AVS FIRST PAGE
Dear Naz Horn,     It was our pleasure to care for you here at [address]  It is our hope that we were always able to exceed the expected standards for your care during your stay  You were hospitalized due to Diabetic Gatroparesis  You were cared for on the 4th floor by Aretha Steel MD under the service of Guy Murdock MD with [facility] who covers for your primary care physician (PCP), Lakisha Gan MD, while you were hospitalized  If you have any questions or concerns related to this hospitalization, you may contact us at [phone]  For follow up as well as any medication refills, we recommend that you follow up with your primary care physician  A registered nurse will reach out to you by phone within a few days after your discharge to answer any additional questions that you may have after going home  However, at this time we provide for you here, the most important instructions / recommendations at discharge:     Notable Medication Adjustments -   Zofran 4 mg as needed refilled   Lantus 20 units continued   Added Humalog 3 units three times daily   Testing Required after Discharge -   Follow up with gastroenterology to complete evaluation for gastroparesis   CMP in 1 week  Important follow up information -   Follow up with Gastroenterology   Follow up with PCP in 1 week  Other Instructions -   Diabetes supplies refilled  Please review this entire after visit summary as additional general instructions including medication list, appointments, activity, diet, any pertinent wound care, and other additional recommendations from your care team that may be provided for you        Sincerely,     Aretha Steel MD

## 2022-07-08 NOTE — PHYSICIAN ADVISOR
Current patient class: Inpatient  The patient is currently on Hospital Day: 2 at [address]      The patient was admitted to the hospital  on 7/2/22 at 12:33 PM for the following diagnosis:  Abdominal pain [R10 9]  Intractable nausea and vomiting [R11 2]     After review of the relevant documentation, labs, vital signs and test results, this is a PROVIDER LIABLE case  In this particular case the patient was admitted to the hospital as an inpatient  The patient however failed to satisfy the 2 midnight benchmark and closer scrutiny of the case was warranted  After review of the patient presentation and relevant labs the patient was most appropriate for observation or outpatient class at the time of admission  Given that this patient has already been discharged prior to this review they become a provider liable case  Rationale is as follows: The patient is a 59 yrs   Male who presented to the ED at 7/2/2022  8:02 AM with a chief complaint of Abdominal Pain (Per pt "he had a sudden onset abdominal pain since last Saturday and begun vomiting on Tuesday denies any diarrhea ") Patient with h/o liver transplant admitted with vomiting likely due to gastroenteritis  He was seen by GI and symptoms resolved with supportive care  He was stable for discharge hospital day 2  I recommend part b correction      The patients vitals on arrival were   ED Triage Vitals   Temperature Pulse Respirations Blood Pressure SpO2   07/02/22 0915 07/02/22 0809 07/02/22 0809 07/02/22 0809 07/02/22 0809   97 7 °F (36 5 °C) 81 20 (!) 205/106 99 %      Temp Source Heart Rate Source Patient Position - Orthostatic VS BP Location FiO2 (%)   07/02/22 0915 07/02/22 0809 07/02/22 0809 07/02/22 0809 --   Axillary Monitor Lying Left arm       Pain Score       07/02/22 0809       10 - Worst Possible Pain           Past Medical History:   Diagnosis Date    Diabetes mellitus ([address] )     Gastroparesis     Hypertension  Neuropathy      Past Surgical History:   Procedure Laterality Date    [place] AMPUTATION FOOT,TRANSMETATARSAL Left 10/30/2017    Procedure: AMPUTATION TRANSMETATARSAL (TMA);  Surgeon: Blade Cordero DPM;  Location: [facility];  Service: [address] Left 11/3/2017    Procedure: AMPUTATION TRANSMETATARSAL (TMA), left foot;  Surgeon: Blade Cordero DPM;  [facility];  Service: Lang Hermila have been placed to:   None    Vitals:    07/02/22 1810 07/02/22 2122 07/03/22 0751 07/03/22 1426   BP: (!) 179/105 135/90 (!) 179/104 144/91   BP Location:       Pulse: 97 94 88 89   Resp: 16 17 18    Temp: 98 3 °F (36 8 °C) 99 7 °F (37 6 °C) 98 2 °F (36 8 °C)    TempSrc:       SpO2: 95% 93% 94% 94%   Weight:           Most recent labs:    No results for input(s): WBC, HGB, HCT, PLT, K, NA, CALCIUM, BUN, CREATININE, LIPASE, AMYLASE, INR, TROPONINI, CKTOTAL, AST, ALT, ALKPHOS, BILITOT in the last 72 hours  Scheduled Meds:  Continuous Infusions:No current facility-administered medications for this encounter  PRN Meds:      Surgical procedures (if appropriate):

## 2022-10-10 ENCOUNTER — HOSPITAL ENCOUNTER (EMERGENCY)
Facility: HOSPITAL | Age: 57
Discharge: HOME | End: 2022-10-10
Attending: EMERGENCY MEDICINE
Payer: MEDICARE

## 2022-10-10 VITALS
TEMPERATURE: 97.8 F | RESPIRATION RATE: 16 BRPM | DIASTOLIC BLOOD PRESSURE: 92 MMHG | HEART RATE: 94 BPM | SYSTOLIC BLOOD PRESSURE: 137 MMHG | OXYGEN SATURATION: 94 %

## 2022-10-10 DIAGNOSIS — R10.10 PAIN OF UPPER ABDOMEN: ICD-10-CM

## 2022-10-10 DIAGNOSIS — R11.15 CYCLICAL VOMITING SYNDROME NOT ASSOCIATED WITH MIGRAINE: Primary | ICD-10-CM

## 2022-10-10 LAB
ALBUMIN SERPL BCP-MCNC: 4.3 G/DL (ref 3.5–5)
ALP SERPL-CCNC: 54 U/L (ref 34–104)
ALT SERPL W P-5'-P-CCNC: 19 U/L (ref 7–52)
ANION GAP SERPL CALCULATED.3IONS-SCNC: 10 MMOL/L (ref 4–13)
AST SERPL W P-5'-P-CCNC: 13 U/L (ref 13–39)
BASE EX.OXY STD BLDV CALC-SCNC: 95.2 % (ref 60–80)
BASE EXCESS BLDV CALC-SCNC: 1.5 MMOL/L
BASOPHILS # BLD AUTO: 0.04 THOUSANDS/ÂΜL (ref 0–0.1)
BASOPHILS NFR BLD AUTO: 0 % (ref 0–1)
BILIRUB SERPL-MCNC: 2.64 MG/DL (ref 0.2–1)
BUN SERPL-MCNC: 19 MG/DL (ref 5–25)
CALCIUM SERPL-MCNC: 9 MG/DL (ref 8.4–10.2)
CHLORIDE SERPL-SCNC: 99 MMOL/L (ref 96–108)
CO2 SERPL-SCNC: 26 MMOL/L (ref 21–32)
CREAT SERPL-MCNC: 0.9 MG/DL (ref 0.6–1.3)
EOSINOPHIL # BLD AUTO: 0 THOUSAND/ÂΜL (ref 0–0.61)
EOSINOPHIL NFR BLD AUTO: 0 % (ref 0–6)
ERYTHROCYTE [DISTWIDTH] IN BLOOD BY AUTOMATED COUNT: 12.3 % (ref 11.6–15.1)
GFR SERPL CREATININE-BSD FRML MDRD: 93 ML/MIN/1.73SQ M
GLUCOSE SERPL-MCNC: 192 MG/DL (ref 65–140)
GLUCOSE SERPL-MCNC: 197 MG/DL (ref 65–140)
HCO3 BLDV-SCNC: 25.1 MMOL/L (ref 24–30)
HCT VFR BLD AUTO: 50.1 % (ref 36.5–49.3)
HGB BLD-MCNC: 17.4 G/DL (ref 12–17)
IMM GRANULOCYTES # BLD AUTO: 0.11 THOUSAND/UL (ref 0–0.2)
IMM GRANULOCYTES NFR BLD AUTO: 1 % (ref 0–2)
LIPASE SERPL-CCNC: 18 U/L (ref 11–82)
LYMPHOCYTES # BLD AUTO: 2.18 THOUSANDS/ÂΜL (ref 0.6–4.47)
LYMPHOCYTES NFR BLD AUTO: 13 % (ref 14–44)
MCH RBC QN AUTO: 28.9 PG (ref 26.8–34.3)
MCHC RBC AUTO-ENTMCNC: 34.7 G/DL (ref 31.4–37.4)
MCV RBC AUTO: 83 FL (ref 82–98)
MONOCYTES # BLD AUTO: 0.7 THOUSAND/ÂΜL (ref 0.17–1.22)
MONOCYTES NFR BLD AUTO: 4 % (ref 4–12)
NEUTROPHILS # BLD AUTO: 13.61 THOUSANDS/ÂΜL (ref 1.85–7.62)
NEUTS SEG NFR BLD AUTO: 82 % (ref 43–75)
NRBC BLD AUTO-RTO: 0 /100 WBCS
O2 CT BLDV-SCNC: 22.7 ML/DL
PCO2 BLDV: 37.1 MM HG (ref 42–50)
PH BLDV: 7.45 [PH] (ref 7.3–7.4)
PLATELET # BLD AUTO: 371 THOUSANDS/UL (ref 149–390)
PMV BLD AUTO: 8.6 FL (ref 8.9–12.7)
PO2 BLDV: 99.1 MM HG (ref 35–45)
POTASSIUM SERPL-SCNC: 3.7 MMOL/L (ref 3.5–5.3)
PROT SERPL-MCNC: 6.7 G/DL (ref 6.4–8.4)
RBC # BLD AUTO: 6.02 MILLION/UL (ref 3.88–5.62)
SODIUM SERPL-SCNC: 135 MMOL/L (ref 135–147)
WBC # BLD AUTO: 16.64 THOUSAND/UL (ref 4.31–10.16)

## 2022-10-10 PROCEDURE — 82805 BLOOD GASES W/O2 SATURATION: CPT

## 2022-10-10 PROCEDURE — 36415 COLL VENOUS BLD VENIPUNCTURE: CPT

## 2022-10-10 PROCEDURE — 80053 COMPREHEN METABOLIC PANEL: CPT

## 2022-10-10 PROCEDURE — 99284 EMERGENCY DEPT VISIT MOD MDM: CPT

## 2022-10-10 PROCEDURE — 96361 HYDRATE IV INFUSION ADD-ON: CPT

## 2022-10-10 PROCEDURE — 85025 COMPLETE CBC W/AUTO DIFF WBC: CPT

## 2022-10-10 PROCEDURE — 82948 REAGENT STRIP/BLOOD GLUCOSE: CPT

## 2022-10-10 PROCEDURE — 96375 TX/PRO/DX INJ NEW DRUG ADDON: CPT

## 2022-10-10 PROCEDURE — 96374 THER/PROPH/DIAG INJ IV PUSH: CPT

## 2022-10-10 PROCEDURE — 99283 EMERGENCY DEPT VISIT LOW MDM: CPT

## 2022-10-10 PROCEDURE — 83690 ASSAY OF LIPASE: CPT

## 2022-10-10 RX ORDER — DIPHENHYDRAMINE HYDROCHLORIDE 50 MG/ML
25 INJECTION INTRAMUSCULAR; INTRAVENOUS ONCE
Status: COMPLETED | OUTPATIENT
Start: 2022-10-10 | End: 2022-10-10

## 2022-10-10 RX ORDER — METOCLOPRAMIDE HYDROCHLORIDE 5 MG/ML
10 INJECTION INTRAMUSCULAR; INTRAVENOUS ONCE
Status: COMPLETED | OUTPATIENT
Start: 2022-10-10 | End: 2022-10-10

## 2022-10-10 RX ADMIN — DIPHENHYDRAMINE HYDROCHLORIDE 25 MG: 50 INJECTION, SOLUTION INTRAMUSCULAR; INTRAVENOUS at 12:25

## 2022-10-10 RX ADMIN — SODIUM CHLORIDE 1000 ML: 0.9 INJECTION, SOLUTION INTRAVENOUS at 12:14

## 2022-10-10 RX ADMIN — METOCLOPRAMIDE 10 MG: 5 INJECTION, SOLUTION INTRAMUSCULAR; INTRAVENOUS at 12:24

## 2022-10-10 NOTE — ED PROVIDER NOTES
History  Chief Complaint   Patient presents with   • Vomiting     Pt states hes been vomiting for 3 days and is having a gastroparesis attack  C/o diffuse abd pain      59year old male presenting with "gastroparesis episode" and vomiting for the past 3 days  Patient also complains of diffuse abdominal pain, but more localized to the upper quadrants  Patient states that he has not had any blood in his vomit but he is currently nauseous  Patient states that he has been in the ER several times with similar symptoms and does not think this episode is any different from his past episodes  Patient states that he did feel some shortness of breath earlier today and yesterday and had to "think hard and try not to hyperventilate"  Patient states that he has no shortness of breath currently  Patient denies any chest pain, burning in his chest, difficulty swallowing, diarrhea, constipation, flank pain, change in his leg numbness/pain, or any other symptoms at this time  Prior to Admission Medications   Prescriptions Last Dose Informant Patient Reported? Taking? ALPRAZolam (XANAX) 0 5 mg tablet   No No   Sig: Take 1 tablet by mouth 2 (two) times a day as needed for anxiety for up to 10 days   Alcohol Swabs 70 % PADS   No No   Sig: May substitute brand based on insurance coverage  Check glucose TID  Insulin Pen Needle (BD Pen Needle Latisha 2nd Gen) 32G X 4 MM MISC   No No   Sig: For use with insulin pen  Pharmacy may dispense brand covered by insurance  Insulin Pen Needle (BD Pen Needle Latisha 2nd Gen) 32G X 4 MM MISC   No No   Sig: For use with insulin pen  Pharmacy may dispense brand covered by insurance  OneTouch Delica Lancets 29N MISC   No No   Sig: May substitute brand based on insurance coverage  Check glucose TID     Pantoprazole Sodium (PROTONIX PO)   Yes No   Sig: Take 20 mg by mouth   Zofran ODT 4 MG disintegrating tablet   No No   Sig: Take 1 tablet (4 mg total) by mouth every 6 (six) hours as needed for nausea or vomiting   gabapentin (NEURONTIN) 300 mg capsule   Yes No   Sig: Take 300 mg by mouth 2 (two) times a day   glucose blood (OneTouch Verio) test strip   No No   Sig: May substitute brand based on insurance coverage  Check glucose TID  insulin glargine (Lantus) 100 units/mL subcutaneous injection   No No   Sig: Inject 20 Units under the skin daily   insulin lispro (HumaLOG) 100 units/mL injection   No No   Sig: Inject 3 Units under the skin 3 (three) times a day with meals   lisinopril (ZESTRIL) 10 mg tablet   Yes No   Sig: Take 10 mg by mouth daily      Facility-Administered Medications: None       Past Medical History:   Diagnosis Date   • Diabetes mellitus ([facility] [address] )    • Gastroparesis    • Hypertension    • Neuropathy        Past Surgical History:   Procedure Laterality Date   • ME AMPUTATION FOOT,TRANSMETATARSAL Left 10/30/2017    Procedure: AMPUTATION TRANSMETATARSAL (TMA);  Surgeon: Wilner Perdue DPM;  Location: [facility];  Service: Podiatry   • ME AMPUTATION Radha Ky Left 11/3/2017    Procedure: AMPUTATION TRANSMETATARSAL (TMA), left foot;  Surgeon: Wilner Perdue DPM;  Location[facility];  Service: Podiatry       No family history on file  I have reviewed and agree with the history as documented  E-Cigarette/Vaping   • E-Cigarette Use Never User      E-Cigarette/Vaping Substances     Social History     Tobacco Use   • Smoking status: Current Some Day Smoker     Types: Cigars   • Smokeless tobacco: Never Used   Vaping Use   • Vaping Use: Never used   Substance Use Topics   • Alcohol use: No   • Drug use: Yes     Types: Marijuana       Review of Systems   Constitutional: Negative for chills, fever and unexpected weight change  HENT: Negative for ear pain, sore throat and trouble swallowing  Eyes: Negative for pain and visual disturbance  Respiratory: Positive for shortness of breath (brief episode yesterday and this morning  )   Negative for cough, choking, wheezing and stridor  Cardiovascular: Negative for chest pain, palpitations and leg swelling  Gastrointestinal: Positive for abdominal pain (Diffuse  Localized to R/LUQ), nausea and vomiting  Negative for blood in stool, constipation and diarrhea  Genitourinary: Negative for dysuria, flank pain and hematuria  Musculoskeletal: Negative for arthralgias and back pain  Skin: Negative for color change, pallor, rash and wound  Neurological: Negative for dizziness, seizures, syncope, weakness, numbness and headaches  All other systems reviewed and are negative  Physical Exam  Physical Exam  Vitals and nursing note reviewed  Constitutional:       General: He is not in acute distress  Appearance: He is well-developed  He is ill-appearing  He is not diaphoretic  HENT:      Head: Normocephalic and atraumatic  Eyes:      Conjunctiva/sclera: Conjunctivae normal    Cardiovascular:      Rate and Rhythm: Normal rate and regular rhythm  Pulses: Normal pulses  Heart sounds: Normal heart sounds  No murmur heard  No friction rub  No gallop  Pulmonary:      Effort: Pulmonary effort is normal  No respiratory distress  Breath sounds: Normal breath sounds  No stridor  No wheezing, rhonchi or rales  Chest:      Chest wall: No tenderness  Abdominal:      General: Abdomen is flat  There is distension (Due to obesity)  Palpations: Abdomen is soft  There is no mass  Tenderness: There is abdominal tenderness  There is no right CVA tenderness, left CVA tenderness, guarding or rebound  Hernia: No hernia is present  Musculoskeletal:         General: Tenderness (No change from baseline peripheral neuropathy) and deformity (Amputated great left toe) present  No swelling or signs of injury  Normal range of motion  Cervical back: Neck supple  No rigidity or tenderness  Right lower leg: No edema  Left lower leg: No edema  Skin:     General: Skin is warm and dry  Capillary Refill: Capillary refill takes less than 2 seconds  Coloration: Skin is not jaundiced or pale  Findings: No bruising, erythema, lesion or rash  Neurological:      Mental Status: He is alert           Vital Signs  ED Triage Vitals   Temperature Pulse Respirations Blood Pressure SpO2   10/10/22 1135 10/10/22 1135 10/10/22 1135 10/10/22 1135 10/10/22 1135   97 8 °F (36 6 °C) 103 16 155/87 97 %      Temp src Heart Rate Source Patient Position - Orthostatic VS BP Location FiO2 (%)   -- 10/10/22 1347 10/10/22 1347 10/10/22 1347 --    Monitor Lying Right arm       Pain Score       10/10/22 1135       8           Vitals:    10/10/22 1135 10/10/22 1347   BP: 155/87 137/92   Pulse: 103 94   Patient Position - Orthostatic VS:  Lying         Visual Acuity      ED Medications  Medications   sodium chloride 0 9 % bolus 1,000 mL (0 mL Intravenous Stopped 10/10/22 1347)   metoclopramide (REGLAN) injection 10 mg (10 mg Intravenous Given 10/10/22 1224)   diphenhydrAMINE (BENADRYL) injection 25 mg (25 mg Intravenous Given 10/10/22 1225)       Diagnostic Studies  Results Reviewed     Procedure Component Value Units Date/Time    Blood gas, venous [[id number]]  (Abnormal) Collected: 10/10/22 1229    Lab Status: Final result Specimen: Blood from Arm, Left Updated: 10/10/22 1350     pH, Eddy 7 449     pCO2, Eddy 37 1 mm Hg      pO2, Eddy 99 1 mm Hg      HCO3, Eddy 25 1 mmol/L      Base Excess, Eddy 1 5 mmol/L      O2 Content, Eddy 22 7 ml/dL      O2 HGB, VENOUS 95 2 %     Lipase [[id number]]  (Normal) Collected: 10/10/22 1214    Lab Status: Final result Specimen: Blood from Arm, Right Updated: 10/10/22 1243     Lipase 18 u/L     Comprehensive metabolic panel [[id number]]  (Abnormal) Collected: 10/10/22 1214    Lab Status: Final result Specimen: Blood from Arm, Right Updated: 10/10/22 1243     Sodium 135 mmol/L      Potassium 3 7 mmol/L      Chloride 99 mmol/L      CO2 26 mmol/L      ANION GAP 10 mmol/L      BUN 19 mg/dL Creatinine 0 90 mg/dL      Glucose 197 mg/dL      Calcium 9 0 mg/dL      AST 13 U/L      ALT 19 U/L      Alkaline Phosphatase 54 U/L      Total Protein 6 7 g/dL      Albumin 4 3 g/dL      Total Bilirubin 2 64 mg/dL      eGFR 93 ml/min/1 73sq m     Narrative:      [facility] guidelines for Chronic Kidney Disease (CKD):   •  Stage 1 with normal or high GFR (GFR > 90 mL/min/1 73 square meters)  •  Stage 2 Mild CKD (GFR = 60-89 mL/min/1 73 square meters)  •  Stage 3A Moderate CKD (GFR = 45-59 mL/min/1 73 square meters)  •  Stage 3B Moderate CKD (GFR = 30-44 mL/min/1 73 square meters)  •  Stage 4 Severe CKD (GFR = 15-29 mL/min/1 73 square meters)  •  Stage 5 End Stage CKD (GFR <15 mL/min/1 73 square meters)  Note: GFR calculation is accurate only with a steady state creatinine    CBC and differential [[id number]]  (Abnormal) Collected: 10/10/22 1214    Lab Status: Final result Specimen: Blood from Arm, Right Updated: 10/10/22 1223     WBC 16 64 Thousand/uL      RBC 6 02 Million/uL      Hemoglobin 17 4 g/dL      Hematocrit 50 1 %      MCV 83 fL      MCH 28 9 pg      MCHC 34 7 g/dL      RDW 12 3 %      MPV 8 6 fL      Platelets 381 Thousands/uL      nRBC 0 /100 WBCs      Neutrophils Relative 82 %      Immat GRANS % 1 %      Lymphocytes Relative 13 %      Monocytes Relative 4 %      Eosinophils Relative 0 %      Basophils Relative 0 %      Neutrophils Absolute 13 61 Thousands/µL      Immature Grans Absolute 0 11 Thousand/uL      Lymphocytes Absolute 2 18 Thousands/µL      Monocytes Absolute 0 70 Thousand/µL      Eosinophils Absolute 0 00 Thousand/µL      Basophils Absolute 0 04 Thousands/µL     Fingerstick Glucose (POCT) [[id number]]  (Abnormal) Collected: 10/10/22 1138    Lab Status: Final result Updated: 10/10/22 1139     POC Glucose 192 mg/dl                  No orders to display              Procedures  Procedures         ED Course  ED Course as of 10/10/22 2006   Mon Oct 10, 2022   1318 WBC(!): 16 64  Similar to past episodes - patient denies fevers/chills/specific area of pain  MDM  Number of Diagnoses or Management Options  Cyclical vomiting syndrome not associated with migraine  Pain of upper abdomen  Diagnosis management comments: 59year old male presenting with signs and symptoms of acute gastroparesis flare  This flare is described as the same as his previous episodes  CBC  CMP  Lipase  VBG since patient has been vomiting for three days  Patient states that the last cocktail of medications that he received worked for his  Will provide the same treatment for him in the ER  Discussed with patient the need for GI follow up and he was agreeable to this recommendation  Ambulatory referral sent  Patient did not need more Zofran as he has plenty at home  Disposition  Final diagnoses:   Cyclical vomiting syndrome not associated with migraine   Pain of upper abdomen     Time reflects when diagnosis was documented in both MDM as applicable and the Disposition within this note     Time User Action Codes Description Comment    10/10/2022  2:15 PM Joel Hernandez [T40 89] Cyclical vomiting syndrome not associated with migraine     10/10/2022  2:15 PM Joel Hernandez [R10 10] Pain of upper abdomen       ED Disposition     ED Disposition   Discharge    Condition   Stable    Date/Time   Mon Oct 10, 2022  2:15 PM    [address] [facility] [address] discharge to home/self care                 Follow-up Information     Follow up With Specialties Details Why Contact Info Additional [address] Emergency Department Emergency Medicine Go to  If symptoms worsen [address] Emergency Department, [address], [facility] Gastroenterology Schedule an appointment as soon as possible for a visit   [address] [phone] [address] [facility] [place], [address], [address], [address] [phone]          Discharge Medication List as of 10/10/2022  2:16 PM      CONTINUE these medications which have NOT CHANGED    Details   Alcohol Swabs 70 % PADS May substitute brand based on insurance coverage  Check glucose TID , Normal      ALPRAZolam (XANAX) 0 5 mg tablet Take 1 tablet by mouth 2 (two) times a day as needed for anxiety for up to 10 days, Starting Tue 11/7/2017, Until Sat 7/2/2022 at 2359, Print      gabapentin (NEURONTIN) 300 mg capsule Take 300 mg by mouth 2 (two) times a day, Historical Med      glucose blood (OneTouch Verio) test strip May substitute brand based on insurance coverage  Check glucose TID , Normal      insulin glargine (Lantus) 100 units/mL subcutaneous injection Inject 20 Units under the skin daily, Starting Sun 7/3/2022, Normal      insulin lispro (HumaLOG) 100 units/mL injection Inject 3 Units under the skin 3 (three) times a day with meals, Starting Sun 7/3/2022, Normal      !! Insulin Pen Needle (BD Pen Needle Latisha 2nd Gen) 32G X 4 MM MISC For use with insulin pen  Pharmacy may dispense brand covered by insurance , Normal      !! Insulin Pen Needle (BD Pen Needle Latisha 2nd Gen) 32G X 4 MM MISC For use with insulin pen  Pharmacy may dispense brand covered by insurance , Normal      lisinopril (ZESTRIL) 10 mg tablet Take 10 mg by mouth daily, Historical Med      OneTouch Delica Lancets 02D MISC May substitute brand based on insurance coverage   Check glucose TID , Normal      Pantoprazole Sodium (PROTONIX PO) Take 20 mg by mouth, Historical Med      Zofran ODT 4 MG disintegrating tablet Take 1 tablet (4 mg total) by mouth every 6 (six) hours as needed for nausea or vomiting, Starting Sun 7/3/2022, Until Tue 8/2/2022 at 2359, Normal       !! - Potential duplicate medications found  Please discuss with provider                PDMP Review       Value Time User    PDMP Reviewed  Yes 1/25/2022 10:27 PM Tunde Wiggins MD          ED Provider  Electronically Signed by           Aleksandr Freeman PA-C  10/10/22 2007

## 2022-10-12 PROBLEM — A41.9 SEPSIS (HCC): Status: RESOLVED | Noted: 2017-10-30 | Resolved: 2022-10-12

## 2022-10-18 ENCOUNTER — HOSPITAL ENCOUNTER (EMERGENCY)
Facility: HOSPITAL | Age: 57
Discharge: HOME | End: 2022-10-18
Attending: EMERGENCY MEDICINE
Payer: MEDICARE

## 2022-10-18 VITALS
HEART RATE: 103 BPM | OXYGEN SATURATION: 97 % | WEIGHT: 235.01 LBS | TEMPERATURE: 98.6 F | BODY MASS INDEX: 29.37 KG/M2 | DIASTOLIC BLOOD PRESSURE: 96 MMHG | SYSTOLIC BLOOD PRESSURE: 162 MMHG | RESPIRATION RATE: 16 BRPM

## 2022-10-18 DIAGNOSIS — R10.9 ABDOMINAL PAIN: Primary | ICD-10-CM

## 2022-10-18 DIAGNOSIS — R11.2 NAUSEA AND VOMITING: ICD-10-CM

## 2022-10-18 LAB
ALBUMIN SERPL BCP-MCNC: 4.4 G/DL (ref 3.5–5)
ALP SERPL-CCNC: 54 U/L (ref 34–104)
ALT SERPL W P-5'-P-CCNC: 22 U/L (ref 7–52)
ANION GAP SERPL CALCULATED.3IONS-SCNC: 8 MMOL/L (ref 4–13)
AST SERPL W P-5'-P-CCNC: 15 U/L (ref 13–39)
BASOPHILS # BLD AUTO: 0.06 THOUSANDS/ÂΜL (ref 0–0.1)
BASOPHILS NFR BLD AUTO: 1 % (ref 0–1)
BILIRUB SERPL-MCNC: 1.25 MG/DL (ref 0.2–1)
BUN SERPL-MCNC: 14 MG/DL (ref 5–25)
CALCIUM SERPL-MCNC: 8.6 MG/DL (ref 8.4–10.2)
CARDIAC TROPONIN I PNL SERPL HS: 3 NG/L
CHLORIDE SERPL-SCNC: 98 MMOL/L (ref 96–108)
CO2 SERPL-SCNC: 27 MMOL/L (ref 21–32)
CREAT SERPL-MCNC: 1.1 MG/DL (ref 0.6–1.3)
EOSINOPHIL # BLD AUTO: 0.03 THOUSAND/ÂΜL (ref 0–0.61)
EOSINOPHIL NFR BLD AUTO: 0 % (ref 0–6)
ERYTHROCYTE [DISTWIDTH] IN BLOOD BY AUTOMATED COUNT: 12.5 % (ref 11.6–15.1)
GFR SERPL CREATININE-BSD FRML MDRD: 73 ML/MIN/1.73SQ M
GLUCOSE SERPL-MCNC: 159 MG/DL (ref 65–140)
GLUCOSE SERPL-MCNC: 163 MG/DL (ref 65–140)
HCT VFR BLD AUTO: 50.8 % (ref 36.5–49.3)
HGB BLD-MCNC: 17.6 G/DL (ref 12–17)
IMM GRANULOCYTES # BLD AUTO: 0.04 THOUSAND/UL (ref 0–0.2)
IMM GRANULOCYTES NFR BLD AUTO: 1 % (ref 0–2)
LIPASE SERPL-CCNC: 45 U/L (ref 11–82)
LYMPHOCYTES # BLD AUTO: 1.91 THOUSANDS/ÂΜL (ref 0.6–4.47)
LYMPHOCYTES NFR BLD AUTO: 25 % (ref 14–44)
MCH RBC QN AUTO: 29.6 PG (ref 26.8–34.3)
MCHC RBC AUTO-ENTMCNC: 34.6 G/DL (ref 31.4–37.4)
MCV RBC AUTO: 85 FL (ref 82–98)
MONOCYTES # BLD AUTO: 1.13 THOUSAND/ÂΜL (ref 0.17–1.22)
MONOCYTES NFR BLD AUTO: 15 % (ref 4–12)
NEUTROPHILS # BLD AUTO: 4.57 THOUSANDS/ÂΜL (ref 1.85–7.62)
NEUTS SEG NFR BLD AUTO: 58 % (ref 43–75)
NRBC BLD AUTO-RTO: 0 /100 WBCS
PLATELET # BLD AUTO: 271 THOUSANDS/UL (ref 149–390)
PMV BLD AUTO: 9.1 FL (ref 8.9–12.7)
POTASSIUM SERPL-SCNC: 4 MMOL/L (ref 3.5–5.3)
PROT SERPL-MCNC: 6.8 G/DL (ref 6.4–8.4)
RBC # BLD AUTO: 5.95 MILLION/UL (ref 3.88–5.62)
SODIUM SERPL-SCNC: 133 MMOL/L (ref 135–147)
WBC # BLD AUTO: 7.74 THOUSAND/UL (ref 4.31–10.16)

## 2022-10-18 PROCEDURE — 82948 REAGENT STRIP/BLOOD GLUCOSE: CPT

## 2022-10-18 PROCEDURE — 85025 COMPLETE CBC W/AUTO DIFF WBC: CPT

## 2022-10-18 PROCEDURE — 83690 ASSAY OF LIPASE: CPT

## 2022-10-18 PROCEDURE — 80053 COMPREHEN METABOLIC PANEL: CPT

## 2022-10-18 PROCEDURE — 99284 EMERGENCY DEPT VISIT MOD MDM: CPT

## 2022-10-18 PROCEDURE — 99285 EMERGENCY DEPT VISIT HI MDM: CPT | Performed by: EMERGENCY MEDICINE

## 2022-10-18 PROCEDURE — 96374 THER/PROPH/DIAG INJ IV PUSH: CPT

## 2022-10-18 PROCEDURE — 96375 TX/PRO/DX INJ NEW DRUG ADDON: CPT

## 2022-10-18 PROCEDURE — 36415 COLL VENOUS BLD VENIPUNCTURE: CPT

## 2022-10-18 PROCEDURE — 84484 ASSAY OF TROPONIN QUANT: CPT

## 2022-10-18 RX ORDER — METOCLOPRAMIDE HYDROCHLORIDE 5 MG/ML
10 INJECTION INTRAMUSCULAR; INTRAVENOUS ONCE
Status: COMPLETED | OUTPATIENT
Start: 2022-10-18 | End: 2022-10-18

## 2022-10-18 RX ORDER — DIPHENHYDRAMINE HYDROCHLORIDE 50 MG/ML
25 INJECTION INTRAMUSCULAR; INTRAVENOUS ONCE
Status: COMPLETED | OUTPATIENT
Start: 2022-10-18 | End: 2022-10-18

## 2022-10-18 RX ADMIN — DIPHENHYDRAMINE HYDROCHLORIDE 25 MG: 50 INJECTION, SOLUTION INTRAMUSCULAR; INTRAVENOUS at 09:36

## 2022-10-18 RX ADMIN — METOCLOPRAMIDE 10 MG: 5 INJECTION, SOLUTION INTRAMUSCULAR; INTRAVENOUS at 09:39

## 2022-10-18 NOTE — ED ATTENDING ATTESTATION
10/18/2022  IVincent DO, saw and evaluated the patient  I have discussed the patient with the resident/non-physician practitioner and agree with the resident's/non-physician practitioner's findings, Plan of Care, and MDM as documented in the resident's/non-physician practitioner's note, except where noted  All available labs and Radiology studies were reviewed  I was present for key portions of any procedure(s) performed by the resident/non-physician practitioner and I was immediately available to provide assistance  At this point I agree with the current assessment done in the Emergency Department  I have conducted an independent evaluation of this patient a history and physical is as follows:    77-year-old male presents with nausea, vomiting, has had a flare of this over the past couple of weeks  Has has been seen here previously, says today's episode feels identical to previous patient's of his cyclic vomiting syndrome  He has been told he likely has diabetic gastroparesis  , will so did have a long conversation that he does smoke marijuana practically daily, and this could be playing a role as well  Patient had decrease in solid intake over the past week or 2  Reviewing charts patient has lost 4 kg since his last visit  He says he has not been trying to lose weight, this is from not eating  He has also been under large amount of stress  Says that his mother is very ill and on chemo, this is been a major stressor for him  Normal bowel movements normal urination, normal flatus      Review of Systems   Constitutional: Negative for activity change, chills, diaphoresis and fever  HENT: Negative for congestion, sinus pressure and sore throat  Eyes: Negative for pain and visual disturbance  Respiratory: Negative for cough, chest tightness, shortness of breath, wheezing and stridor  Cardiovascular: Negative for chest pain and palpitations     Gastrointestinal: Negative for abdominal distention, positive for abdominal pain, negative for constipation, diarrhea, positive for nausea and vomiting  Genitourinary: Negative for dysuria and frequency  Musculoskeletal: Negative for neck pain and neck stiffness  Skin: Negative for rash  Neurological: Negative for dizziness, speech difficulty, light-headedness, numbness and headaches  Wt Readings from Last 3 Encounters:   10/18/22 107 kg (235 lb 0 2 oz)   07/02/22 110 kg (243 lb)   01/26/22 106 kg (233 lb 11 oz)         Physical Exam  Vitals reviewed  Constitutional:       General: He is not in acute distress  Appearance: He is well-developed  He is not diaphoretic  HENT:      Head: Normocephalic and atraumatic  Right Ear: External ear normal       Left Ear: External ear normal       Nose: Nose normal    Eyes:      General:         Right eye: No discharge  Left eye: No discharge  Pupils: Pupils are equal, round, and reactive to light  Neck:      Trachea: No tracheal deviation  Cardiovascular:      Rate and Rhythm: Normal rate and regular rhythm  Heart sounds: Normal heart sounds  No murmur heard  Pulmonary:      Effort: Pulmonary effort is normal  No respiratory distress  Breath sounds: Normal breath sounds  No stridor  Abdominal:      General: There is no distension  Palpations: Abdomen is soft  Tenderness: There is no abdominal tenderness  There is no guarding or rebound  Musculoskeletal:         General: Normal range of motion  Cervical back: Normal range of motion and neck supple  Skin:     General: Skin is warm and dry  Coloration: Skin is not pale  Findings: No erythema  Neurological:      General: No focal deficit present  Mental Status: He is alert and oriented to person, place, and time              No orders to display           Labs Reviewed   CBC AND DIFFERENTIAL - Abnormal       Result Value Ref Range Status    WBC 7 74  4 31 - 10 16 Thousand/uL Final RBC 5 95 (*) 3 88 - 5 62 Million/uL Final    Hemoglobin 17 6 (*) 12 0 - 17 0 g/dL Final    Hematocrit 50 8 (*) 36 5 - 49 3 % Final    MCV 85  82 - 98 fL Final    MCH 29 6  26 8 - 34 3 pg Final    MCHC 34 6  31 4 - 37 4 g/dL Final    RDW 12 5  11 6 - 15 1 % Final    MPV 9 1  8 9 - 12 7 fL Final    Platelets 392  955 - 390 Thousands/uL Final    nRBC 0  /100 WBCs Final    Neutrophils Relative 58  43 - 75 % Final    Immat GRANS % 1  0 - 2 % Final    Lymphocytes Relative 25  14 - 44 % Final    Monocytes Relative 15 (*) 4 - 12 % Final    Eosinophils Relative 0  0 - 6 % Final    Basophils Relative 1  0 - 1 % Final    Neutrophils Absolute 4 57  1 85 - 7 62 Thousands/µL Final    Immature Grans Absolute 0 04  0 00 - 0 20 Thousand/uL Final    Lymphocytes Absolute 1 91  0 60 - 4 47 Thousands/µL Final    Monocytes Absolute 1 13  0 17 - 1 22 Thousand/µL Final    Eosinophils Absolute 0 03  0 00 - 0 61 Thousand/µL Final    Basophils Absolute 0 06  0 00 - 0 10 Thousands/µL Final   COMPREHENSIVE METABOLIC PANEL - Abnormal    Sodium 133 (*) 135 - 147 mmol/L Final    Potassium 4 0  3 5 - 5 3 mmol/L Final    Chloride 98  96 - 108 mmol/L Final    CO2 27  21 - 32 mmol/L Final    ANION GAP 8  4 - 13 mmol/L Final    BUN 14  5 - 25 mg/dL Final    Creatinine 1 10  0 60 - 1 30 mg/dL Final    Comment: Standardized to IDMS reference method    Glucose 159 (*) 65 - 140 mg/dL Final    Comment: If the patient is fasting, the ADA then defines impaired fasting glucose as > 100 mg/dL and diabetes as > or equal to 123 mg/dL  Specimen collection should occur prior to Sulfasalazine administration due to the potential for falsely depressed results  Specimen collection should occur prior to Sulfapyridine administration due to the potential for falsely elevated results      Calcium 8 6  8 4 - 10 2 mg/dL Final    AST 15  13 - 39 U/L Final    Comment: Specimen collection should occur prior to Sulfasalazine administration due to the potential for falsely depressed results  ALT 22  7 - 52 U/L Final    Comment: Specimen collection should occur prior to Sulfasalazine administration due to the potential for falsely depressed results  Alkaline Phosphatase 54  34 - 104 U/L Final    Total Protein 6 8  6 4 - 8 4 g/dL Final    Albumin 4 4  3 5 - 5 0 g/dL Final    Total Bilirubin 1 25 (*) 0 20 - 1 00 mg/dL Final    eGFR 73  ml/min/1 73sq m Final    Narrative:     [facility] guidelines for Chronic Kidney Disease (CKD):   •  Stage 1 with normal or high GFR (GFR > 90 mL/min/1 73 square meters)  •  Stage 2 Mild CKD (GFR = 60-89 mL/min/1 73 square meters)  •  Stage 3A Moderate CKD (GFR = 45-59 mL/min/1 73 square meters)  •  Stage 3B Moderate CKD (GFR = 30-44 mL/min/1 73 square meters)  •  Stage 4 Severe CKD (GFR = 15-29 mL/min/1 73 square meters)  •  Stage 5 End Stage CKD (GFR <15 mL/min/1 73 square meters)  Note: GFR calculation is accurate only with a steady state creatinine   POCT GLUCOSE - Abnormal    POC Glucose 163 (*) 65 - 140 mg/dl Final   HS TROPONIN I 0HR - Normal    hs TnI 0hr 3  "Refer to ACS Flowchart"- see link ng/L Final    Comment:                                              Initial (time 0) result  If >=50 ng/L, Myocardial injury suggested ;  Type of myocardial injury and treatment strategy  to be determined  If 5-49 ng/L, a delta result at 2 hours and or 4 hours will be needed to further evaluate  If <4 ng/L, and chest pain has been >3 hours since onset, patient may qualify for discharge based on the HEART score in the ED  If <5 ng/L and <3hours since onset of chest pain, a delta result at 2 hours will be needed to further evaluate  HS Troponin 99th Percentile URL of a Health Population=12 ng/L with a 95% Confidence Interval of 8-18 ng/L  Second Troponin (time 2 hours)  If calculated delta >= 20 ng/L,  Myocardial injury suggested ; Type of myocardial injury and treatment strategy to be determined    If 5-49 ng/L and the calculated delta is 5-19 ng/L, consult medical service for evaluation  Continue evaluation for ischemia on ecg and other possible etiology and repeat hs troponin at 4 hours  If delta is <5 ng/L at 2 hours, consider discharge based on risk stratification via the HEART score (if in ED), or ROBERT risk score in IP/Observation  HS Troponin 99th Percentile URL of a Health Population=12 ng/L with a 95% Confidence Interval of 8-18 ng/L  LIPASE - Normal    Lipase 45  11 - 82 u/L Final   HS TROPONIN I 2HR             ED Course         Critical Care Time  Procedures      MDM  Number of Diagnoses or Management Options  Abdominal pain: new, needed workup  Nausea and vomiting: new, needed workup  Diagnosis management comments:       Initial ED assessment:  55-year-old male, history of diabetes questionable diagnosis of diabetic gastroparesis questionable diagnosis of cannabis hyperemesis syndrome  Presents with a episode of cyclic vomiting    Initial DDx includes but is not limited to:   Cyclic vomiting secondary to gastroparesis verses cannabis hyperemesis  Versus stress  Doubt more sinister intra-abdominal pathology due to nontender abdomen  Initial ED plan:   Blood work IV fluids antiemetics        Final ED summary/disposition:   After evaluation and workup in the emergency department, patient with large amount of improvement  , discussed abstinence from marijuana, will follow with his PCP and GI         Amount and/or Complexity of Data Reviewed  Clinical lab tests: ordered and reviewed  Review and summarize past medical records: yes            Time reflects when diagnosis was documented in both MDM as applicable and the Disposition within this note     Time User Action Codes Description Comment    10/18/2022 10:16 AM Bárbara President Add [R10 9] Abdominal pain     10/18/2022 10:16 AM Governor Michael [R11 0] Nausea     10/18/2022 10:16 AM Bárbara President Add [R11 10] Vomiting     10/18/2022 10:16 AM Bárbara President Remove [R11 10] Vomiting     10/18/2022 10:16 AM Josh Norton Remove [R11 0] Nausea     10/18/2022 10:16 AM Josh Norton Add [R11 2] Nausea and vomiting       ED Disposition     ED Disposition   Discharge    Condition   Stable    Date/Time   Tue Oct 18, 2022 10:23 AM    Comment   [address] [facility] discharge to home/self care                 Follow-up Information     Follow up With Specialties Details Why Contact Info Additional [address] Emergency Department Emergency Medicine Go to  If symptoms worsen [id number] [facility] Emergency Department, [address], [place], [place], [address]

## 2022-10-18 NOTE — DISCHARGE INSTRUCTIONS
Return to the ER if you develop: Worsening abdominal pain, chest pain, shortness of breath, severe nausea and vomiting, dizziness

## 2022-10-18 NOTE — ED PROVIDER NOTES
History  Chief Complaint   Patient presents with   • Abdominal Pain     Hx of gastroparesis  Feels like an "attack" for 4 days  (+) Nausea and vomiting  61yo M w/ h/o DM presenting for Abd  Pain  Of note, he presented to the ED 4d ago for which he was treated for diabetic gastroparesis  He improved and was discharged home  Had no issues until 2d, where he developed constant diffuse abdominal pain that has been getting better  He describes it as a sharp pain  Denies that it is worsened with eating, however, he has reduced his PO intake to liquids and solids  Associated with right-sided back pain, production of dark urine, N/V  Endorses ability to pass flatulence and stool, and a daily marijuana use  Denies dysuria, SOB, CP, numbness, dizziness, tobacco/EtOH use, abdominal surgery, hematochezzia, F/C  He states he has an appointment with GI to have his possible gastroparesis worked up further  History provided by:  Patient      Prior to Admission Medications   Prescriptions Last Dose Informant Patient Reported? Taking? ALPRAZolam (XANAX) 0 5 mg tablet   No No   Sig: Take 1 tablet by mouth 2 (two) times a day as needed for anxiety for up to 10 days   Alcohol Swabs 70 % PADS   No No   Sig: May substitute brand based on insurance coverage  Check glucose TID  Insulin Pen Needle (BD Pen Needle Latisha 2nd Gen) 32G X 4 MM MISC   No No   Sig: For use with insulin pen  Pharmacy may dispense brand covered by insurance  Insulin Pen Needle (BD Pen Needle Latisha 2nd Gen) 32G X 4 MM MISC   No No   Sig: For use with insulin pen  Pharmacy may dispense brand covered by insurance  OneTouch Delica Lancets 12E MISC   No No   Sig: May substitute brand based on insurance coverage  Check glucose TID     Pantoprazole Sodium (PROTONIX PO)   Yes No   Sig: Take 20 mg by mouth   Zofran ODT 4 MG disintegrating tablet   No No   Sig: Take 1 tablet (4 mg total) by mouth every 6 (six) hours as needed for nausea or vomiting   gabapentin (NEURONTIN) 300 mg capsule   Yes No   Sig: Take 300 mg by mouth 2 (two) times a day   glucose blood (OneTouch Verio) test strip   No No   Sig: May substitute brand based on insurance coverage  Check glucose TID  insulin glargine (Lantus) 100 units/mL subcutaneous injection   No No   Sig: Inject 20 Units under the skin daily   insulin lispro (HumaLOG) 100 units/mL injection   No No   Sig: Inject 3 Units under the skin 3 (three) times a day with meals   lisinopril (ZESTRIL) 10 mg tablet   Yes No   Sig: Take 10 mg by mouth daily      Facility-Administered Medications: None       Past Medical History:   Diagnosis Date   • Diabetes mellitus ([facility] [address] )    • Gastroparesis    • Hypertension    • Neuropathy        Past Surgical History:   Procedure Laterality Date   • [place] AMPUTATION FOOT,TRANSMETATARSAL Left 10/30/2017    Procedure: AMPUTATION TRANSMETATARSAL (TMA);  Surgeon: Criss Conti DPM;  Location: [facility];  Service: Podiatry   • [place] AMPUTATION Marcus Dimitri Left 11/3/2017    Procedure: AMPUTATION TRANSMETATARSAL (TMA), left foot;  Surgeon: Criss Conti DPM;  Location: [facility];  Service: Podiatry       History reviewed  No pertinent family history  I have reviewed and agree with the history as documented  E-Cigarette/Vaping   • E-Cigarette Use Never User      E-Cigarette/Vaping Substances     Social History     Tobacco Use   • Smoking status: Current Some Day Smoker     Types: Cigars   • Smokeless tobacco: Never Used   Vaping Use   • Vaping Use: Never used   Substance Use Topics   • Alcohol use: No   • Drug use: Yes     Types: Marijuana        Review of Systems   Constitutional: Positive for appetite change  Negative for chills and fever  HENT: Negative  Respiratory: Negative  Cardiovascular: Negative  Gastrointestinal: Positive for abdominal pain, nausea and vomiting  Negative for blood in stool, constipation and diarrhea  Endocrine: Negative  Genitourinary: Negative  Musculoskeletal: Positive for back pain  Negative for gait problem  Skin: Negative  Neurological: Negative  Psychiatric/Behavioral: Negative  Physical Exam  ED Triage Vitals [10/18/22 0830]   Temperature Pulse Respirations Blood Pressure SpO2   98 6 °F (37 °C) 103 16 162/96 97 %      Temp Source Heart Rate Source Patient Position - Orthostatic VS BP Location FiO2 (%)   Oral Monitor Sitting Right arm --      Pain Score       --             Orthostatic Vital Signs  Vitals:    10/18/22 0830   BP: 162/96   Pulse: 103   Patient Position - Orthostatic VS: Sitting       Physical Exam  Constitutional:       General: He is not in acute distress  Appearance: He is well-developed  HENT:      Head: Normocephalic and atraumatic  Right Ear: External ear normal       Left Ear: External ear normal       Nose: Nose normal  No rhinorrhea  Eyes:      Extraocular Movements: Extraocular movements intact  Conjunctiva/sclera: Conjunctivae normal    Cardiovascular:      Rate and Rhythm: Regular rhythm  Tachycardia present  Pulses: Normal pulses  Heart sounds: Normal heart sounds  Pulmonary:      Effort: Pulmonary effort is normal       Breath sounds: Normal breath sounds  Abdominal:      General: Abdomen is flat  Bowel sounds are normal       Tenderness: There is abdominal tenderness (diffuse)  Musculoskeletal:         General: No tenderness  Right lower leg: No edema  Left lower leg: No edema  Skin:     General: Skin is warm and dry  Capillary Refill: Capillary refill takes 2 to 3 seconds  Neurological:      General: No focal deficit present  Mental Status: He is alert and oriented to person, place, and time  Motor: No weakness     Psychiatric:         Mood and Affect: Mood normal          Behavior: Behavior normal          ED Medications  Medications   metoclopramide (REGLAN) injection 10 mg (10 mg Intravenous Given 10/18/22 0939)   diphenhydrAMINE (BENADRYL) injection 25 mg (25 mg Intravenous Given 10/18/22 0936)       Diagnostic Studies  Results Reviewed     Procedure Component Value Units Date/Time    HS Troponin I 4hr [[id number]]     Lab Status: No result Specimen: Blood     HS Troponin I 2hr [[id number]]     Lab Status: No result Specimen: Blood     HS Troponin 0hr (reflex protocol) [[id number]]  (Normal) Collected: 10/18/22 0904    Lab Status: Final result Specimen: Blood from Arm, Left Updated: 10/18/22 1017     hs TnI 0hr 3 ng/L     Comprehensive metabolic panel [[id number]]  (Abnormal) Collected: 10/18/22 0904    Lab Status: Final result Specimen: Blood from Arm, Left Updated: 10/18/22 0951     Sodium 133 mmol/L      Potassium 4 0 mmol/L      Chloride 98 mmol/L      CO2 27 mmol/L      ANION GAP 8 mmol/L      BUN 14 mg/dL      Creatinine 1 10 mg/dL      Glucose 159 mg/dL      Calcium 8 6 mg/dL      AST 15 U/L      ALT 22 U/L      Alkaline Phosphatase 54 U/L      Total Protein 6 8 g/dL      Albumin 4 4 g/dL      Total Bilirubin 1 25 mg/dL      eGFR 73 ml/min/1 73sq m     Narrative:      [place] guidelines for Chronic Kidney Disease (CKD):   •  Stage 1 with normal or high GFR (GFR > 90 mL/min/1 73 square meters)  •  Stage 2 Mild CKD (GFR = 60-89 mL/min/1 73 square meters)  •  Stage 3A Moderate CKD (GFR = 45-59 mL/min/1 73 square meters)  •  Stage 3B Moderate CKD (GFR = 30-44 mL/min/1 73 square meters)  •  Stage 4 Severe CKD (GFR = 15-29 mL/min/1 73 square meters)  •  Stage 5 End Stage CKD (GFR <15 mL/min/1 73 square meters)  Note: GFR calculation is accurate only with a steady state creatinine    Lipase [[id number]]  (Normal) Collected: 10/18/22 0904    Lab Status: Final result Specimen: Blood from Arm, Left Updated: 10/18/22 0951     Lipase 45 u/L     CBC and differential [[id number]]  (Abnormal) Collected: 10/18/22 0904    Lab Status: Final result Specimen: Blood from Arm, Left Updated: 10/18/22 0920     WBC 7 74 Thousand/uL      RBC 5 95 Million/uL      Hemoglobin 17 6 g/dL      Hematocrit 50 8 %      MCV 85 fL      MCH 29 6 pg      MCHC 34 6 g/dL      RDW 12 5 %      MPV 9 1 fL      Platelets 309 Thousands/uL      nRBC 0 /100 WBCs      Neutrophils Relative 58 %      Immat GRANS % 1 %      Lymphocytes Relative 25 %      Monocytes Relative 15 %      Eosinophils Relative 0 %      Basophils Relative 1 %      Neutrophils Absolute 4 57 Thousands/µL      Immature Grans Absolute 0 04 Thousand/uL      Lymphocytes Absolute 1 91 Thousands/µL      Monocytes Absolute 1 13 Thousand/µL      Eosinophils Absolute 0 03 Thousand/µL      Basophils Absolute 0 06 Thousands/µL     Fingerstick Glucose (POCT) [[id number]]  (Abnormal) Collected: 10/18/22 0908    Lab Status: Final result Updated: 10/18/22 0909     POC Glucose 163 mg/dl                  No orders to display         Procedures  ECG 12 Lead Documentation Only    Date/Time: 10/18/2022 11:23 AM  Performed by: Jomar Schilling MD  Authorized by: Jomar Schilling MD     Patient location:  ED  Interpretation:     Interpretation: normal    Rate:     ECG rate:  99    ECG rate assessment: tachycardic    Rhythm:     Rhythm: sinus rhythm    Ectopy:     Ectopy: none    QRS:     QRS axis:  Normal    QRS intervals:  Normal  Conduction:     Conduction: normal    ST segments:     ST segments:  Normal  T waves:     T waves: normal            ED Course                                       MDM  Number of Diagnoses or Management Options  Abdominal pain  Nausea and vomiting  Diagnosis management comments: Pt's sx improved following interventions initiated in the department  Discussed importance of ceasing marijuana use as it may be a contributing factor  Pt verbally understood, and acknowledged the importance of marijuana cessation  Dispo- home with strict return precautions        Disposition  Final diagnoses:   Abdominal pain   Nausea and vomiting     Time reflects when diagnosis was documented in both MDM as applicable and the Disposition within this note     Time User Action Codes Description Comment    10/18/2022 10:16 AM Bonnita Manger Add [R10 9] Abdominal pain     10/18/2022 10:16 AM Bonnita Manger Add [R11 0] Nausea     10/18/2022 10:16 AM Bonnita Manger Add [R11 10] Vomiting     10/18/2022 10:16 AM Bonnita Manger Remove [R11 10] Vomiting     10/18/2022 10:16 AM Bonnita Manger Remove [R11 0] Nausea     10/18/2022 10:16 AM Bonnita Manger Add [R11 2] Nausea and vomiting       ED Disposition     ED Disposition   Discharge    Condition   Stable    Date/Time   Tue Oct 18, 2022 10:23 AM    Comment   [address] [facility] discharge to home/self care  Follow-up Information     Follow up With Specialties Details Why Contact Info Additional [address] Emergency Department Emergency Medicine Go to  If symptoms worsen [address] [facility] [address] [facility] Department, [address], [place], [place], [address]          Discharge Medication List as of 10/18/2022 10:23 AM      CONTINUE these medications which have NOT CHANGED    Details   Alcohol Swabs 70 % PADS May substitute brand based on insurance coverage  Check glucose TID , Normal      ALPRAZolam (XANAX) 0 5 mg tablet Take 1 tablet by mouth 2 (two) times a day as needed for anxiety for up to 10 days, Starting Tue 11/7/2017, Until Sat 7/2/2022 at 2359, Print      gabapentin (NEURONTIN) 300 mg capsule Take 300 mg by mouth 2 (two) times a day, Historical Med      glucose blood (OneTouch Verio) test strip May substitute brand based on insurance coverage  Check glucose TID , Normal      insulin glargine (Lantus) 100 units/mL subcutaneous injection Inject 20 Units under the skin daily, Starting Sun 7/3/2022, Normal      insulin lispro (HumaLOG) 100 units/mL injection Inject 3 Units under the skin 3 (three) times a day with meals, Starting Sun 7/3/2022, Normal      !!  Insulin Pen Needle (BD Pen Needle Latisha 2nd Gen) 32G X 4 MM MISC For use with insulin pen  Pharmacy may dispense brand covered by insurance , Normal      !! Insulin Pen Needle (BD Pen Needle Latisha 2nd Gen) 32G X 4 MM MISC For use with insulin pen  Pharmacy may dispense brand covered by insurance , Normal      lisinopril (ZESTRIL) 10 mg tablet Take 10 mg by mouth daily, Historical Med      OneTouch Delica Lancets 12W MISC May substitute brand based on insurance coverage  Check glucose TID , Normal      Pantoprazole Sodium (PROTONIX PO) Take 20 mg by mouth, Historical Med      Zofran ODT 4 MG disintegrating tablet Take 1 tablet (4 mg total) by mouth every 6 (six) hours as needed for nausea or vomiting, Starting Sun 7/3/2022, Until Tue 8/2/2022 at 2359, Normal       !! - Potential duplicate medications found  Please discuss with provider  No discharge procedures on file  PDMP Review       Value Time User    PDMP Reviewed  Yes 1/25/2022 10:27 PM Jase Logan MD           ED Provider  Attending physically available and evaluated Torri Acevedo  ALBERT managed the patient along with the ED Attending      Electronically Signed by         Sarah Samayoa MD  10/18/22 7269

## 2022-11-30 ENCOUNTER — APPOINTMENT (EMERGENCY)
Dept: RADIOLOGY | Facility: HOSPITAL | Age: 57
End: 2022-11-30

## 2022-11-30 ENCOUNTER — HOSPITAL ENCOUNTER (EMERGENCY)
Facility: HOSPITAL | Age: 57
Discharge: HOME | End: 2022-12-01
Attending: EMERGENCY MEDICINE

## 2022-11-30 ENCOUNTER — APPOINTMENT (EMERGENCY)
Dept: CT IMAGING | Facility: HOSPITAL | Age: 57
End: 2022-11-30

## 2022-11-30 VITALS
SYSTOLIC BLOOD PRESSURE: 152 MMHG | RESPIRATION RATE: 18 BRPM | HEART RATE: 86 BPM | DIASTOLIC BLOOD PRESSURE: 91 MMHG | OXYGEN SATURATION: 95 % | TEMPERATURE: 98.6 F

## 2022-11-30 DIAGNOSIS — K31.84 GASTROPARESIS: Primary | ICD-10-CM

## 2022-11-30 LAB
ALBUMIN SERPL BCP-MCNC: 4.1 G/DL (ref 3.5–5)
ALP SERPL-CCNC: 60 U/L (ref 34–104)
ALT SERPL W P-5'-P-CCNC: 18 U/L (ref 7–52)
ANION GAP SERPL CALCULATED.3IONS-SCNC: 12 MMOL/L (ref 4–13)
AST SERPL W P-5'-P-CCNC: 15 U/L (ref 13–39)
BACTERIA UR QL AUTO: ABNORMAL /HPF
BASOPHILS # BLD AUTO: 0.03 THOUSANDS/ÂΜL (ref 0–0.1)
BASOPHILS NFR BLD AUTO: 0 % (ref 0–1)
BILIRUB SERPL-MCNC: 1.67 MG/DL (ref 0.2–1)
BILIRUB UR QL STRIP: NEGATIVE
BUN SERPL-MCNC: 22 MG/DL (ref 5–25)
CALCIUM SERPL-MCNC: 8.8 MG/DL (ref 8.4–10.2)
CHLORIDE SERPL-SCNC: 95 MMOL/L (ref 96–108)
CLARITY UR: CLEAR
CO2 SERPL-SCNC: 24 MMOL/L (ref 21–32)
COLOR UR: ABNORMAL
CREAT SERPL-MCNC: 0.88 MG/DL (ref 0.6–1.3)
EOSINOPHIL # BLD AUTO: 0 THOUSAND/ÂΜL (ref 0–0.61)
EOSINOPHIL NFR BLD AUTO: 0 % (ref 0–6)
ERYTHROCYTE [DISTWIDTH] IN BLOOD BY AUTOMATED COUNT: 12.8 % (ref 11.6–15.1)
GFR SERPL CREATININE-BSD FRML MDRD: 94 ML/MIN/1.73SQ M
GLUCOSE SERPL-MCNC: 267 MG/DL (ref 65–140)
GLUCOSE UR STRIP-MCNC: ABNORMAL MG/DL
HCT VFR BLD AUTO: 49.2 % (ref 36.5–49.3)
HGB BLD-MCNC: 17.3 G/DL (ref 12–17)
HGB UR QL STRIP.AUTO: ABNORMAL
IMM GRANULOCYTES # BLD AUTO: 0.03 THOUSAND/UL (ref 0–0.2)
IMM GRANULOCYTES NFR BLD AUTO: 0 % (ref 0–2)
KETONES UR STRIP-MCNC: ABNORMAL MG/DL
LEUKOCYTE ESTERASE UR QL STRIP: ABNORMAL
LIPASE SERPL-CCNC: 34 U/L (ref 11–82)
LYMPHOCYTES # BLD AUTO: 1.16 THOUSANDS/ÂΜL (ref 0.6–4.47)
LYMPHOCYTES NFR BLD AUTO: 14 % (ref 14–44)
MCH RBC QN AUTO: 29.3 PG (ref 26.8–34.3)
MCHC RBC AUTO-ENTMCNC: 35.2 G/DL (ref 31.4–37.4)
MCV RBC AUTO: 83 FL (ref 82–98)
MONOCYTES # BLD AUTO: 0.93 THOUSAND/ÂΜL (ref 0.17–1.22)
MONOCYTES NFR BLD AUTO: 12 % (ref 4–12)
NEUTROPHILS # BLD AUTO: 5.89 THOUSANDS/ÂΜL (ref 1.85–7.62)
NEUTS SEG NFR BLD AUTO: 74 % (ref 43–75)
NITRITE UR QL STRIP: NEGATIVE
NON-SQ EPI CELLS URNS QL MICRO: ABNORMAL /HPF
NRBC BLD AUTO-RTO: 0 /100 WBCS
PH UR STRIP.AUTO: 5.5 [PH]
PLATELET # BLD AUTO: 252 THOUSANDS/UL (ref 149–390)
PMV BLD AUTO: 9.2 FL (ref 8.9–12.7)
POTASSIUM SERPL-SCNC: 4 MMOL/L (ref 3.5–5.3)
PROT SERPL-MCNC: 6.8 G/DL (ref 6.4–8.4)
PROT UR STRIP-MCNC: ABNORMAL MG/DL
RBC # BLD AUTO: 5.9 MILLION/UL (ref 3.88–5.62)
RBC #/AREA URNS AUTO: ABNORMAL /HPF
SODIUM SERPL-SCNC: 131 MMOL/L (ref 135–147)
SP GR UR STRIP.AUTO: 1.04 (ref 1–1.03)
UROBILINOGEN UR STRIP-ACNC: <2 MG/DL
WBC # BLD AUTO: 8.04 THOUSAND/UL (ref 4.31–10.16)
WBC #/AREA URNS AUTO: ABNORMAL /HPF

## 2022-11-30 RX ORDER — SODIUM CHLORIDE 9 MG/ML
1000 INJECTION, SOLUTION INTRAVENOUS ONCE
Status: COMPLETED | OUTPATIENT
Start: 2022-11-30 | End: 2022-11-30

## 2022-11-30 RX ORDER — METOCLOPRAMIDE HYDROCHLORIDE 5 MG/ML
10 INJECTION INTRAMUSCULAR; INTRAVENOUS ONCE
Status: COMPLETED | OUTPATIENT
Start: 2022-11-30 | End: 2022-11-30

## 2022-11-30 RX ORDER — DIPHENHYDRAMINE HYDROCHLORIDE 50 MG/ML
12.5 INJECTION INTRAMUSCULAR; INTRAVENOUS ONCE
Status: COMPLETED | OUTPATIENT
Start: 2022-11-30 | End: 2022-11-30

## 2022-11-30 RX ORDER — MORPHINE SULFATE 10 MG/ML
6 INJECTION, SOLUTION INTRAMUSCULAR; INTRAVENOUS ONCE
Status: COMPLETED | OUTPATIENT
Start: 2022-11-30 | End: 2022-11-30

## 2022-11-30 RX ADMIN — SODIUM CHLORIDE 1000 ML: 0.9 INJECTION, SOLUTION INTRAVENOUS at 21:02

## 2022-11-30 RX ADMIN — MORPHINE SULFATE 6 MG: 10 INJECTION INTRAVENOUS at 20:18

## 2022-11-30 RX ADMIN — DIPHENHYDRAMINE HYDROCHLORIDE 12.5 MG: 50 INJECTION, SOLUTION INTRAMUSCULAR; INTRAVENOUS at 21:03

## 2022-11-30 RX ADMIN — METOCLOPRAMIDE 10 MG: 5 INJECTION, SOLUTION INTRAMUSCULAR; INTRAVENOUS at 21:05

## 2022-11-30 RX ADMIN — IOHEXOL 100 ML: 350 INJECTION, SOLUTION INTRAVENOUS at 22:15

## 2022-12-01 VITALS
RESPIRATION RATE: 18 BRPM | DIASTOLIC BLOOD PRESSURE: 79 MMHG | WEIGHT: 242 LBS | HEIGHT: 75 IN | BODY MASS INDEX: 30.09 KG/M2 | TEMPERATURE: 98.5 F | OXYGEN SATURATION: 91 % | SYSTOLIC BLOOD PRESSURE: 141 MMHG | HEART RATE: 92 BPM

## 2022-12-01 DIAGNOSIS — K31.84 GASTROPARESIS: Primary | ICD-10-CM

## 2022-12-01 LAB
ALBUMIN SERPL BCP-MCNC: 3.9 G/DL (ref 3.5–5)
ALP SERPL-CCNC: 57 U/L (ref 34–104)
ALT SERPL W P-5'-P-CCNC: 19 U/L (ref 7–52)
ANION GAP SERPL CALCULATED.3IONS-SCNC: 10 MMOL/L (ref 4–13)
AST SERPL W P-5'-P-CCNC: 15 U/L (ref 13–39)
BASOPHILS # BLD MANUAL: 0 THOUSAND/UL (ref 0–0.1)
BASOPHILS NFR MAR MANUAL: 0 % (ref 0–1)
BILIRUB SERPL-MCNC: 1.62 MG/DL (ref 0.2–1)
BUN SERPL-MCNC: 19 MG/DL (ref 5–25)
CALCIUM SERPL-MCNC: 8.3 MG/DL (ref 8.4–10.2)
CHLORIDE SERPL-SCNC: 96 MMOL/L (ref 96–108)
CO2 SERPL-SCNC: 24 MMOL/L (ref 21–32)
CREAT SERPL-MCNC: 0.77 MG/DL (ref 0.6–1.3)
EOSINOPHIL # BLD MANUAL: 0 THOUSAND/UL (ref 0–0.4)
EOSINOPHIL NFR BLD MANUAL: 0 % (ref 0–6)
ERYTHROCYTE [DISTWIDTH] IN BLOOD BY AUTOMATED COUNT: 12.8 % (ref 11.6–15.1)
GFR SERPL CREATININE-BSD FRML MDRD: 99 ML/MIN/1.73SQ M
GLUCOSE SERPL-MCNC: 248 MG/DL (ref 65–140)
HCT VFR BLD AUTO: 50 % (ref 36.5–49.3)
HGB BLD-MCNC: 17.3 G/DL (ref 12–17)
LIPASE SERPL-CCNC: 36 U/L (ref 11–82)
LYMPHOCYTES # BLD AUTO: 1.22 THOUSAND/UL (ref 0.6–4.47)
LYMPHOCYTES # BLD AUTO: 17 % (ref 14–44)
MCH RBC QN AUTO: 29 PG (ref 26.8–34.3)
MCHC RBC AUTO-ENTMCNC: 34.6 G/DL (ref 31.4–37.4)
MCV RBC AUTO: 84 FL (ref 82–98)
MONOCYTES # BLD AUTO: 0.57 THOUSAND/UL (ref 0–1.22)
MONOCYTES NFR BLD: 8 % (ref 4–12)
NEUTROPHILS # BLD MANUAL: 5.39 THOUSAND/UL (ref 1.85–7.62)
NEUTS BAND NFR BLD MANUAL: 1 % (ref 0–8)
NEUTS SEG NFR BLD AUTO: 74 % (ref 43–75)
PLATELET # BLD AUTO: 233 THOUSANDS/UL (ref 149–390)
PLATELET BLD QL SMEAR: ADEQUATE
PMV BLD AUTO: 8.9 FL (ref 8.9–12.7)
POTASSIUM SERPL-SCNC: 3.7 MMOL/L (ref 3.5–5.3)
PROT SERPL-MCNC: 6.4 G/DL (ref 6.4–8.4)
RBC # BLD AUTO: 5.96 MILLION/UL (ref 3.88–5.62)
RBC MORPH BLD: NORMAL
SODIUM SERPL-SCNC: 130 MMOL/L (ref 135–147)
WBC # BLD AUTO: 7.18 THOUSAND/UL (ref 4.31–10.16)

## 2022-12-01 RX ORDER — DROPERIDOL 2.5 MG/ML
0.62 INJECTION, SOLUTION INTRAMUSCULAR; INTRAVENOUS ONCE
Status: COMPLETED | OUTPATIENT
Start: 2022-12-01 | End: 2022-12-01

## 2022-12-01 RX ORDER — DIPHENHYDRAMINE HYDROCHLORIDE 50 MG/ML
50 INJECTION INTRAMUSCULAR; INTRAVENOUS ONCE
Status: COMPLETED | OUTPATIENT
Start: 2022-12-01 | End: 2022-12-01

## 2022-12-01 RX ORDER — HALOPERIDOL 2 MG/1
2 TABLET ORAL EVERY 6 HOURS PRN
Qty: 28 TABLET | Refills: 0 | Status: SHIPPED | OUTPATIENT
Start: 2022-12-01 | End: 2022-12-16

## 2022-12-01 RX ORDER — MORPHINE SULFATE 10 MG/ML
6 INJECTION, SOLUTION INTRAMUSCULAR; INTRAVENOUS ONCE
Status: COMPLETED | OUTPATIENT
Start: 2022-12-01 | End: 2022-12-01

## 2022-12-01 RX ADMIN — MORPHINE SULFATE 6 MG: 10 INJECTION INTRAVENOUS at 20:16

## 2022-12-01 RX ADMIN — DROPERIDOL 0.62 MG: 2.5 INJECTION, SOLUTION INTRAMUSCULAR; INTRAVENOUS at 18:58

## 2022-12-01 RX ADMIN — DIPHENHYDRAMINE HYDROCHLORIDE 50 MG: 50 INJECTION, SOLUTION INTRAMUSCULAR; INTRAVENOUS at 18:55

## 2022-12-01 NOTE — ED PROVIDER NOTES
History  Chief Complaint   Patient presents with   • Abdominal Pain     Pt states he has hx of gastroparesis  C/o constipation (last BM Monday), n/v, denies diarrhea  Denies fevers or urinary symptoms  C/o 10/10 left/mid abd pain  Denies CP/SOB     Patient is a 75-year-old male with past medical history DM, gastroparesis, neuropathy, HTN who presented to the ED c/o "violent" vomiting and abdominal pain since around last Thursday  He reports multiple episodes of vomiting and noted brown sputum, what he thought may have been dried blood  Reports some brown sputum with previous gastroparesis episodes but endorses more this time  Also states he felt something in the back of his throat swell up resulting in SOB and dysphagia but has since resolved  He reports he has been unable to keep down food despite Zofran, and has only been able to get some water down since around Monday  He has not been able to take medications consistently  Reports last took Xanax yesterday  Did not take Lisinopril this morning  Has not used insulin in about 2 days  Reports last took Gabapentin Thursday  Reports last BM was Sunday or Monday  Currently rates abdominal pain as 10/10  He notes LUQ pain this time, which is new for him  Denies alcohol use  Patient reports he is planned to get a colonoscopy on Dec 16th  Prior to Admission Medications   Prescriptions Last Dose Informant Patient Reported? Taking? ALPRAZolam (XANAX) 0 5 mg tablet 11/29/2022  No Yes   Sig: Take 1 tablet by mouth 2 (two) times a day as needed for anxiety for up to 10 days   Alcohol Swabs 70 % PADS   No No   Sig: May substitute brand based on insurance coverage  Check glucose TID  Insulin Pen Needle (BD Pen Needle Latisha 2nd Gen) 32G X 4 MM MISC   No No   Sig: For use with insulin pen  Pharmacy may dispense brand covered by insurance  Insulin Pen Needle (BD Pen Needle Latisha 2nd Gen) 32G X 4 MM MISC   No No   Sig: For use with insulin pen   Pharmacy may dispense brand covered by insurance  OneTouch Delica Lancets 72T MISC   No No   Sig: May substitute brand based on insurance coverage  Check glucose TID  Zofran ODT 4 MG disintegrating tablet 11/30/2022  No Yes   Sig: Take 1 tablet (4 mg total) by mouth every 6 (six) hours as needed for nausea or vomiting   gabapentin (NEURONTIN) 300 mg capsule Past Week  Yes Yes   Sig: Take 300 mg by mouth 2 (two) times a day   glucose blood (OneTouch Verio) test strip   No No   Sig: May substitute brand based on insurance coverage  Check glucose TID  insulin glargine (Lantus) 100 units/mL subcutaneous injection 11/29/2022  No Yes   Sig: Inject 20 Units under the skin daily   insulin lispro (HumaLOG) 100 units/mL injection 11/30/2022  No Yes   Sig: Inject 3 Units under the skin 3 (three) times a day with meals   lisinopril (ZESTRIL) 10 mg tablet 11/30/2022  Yes Yes   Sig: Take 10 mg by mouth daily      Facility-Administered Medications: None       Past Medical History:   Diagnosis Date   • Diabetes mellitus ([facility] [address] )    • Gastroparesis    • Hypertension    • Neuropathy        Past Surgical History:   Procedure Laterality Date   • SC AMPUTATION FOOT,TRANSMETATARSAL Left 10/30/2017    Procedure: AMPUTATION TRANSMETATARSAL (TMA);  Surgeon: Toby Emerson DPM;  Location: [facility];  Service: Podiatry   • SC AMPUTATION Marychuy Pellant Left 11/3/2017    Procedure: AMPUTATION TRANSMETATARSAL (TMA), left foot;  Surgeon: Toby Emerson DPM;  Location: [facility];  Service: Podiatry       History reviewed  No pertinent family history  I have reviewed and agree with the history as documented  E-Cigarette/Vaping   • E-Cigarette Use Never User      E-Cigarette/Vaping Substances   • Nicotine No    • Flavoring No      Social History     Tobacco Use   • Smoking status: Some Days     Types: Cigars   • Smokeless tobacco: Never   Vaping Use   • Vaping Use: Never used   Substance Use Topics   • Alcohol use: No   • Drug use:  Yes Types: Marijuana        Review of Systems   Constitutional: Positive for appetite change and chills  Negative for fever  HENT: Negative for trouble swallowing  Eyes: Negative for redness  Respiratory: Negative for shortness of breath  Cardiovascular: Negative for leg swelling  Gastrointestinal: Positive for abdominal pain, constipation, nausea and vomiting  Negative for blood in stool and diarrhea  Neurological: Positive for weakness  Negative for dizziness and light-headedness  Psychiatric/Behavioral: Positive for sleep disturbance  Physical Exam  ED Triage Vitals   Temperature Pulse Respirations Blood Pressure SpO2   11/30/22 1717 11/30/22 1717 11/30/22 1717 11/30/22 1717 11/30/22 1717   98 6 °F (37 °C) (!) 107 20 (!) 181/109 96 %      Temp Source Heart Rate Source Patient Position - Orthostatic VS BP Location FiO2 (%)   11/30/22 1717 11/30/22 1717 11/30/22 1717 11/30/22 1717 --   Oral Monitor Sitting Right arm       Pain Score       11/30/22 2018       10 - Worst Possible Pain             Orthostatic Vital Signs  Vitals:    11/30/22 1717 11/30/22 1922 11/30/22 1930 11/30/22 2230   BP: (!) 181/109 (!) 187/102 (!) 187/102 152/91   Pulse: (!) 107 101 94 86   Patient Position - Orthostatic VS: Sitting  Sitting Sitting       Physical Exam  Vitals and nursing note reviewed  Constitutional:       General: He is not in acute distress  Appearance: He is well-developed  HENT:      Head: Normocephalic and atraumatic  Eyes:      General: No scleral icterus  Conjunctiva/sclera: Conjunctivae normal    Cardiovascular:      Rate and Rhythm: Normal rate and regular rhythm  Heart sounds: No murmur heard  Pulmonary:      Effort: Pulmonary effort is normal  No respiratory distress  Breath sounds: Normal breath sounds  No wheezing, rhonchi or rales  Abdominal:      General: Bowel sounds are decreased  Palpations: Abdomen is soft  Tenderness:  There is abdominal tenderness in the right lower quadrant, left upper quadrant and left lower quadrant  There is no guarding or rebound  Musculoskeletal:         General: No swelling  Cervical back: Neck supple  Skin:     General: Skin is warm and dry  Capillary Refill: Capillary refill takes less than 2 seconds  Coloration: Skin is not jaundiced  Neurological:      General: No focal deficit present  Mental Status: He is alert  Psychiatric:         Mood and Affect: Mood normal          ED Medications  Medications   morphine injection 6 mg (6 mg Intravenous Given 11/30/22 2018)   sodium chloride 0 9 % infusion 1,000 mL (0 mL Intravenous Stopped 11/30/22 2250)   metoclopramide (REGLAN) injection 10 mg (10 mg Intravenous Given 11/30/22 2105)   diphenhydrAMINE (BENADRYL) injection 12 5 mg (12 5 mg Intravenous Given 11/30/22 2103)   iohexol (OMNIPAQUE) 350 MG/ML injection (SINGLE-DOSE) 100 mL (100 mL Intravenous Given 11/30/22 2215)       Diagnostic Studies  Results Reviewed     Procedure Component Value Units Date/Time    Urine Microscopic [[id number]]  (Abnormal) Collected: 11/30/22 1919    Lab Status: Final result Specimen: Urine, Clean Catch Updated: 11/30/22 2159     RBC, UA 2-4 /hpf      WBC, UA 1-2 /hpf      Epithelial Cells Occasional /hpf      Bacteria, UA None Seen /hpf     UA (URINE) with reflex to Scope [[id number]]  (Abnormal) Collected: 11/30/22 1919    Lab Status: Final result Specimen: Urine, Clean Catch Updated: 11/30/22 2030     Color, UA Light Yellow     Clarity, UA Clear     Specific Gravity, UA 1 044     pH, UA 5 5     Leukocytes, UA Elevated glucose may cause decreased leukocyte values   See urine microscopic for [facility] result/     Nitrite, UA Negative     Protein,  (2+) mg/dl      Glucose, UA >=1000 (1%) mg/dl      Ketones, UA 60 (2+) mg/dl      Urobilinogen, UA <2 0 mg/dl      Bilirubin, UA Negative     Occult Blood, UA Small    Comprehensive metabolic panel [[id number]]  (Abnormal) Collected: 11/30/22 1728    Lab Status: Final result Specimen: Blood from Arm, Left Updated: 11/30/22 1803     Sodium 131 mmol/L      Potassium 4 0 mmol/L      Chloride 95 mmol/L      CO2 24 mmol/L      ANION GAP 12 mmol/L      BUN 22 mg/dL      Creatinine 0 88 mg/dL      Glucose 267 mg/dL      Calcium 8 8 mg/dL      AST 15 U/L      ALT 18 U/L      Alkaline Phosphatase 60 U/L      Total Protein 6 8 g/dL      Albumin 4 1 g/dL      Total Bilirubin 1 67 mg/dL      eGFR 94 ml/min/1 73sq m     Narrative:      [employer] guidelines for Chronic Kidney Disease (CKD):   •  Stage 1 with normal or high GFR (GFR > 90 mL/min/1 73 square meters)  •  Stage 2 Mild CKD (GFR = 60-89 mL/min/1 73 square meters)  •  Stage 3A Moderate CKD (GFR = 45-59 mL/min/1 73 square meters)  •  Stage 3B Moderate CKD (GFR = 30-44 mL/min/1 73 square meters)  •  Stage 4 Severe CKD (GFR = 15-29 mL/min/1 73 square meters)  •  Stage 5 End Stage CKD (GFR <15 mL/min/1 73 square meters)  Note: GFR calculation is accurate only with a steady state creatinine    Lipase [[id number]]  (Normal) Collected: 11/30/22 1728    Lab Status: Final result Specimen: Blood from Arm, Left Updated: 11/30/22 1803     Lipase 34 u/L     CBC and differential [[id number]]  (Abnormal) Collected: 11/30/22 1728    Lab Status: Final result Specimen: Blood from Arm, Left Updated: 11/30/22 1744     WBC 8 04 Thousand/uL      RBC 5 90 Million/uL      Hemoglobin 17 3 g/dL      Hematocrit 49 2 %      MCV 83 fL      MCH 29 3 pg      MCHC 35 2 g/dL      RDW 12 8 %      MPV 9 2 fL      Platelets 804 Thousands/uL      nRBC 0 /100 WBCs      Neutrophils Relative 74 %      Immat GRANS % 0 %      Lymphocytes Relative 14 %      Monocytes Relative 12 %      Eosinophils Relative 0 %      Basophils Relative 0 %      Neutrophils Absolute 5 89 Thousands/µL      Immature Grans Absolute 0 03 Thousand/uL      Lymphocytes Absolute 1 16 Thousands/µL      Monocytes Absolute 0 93 Thousand/µL      Eosinophils Absolute 0 00 Thousand/µL      Basophils Absolute 0 03 Thousands/µL                  CT abdomen pelvis with contrast   Final Result by Kyree Reyez DO (11/30 5178)      No acute findings            Workstation performed: [id number]         XR chest 1 view    (Results Pending)         Procedures  Procedures      ED Course  ED Course as of 11/30/22 2351   Wed Nov 30, 2022   2157 Patient reports pain is manageable at this time  2316 Pt reports minimal to no pain at this time; updated on CT results                                       MDM  Number of Diagnoses or Management Options  Gastroparesis  Diagnosis management comments: Patient is a 58 yo male with PMHx of DM, neuropathy, HTN and gastroparesis presenting with multiple episodes of vomiting and abdominal pain  Physical exam positive for RLQ, LUQ, LLQ abdominal pain and decreased bowel sounds  Differential diagnosis includes gastroparesis, cyclical vomiting syndrome/cannabinoid hyperemesis syndrome, or other  CXR and CT WNL  Patient to be discharged to home/self-care  Disposition  Final diagnoses:   Gastroparesis     Time reflects when diagnosis was documented in both MDM as applicable and the Disposition within this note     Time User Action Codes Description Comment    11/30/2022 11:24 PM Carmela Funes Add [I92 [id number]] Gastroparesis       ED Disposition     ED Disposition   Discharge    Condition   Stable    Date/Time   Wed Nov 30, 2022 11:24 PM    [facility] discharge to home/self care  Follow-up Information    None         Patient's Medications   Discharge Prescriptions    No medications on file     No discharge procedures on file  PDMP Review       Value Time User    PDMP Reviewed  Yes 1/25/2022 10:27 PM Micheal Cosme MD           ED Provider  Attending physically available and evaluated Deloresrandall Bere PRICE managed the patient along with the ED Attending      Electronically Signed by         Aida Valadez DO  11/30/22 [address] [facility]

## 2022-12-01 NOTE — ED PROVIDER NOTES
History  Chief Complaint   Patient presents with   • Abdominal Pain     Gastroparesis dx yesterday, scope scheduled for tomorrow, c/o abd pain, n/v      HPI     61-year-old male presenting to this emergency department complaining of abdominal pain, nausea, vomiting  Past medical history significant for diabetes on insulin, gastroparesis, neuropathy, hypertension  Patient reports that since this morning he has had multiple episodes of constant epigastric abdominal cramping radiating to the left upper quadrant associated with nausea and vomiting  Patient was seen in the emergency department yesterday evening for the same complaints  Patient was noted to be hyponatremic at the time with no findings on CT abdomen pelvis  In the ED, patient received 6 mg IV morphine, 10 mg IV Reglan, 12 5 mg diphenhydramine for pain control and felt somewhat improved, subsequently being discharged  This morning, the patient pain has returned and patient has been unable to tolerate p o  Patient reports that he has not been able to eat in 1 week  Aside from these complaints, patient denies chest pain, shortness of breath, sore throat, cough, urinary symptoms, changes in stool, leg pain or leg swelling, rash  No sick contacts, recent travel  Patient is scheduled for follow-up with GI on 12/16 for these complaints  Prior to Admission Medications   Prescriptions Last Dose Informant Patient Reported? Taking? ALPRAZolam (XANAX) 0 5 mg tablet   No No   Sig: Take 1 tablet by mouth 2 (two) times a day as needed for anxiety for up to 10 days   Alcohol Swabs 70 % PADS   No No   Sig: May substitute brand based on insurance coverage  Check glucose TID  Insulin Pen Needle (BD Pen Needle Latisha 2nd Gen) 32G X 4 MM MISC   No No   Sig: For use with insulin pen  Pharmacy may dispense brand covered by insurance  Insulin Pen Needle (BD Pen Needle Latisha 2nd Gen) 32G X 4 MM MISC   No No   Sig: For use with insulin pen   Pharmacy may dispense brand covered by insurance  OneTouch Delica Lancets 71I MISC   No No   Sig: May substitute brand based on insurance coverage  Check glucose TID  Zofran ODT 4 MG disintegrating tablet   No No   Sig: Take 1 tablet (4 mg total) by mouth every 6 (six) hours as needed for nausea or vomiting   gabapentin (NEURONTIN) 300 mg capsule   Yes No   Sig: Take 300 mg by mouth 2 (two) times a day   glucose blood (OneTouch Verio) test strip   No No   Sig: May substitute brand based on insurance coverage  Check glucose TID  insulin glargine (Lantus) 100 units/mL subcutaneous injection   No No   Sig: Inject 20 Units under the skin daily   insulin lispro (HumaLOG) 100 units/mL injection   No No   Sig: Inject 3 Units under the skin 3 (three) times a day with meals   lisinopril (ZESTRIL) 10 mg tablet   Yes No   Sig: Take 10 mg by mouth daily      Facility-Administered Medications: None       Past Medical History:   Diagnosis Date   • [facility]    • Gastroparesis    • Hypertension    • Neuropathy        Past Surgical History:   Procedure Laterality Date   • AL AMPUTATION FOOT,TRANSMETATARSAL Left 10/30/2017    Procedure: AMPUTATION TRANSMETATARSAL (TMA);  Surgeon: Timmy Nageotte, DPM;  Location: [facility];  Service: Podiatry   • AL AMPUTATION Kirti Henry Left 11/3/2017    Procedure: AMPUTATION TRANSMETATARSAL (TMA), left foot;  Surgeon: Timmy Nageotte, DPM;  Location[facility];  Service: Podiatry       History reviewed  No pertinent family history  I have reviewed and agree with the history as documented      E-Cigarette/Vaping   • E-Cigarette Use Never User      E-Cigarette/Vaping Substances   • Nicotine No    • Flavoring No      Social History     Tobacco Use   • Smoking status: Some Days     Types: Cigars   • Smokeless tobacco: Never   Vaping Use   • Vaping Use: Never used   Substance Use Topics   • Alcohol use: No   • Drug use: Yes     Types: Marijuana       Review of Systems   Constitutional: Negative for activity change, chills and fever  HENT: Negative for sneezing and sore throat  Eyes: Negative for pain  Respiratory: Negative for apnea, cough, choking, chest tightness, shortness of breath, wheezing and stridor  Cardiovascular: Negative for chest pain, palpitations and leg swelling  Gastrointestinal: Positive for abdominal pain, nausea and vomiting  Negative for abdominal distention, anal bleeding, blood in stool, constipation, diarrhea and rectal pain  Genitourinary: Negative for dysuria and hematuria  Musculoskeletal: Negative for back pain, gait problem, myalgias, neck pain and neck stiffness  Skin: Negative for color change, pallor, rash and wound  Neurological: Negative for dizziness, tremors, seizures, syncope, facial asymmetry, speech difficulty, weakness, light-headedness, numbness and headaches  Physical Exam  Physical Exam  Vitals and nursing note reviewed  Constitutional:       General: He is not in acute distress  Appearance: He is well-developed and well-nourished  He is not ill-appearing, toxic-appearing or diaphoretic  HENT:      Head: Normocephalic and atraumatic  Right Ear: External ear normal       Left Ear: External ear normal       Nose: Nose normal       Mouth/Throat:      Mouth: Oropharynx is clear and moist    Eyes:      General:         Right eye: No discharge  Left eye: No discharge  Extraocular Movements: EOM normal       Conjunctiva/sclera: Conjunctivae normal       Pupils: Pupils are equal, round, and reactive to light  Cardiovascular:      Rate and Rhythm: Normal rate and regular rhythm  Pulses: Intact distal pulses  Heart sounds: Normal heart sounds  No friction rub  No gallop  Pulmonary:      Effort: Pulmonary effort is normal  No respiratory distress  Breath sounds: Normal breath sounds  No stridor  No wheezing, rhonchi or rales  Chest:      Chest wall: No tenderness     Abdominal:      General: Bowel sounds are normal       Palpations: Abdomen is soft  Tenderness: There is abdominal tenderness in the epigastric area and left upper quadrant  Musculoskeletal:         General: No tenderness, deformity or edema  Normal range of motion  Cervical back: Normal range of motion and neck supple  Skin:     General: Skin is warm and dry  Capillary Refill: Capillary refill takes less than 2 seconds  Neurological:      Mental Status: He is alert and oriented to person, place, and time     Psychiatric:         Mood and Affect: Mood and affect and mood normal          Vital Signs  ED Triage Vitals   Temperature Pulse Respirations Blood Pressure SpO2   12/01/22 1544 12/01/22 1544 12/01/22 1544 12/01/22 1544 12/01/22 1544   98 2 °F (36 8 °C) 89 18 (!) 194/107 97 %      Temp Source Heart Rate Source Patient Position - Orthostatic VS BP Location FiO2 (%)   12/01/22 1545 12/01/22 1545 12/01/22 1853 12/01/22 1853 --   Oral Monitor Lying Right arm       Pain Score       12/01/22 1853       8           Vitals:    12/01/22 1545 12/01/22 1853 12/01/22 1900 12/01/22 2000   BP: (!) 194/107 (!) 186/104 (!) 160/102 154/91   Pulse: 88 89 85 82   Patient Position - Orthostatic VS:  Lying Lying Lying         Visual Acuity      ED Medications  Medications   droperidol (INAPSINE) injection 0 625 mg (0 625 mg Intravenous Given 12/1/22 1858)   diphenhydrAMINE (BENADRYL) injection 50 mg (50 mg Intravenous Given 12/1/22 1855)   morphine injection 6 mg (6 mg Intravenous Given 12/1/22 2016)       Diagnostic Studies  Results Reviewed     Procedure Component Value Units Date/Time    CBC and differential [[id number]]  (Abnormal) Collected: 12/01/22 1557    Lab Status: Final result Specimen: Blood from Arm, Left Updated: 12/01/22 1652     WBC 7 18 Thousand/uL      RBC 5 96 Million/uL      Hemoglobin 17 3 g/dL      Hematocrit 50 0 %      MCV 84 fL      MCH 29 0 pg      MCHC 34 6 g/dL      RDW 12 8 %      MPV 8 9 fL      Platelets 233 Thousands/uL     Narrative: This is an appended report  These results have been appended to a previously verified report      Manual Differential(PHLEBS Do Not Order) [[id number]] Collected: 12/01/22 1557    Lab Status: Final result Specimen: Blood from Arm, Left Updated: 12/01/22 1652     Segmented % 74 %      Bands % 1 %      Lymphocytes % 17 %      Monocytes % 8 %      Eosinophils, % 0 %      Basophils % 0 %      Absolute Neutrophils 5 39 Thousand/uL      Lymphocytes Absolute 1 22 Thousand/uL      Monocytes Absolute 0 57 Thousand/uL      Eosinophils Absolute 0 00 Thousand/uL      Basophils Absolute 0 00 Thousand/uL      Total Counted --     RBC Morphology Normal     Platelet Estimate Adequate    Comprehensive metabolic panel [[id number]]  (Abnormal) Collected: 12/01/22 1557    Lab Status: Final result Specimen: Blood from Arm, Left Updated: 12/01/22 1631     Sodium 130 mmol/L      Potassium 3 7 mmol/L      Chloride 96 mmol/L      CO2 24 mmol/L      ANION GAP 10 mmol/L      BUN 19 mg/dL      Creatinine 0 77 mg/dL      Glucose 248 mg/dL      Calcium 8 3 mg/dL      AST 15 U/L      ALT 19 U/L      Alkaline Phosphatase 57 U/L      Total Protein 6 4 g/dL      Albumin 3 9 g/dL      Total Bilirubin 1 62 mg/dL      eGFR 99 ml/min/1 73sq m     Narrative:      [place] guidelines for Chronic Kidney Disease (CKD):   •  Stage 1 with normal or high GFR (GFR > 90 mL/min/1 73 square meters)  •  Stage 2 Mild CKD (GFR = 60-89 mL/min/1 73 square meters)  •  Stage 3A Moderate CKD (GFR = 45-59 mL/min/1 73 square meters)  •  Stage 3B Moderate CKD (GFR = 30-44 mL/min/1 73 square meters)  •  Stage 4 Severe CKD (GFR = 15-29 mL/min/1 73 square meters)  •  Stage 5 End Stage CKD (GFR <15 mL/min/1 73 square meters)  Note: GFR calculation is accurate only with a steady state creatinine    Lipase [[id number]]  (Normal) Collected: 12/01/22 1557    Lab Status: Final result Specimen: Blood from Arm, Left Updated: 12/01/22 1631     Lipase 36 u/L                  No orders to display              Procedures  Procedures         ED Course  ED Course as of 12/01/22 2118   Thu Dec 01, 2022   166 61-year-old male presenting to the emergency department with abdominal pain, nausea, vomiting, p o  intolerance  Recently diagnosed with gastroparesis  Vital signs on arrival remarkable for hypertension  1756 Blood Pressure(!): 194/107   1756 Physical exam is remarkable for an uncomfortable appearing gentleman with abdominal tenderness on palpation, most prominently in the epigastric region and left upper quadrant without guarding, without rebound tenderness  Abdomen is soft, nondistended  No acute abdomen at this time  Differential diagnosis includes gastroparesis, pancreatitis, gastroenteritis, gastritis, peptic ulcer disease  Triage lab work was already drawn and done on the time of my evaluation  1757 Sodium(!): 130  Down trending   1757 Glucose, Random(!): 248  Uptrending   1757 Hemoglobin(!): 17 3   1757 Droperidol and Benadryl were administered for patient comfort  Will reassess  CT abdomen pelvis yesterday were grossly unremarkable  1904 Blood Pressure(!): 160/102   2008 Patient continues to complain of some abdominal pain  6 mg IV morphine was administered for pain control  Will reassess and p o  challenge when appropriate  2036 Blood Pressure: 154/91   2109 On reassessment, patient feels much improved and has been able to tolerate p o  At this time, patient would like to return home  Strict return precautions were given  Patient remained hemodynamically stable in the ED with no evidence of impending cardiopulmonary instability  Vital signs normalized at time of re-evaluation  Recommended the patient maintain follow-up with gastroenterology  Patient verbalized understanding and will follow up as an outpatient    Upon discharge home patient was fully alert, oriented, GCS 15, ambulatory, without further complaints  MDM    Disposition  Final diagnoses:   Gastroparesis     Time reflects when diagnosis was documented in both MDM as applicable and the Disposition within this note     Time User Action Codes Description Comment    12/1/2022  9:12 PM Donita Valdovinos Mary [[id number]] Gastroparesis       ED Disposition     ED Disposition   Discharge    Condition   Stable    Date/Time   u Dec 1, 2022  9:12 PM    [address] [facility] discharge to home/self care  Follow-up Information     Follow up With Specialties Details Why Contact Info Additional Glenroy Nielsen [facility] on 12/16/2022  [address] [facility] [id number]  Mirian Olsonm Marc [address] [facility], [address] [facility], [address] [place] [address], [id number] [phone]          Patient's Medications   Discharge Prescriptions    HALOPERIDOL (HALDOL) 2 MG TABLET    Take 1 tablet (2 mg total) by mouth every 6 (six) hours as needed (stomach pain) for up to 14 days       Start Date: 12/1/2022 End Date: 12/15/2022       Order Dose: 2 mg       Quantity: 28 tablet    Refills: 0       No discharge procedures on file      PDMP Review       Value Time User    PDMP Reviewed  Yes 1/25/2022 10:27 PM Martin Jurado MD          ED Provider  Electronically Signed by           Andreas Aase, MD  12/01/22 8319

## 2022-12-01 NOTE — ED PROCEDURE NOTE
Procedure  POC Biliary US    Date/Time: 12/1/2022 6:15 PM  Performed by: Chevy Fuentes DO  Authorized by: Chevy Fuentes DO     Patient location:  ED  Performed by:  Resident  Procedure details:     Exam Type:  Educational    Indications comment:  Abdominal pain    Assessment for:  Cholecystitis and cholelithiasis    Views obtained: gallbladder (transverse and longitudinal) and liver      Image quality: non-diagnostic      Image availability:  Video obtained  Findings:     Cholelithiasis: not identified      Common bile duct:  Unable to visualize    Pericholecystic fluid: not identified      Sonographic Turner's sign: negative      Polyps: not identified      Mass: not identified    Interpretation:     Biliary ultrasound impressions comment:  No acute findings            This POCUS was for educational purposes only            Chevy Fuentes DO  12/01/22 1824

## 2022-12-01 NOTE — ED PROCEDURE NOTE
Procedure  POC FAST US    Date/Time: 12/1/2022 6:19 PM  Performed by: Mariella Finnegan DO  Authorized by: Mariella Finnegan DO     Patient location:  ED  Procedure details:     Exam Type:  Educational    Indications: abdominal pain      Assess for:  Intra-abdominal fluid    Views obtained:  LUQ - Splenorenal space, Right thorax, Left thorax and RUQ - Caicedo's Pouch    Image quality: non-diagnostic      Image availability:  Video obtained  FAST Findings:     RUQ (Hepatorenal) free fluid: absent      LUQ (Splenorenal) free fluid: absent      Suprapubic free fluid: absent    Interpretation:     Impressions: negative              This POCUS was for educational purposes only              Mariella Finnegan DO  12/01/22 1117

## 2022-12-01 NOTE — ED ATTENDING ATTESTATION
11/30/2022  Izzy Brown DO, saw and evaluated the patient  I have discussed the patient with the resident/non-physician practitioner and agree with the resident's/non-physician practitioner's findings, Plan of Care, and MDM as documented in the resident's/non-physician practitioner's note, except where noted  All available labs and Radiology studies were reviewed  I was present for key portions of any procedure(s) performed by the resident/non-physician practitioner and I was immediately available to provide assistance  At this point I agree with the current assessment done in the Emergency Department  I have conducted an independent evaluation of this patient a history and physical is as follows:    Patient is a 66-year-old male with a history of diabetes and gastroparesis who presents with abdominal pain, nausea and vomiting  Patient states that his symptoms began several days ago  His symptoms worsened today and he has been unable to tolerate anything by mouth  He described generalized abdominal pain, but it is worse in the left upper quadrant  He states that he has had similar symptoms previously, but the left upper quadrant pain is not typical of his gastroparesis symptoms  He denies fever, chills, diarrhea, constipation or other complaints  He does have Zofran at home but has not helped with his symptoms  On exam, patient is in mild distress secondary to pain and nausea  Mildly dry because membranes  Heart is regular rate and rhythm  Breath sounds normal   Abdomen is soft, tender to palpation diffusely  No rebound or guarding  Labs reveal hyperglycemia with no evidence of DKA  He also has evidence of volume depletion  CT performed given atypical symptoms during this episode  However CT reveals no acute surgical pathology  Patient was treated symptomatically for likely diabetic gastroparesis  His symptoms did improve and he was able to tolerate p o  prior to discharge    He agrees to follow up with PCP and return to ED if symptoms worsen  He is well-appearing and stable for discharge  Portions of the above record have been created with voice recognition software  Occasional wrong word or "sound alike" substitutions may have occurred due to the inherent limitations of voice recognition software  Read the chart carefully and recognize, using context, where substitutions may have occurred  ED Course  ED Course as of 12/04/22 1831   Wed Nov 30, 2022   2347 CT results show no acute intra-abdominal pathology  Patient is tolerating p o  fluids  Patient does have hyperglycemia and ketones in his urine  However he does not have a metabolic acidosis  I suspect his ketones are secondary to vomiting  Patient was given IV fluids in the emergency department and he is stable for discharge           Critical Care Time  Procedures

## 2022-12-01 NOTE — DISCHARGE INSTRUCTIONS
You came in to the ED due to vomiting and abdominal pain  Likely diagnosis is gastroparesis  Please return to the ED if your symptoms persist or worsen, you are dizzy or very drowsy, have trouble thinking clearly or have confusion, cannot be woken up, develop chest pain or difficulty breathing, severe abdominal pain, or see blood in vomit or stools

## 2022-12-01 NOTE — ED PROCEDURE NOTE
Procedure  POC Renal US    Date/Time: 12/1/2022 6:10 PM  Performed by: Josette Goldmann, DO  Authorized by: Josette Goldmann, DO     Patient location:  ED  Performed by:  Resident  Procedure details:     Exam Type:  Educational    Indications: abdominal pain      Views obtained: left kidney and right kidney      Image quality: non-diagnostic      Image availability:  Video obtained  Findings:     LEFT kidney findings: unremarkable      RIGHT kidney findings: unremarkable      Bladder:  Not visualized  Interpretation:     Renal ultrasound impressions comment:  No acute findings            This POCUS was for educational purposes only          Josette Goldmann, DO  12/01/22 1826

## 2022-12-02 NOTE — DISCHARGE INSTRUCTIONS
Please make sure to follow-up with your gastroenterologist for further management and diagnostic testing  If you develop worsening symptoms including fever, intractable nausea or vomiting, inability to pass stool, severe pain, or any other concerning symptoms, please return to the emergency department as these could be signs of worsening illness

## 2022-12-03 ENCOUNTER — HOSPITAL ENCOUNTER (EMERGENCY)
Facility: HOSPITAL | Age: 57
Discharge: HOME | End: 2022-12-03
Attending: EMERGENCY MEDICINE

## 2022-12-03 VITALS
BODY MASS INDEX: 30.62 KG/M2 | TEMPERATURE: 97.4 F | DIASTOLIC BLOOD PRESSURE: 90 MMHG | SYSTOLIC BLOOD PRESSURE: 150 MMHG | WEIGHT: 245 LBS | HEART RATE: 75 BPM | OXYGEN SATURATION: 98 % | RESPIRATION RATE: 18 BRPM

## 2022-12-03 DIAGNOSIS — R11.2 NAUSEA & VOMITING: ICD-10-CM

## 2022-12-03 DIAGNOSIS — K31.84 GASTROPARESIS: Primary | ICD-10-CM

## 2022-12-03 LAB
ALBUMIN SERPL BCP-MCNC: 4.2 G/DL (ref 3.5–5)
ALP SERPL-CCNC: 58 U/L (ref 34–104)
ALT SERPL W P-5'-P-CCNC: 25 U/L (ref 7–52)
ANION GAP SERPL CALCULATED.3IONS-SCNC: 14 MMOL/L (ref 4–13)
AST SERPL W P-5'-P-CCNC: 21 U/L (ref 13–39)
BASOPHILS # BLD AUTO: 0.05 THOUSANDS/ÂΜL (ref 0–0.1)
BASOPHILS NFR BLD AUTO: 1 % (ref 0–1)
BILIRUB SERPL-MCNC: 1.79 MG/DL (ref 0.2–1)
BUN SERPL-MCNC: 19 MG/DL (ref 5–25)
CALCIUM SERPL-MCNC: 8.9 MG/DL (ref 8.4–10.2)
CHLORIDE SERPL-SCNC: 98 MMOL/L (ref 96–108)
CO2 SERPL-SCNC: 20 MMOL/L (ref 21–32)
CREAT SERPL-MCNC: 0.92 MG/DL (ref 0.6–1.3)
EOSINOPHIL # BLD AUTO: 0.24 THOUSAND/ÂΜL (ref 0–0.61)
EOSINOPHIL NFR BLD AUTO: 3 % (ref 0–6)
ERYTHROCYTE [DISTWIDTH] IN BLOOD BY AUTOMATED COUNT: 12.7 % (ref 11.6–15.1)
GFR SERPL CREATININE-BSD FRML MDRD: 90 ML/MIN/1.73SQ M
GLUCOSE SERPL-MCNC: 262 MG/DL (ref 65–140)
HCT VFR BLD AUTO: 53.2 % (ref 36.5–49.3)
HGB BLD-MCNC: 18.6 G/DL (ref 12–17)
IMM GRANULOCYTES # BLD AUTO: 0.05 THOUSAND/UL (ref 0–0.2)
IMM GRANULOCYTES NFR BLD AUTO: 1 % (ref 0–2)
LIPASE SERPL-CCNC: 39 U/L (ref 11–82)
LYMPHOCYTES # BLD AUTO: 2.33 THOUSANDS/ÂΜL (ref 0.6–4.47)
LYMPHOCYTES NFR BLD AUTO: 26 % (ref 14–44)
MCH RBC QN AUTO: 29.4 PG (ref 26.8–34.3)
MCHC RBC AUTO-ENTMCNC: 35 G/DL (ref 31.4–37.4)
MCV RBC AUTO: 84 FL (ref 82–98)
MONOCYTES # BLD AUTO: 0.66 THOUSAND/ÂΜL (ref 0.17–1.22)
MONOCYTES NFR BLD AUTO: 7 % (ref 4–12)
NEUTROPHILS # BLD AUTO: 5.61 THOUSANDS/ÂΜL (ref 1.85–7.62)
NEUTS SEG NFR BLD AUTO: 62 % (ref 43–75)
NRBC BLD AUTO-RTO: 0 /100 WBCS
PLATELET # BLD AUTO: 277 THOUSANDS/UL (ref 149–390)
PMV BLD AUTO: 10 FL (ref 8.9–12.7)
POTASSIUM SERPL-SCNC: 4.1 MMOL/L (ref 3.5–5.3)
PROT SERPL-MCNC: 7 G/DL (ref 6.4–8.4)
RBC # BLD AUTO: 6.33 MILLION/UL (ref 3.88–5.62)
SODIUM SERPL-SCNC: 132 MMOL/L (ref 135–147)
WBC # BLD AUTO: 8.94 THOUSAND/UL (ref 4.31–10.16)

## 2022-12-03 RX ORDER — DROPERIDOL 2.5 MG/ML
0.62 INJECTION, SOLUTION INTRAMUSCULAR; INTRAVENOUS ONCE
Status: COMPLETED | OUTPATIENT
Start: 2022-12-03 | End: 2022-12-03

## 2022-12-03 RX ADMIN — DROPERIDOL 0.62 MG: 2.5 INJECTION, SOLUTION INTRAMUSCULAR; INTRAVENOUS at 14:23

## 2022-12-03 RX ADMIN — DROPERIDOL 0.62 MG: 2.5 INJECTION, SOLUTION INTRAMUSCULAR; INTRAVENOUS at 12:25

## 2022-12-03 RX ADMIN — SODIUM CHLORIDE, SODIUM LACTATE, POTASSIUM CHLORIDE, AND CALCIUM CHLORIDE 1000 ML: 600; 310; 30; 20 INJECTION, SOLUTION INTRAVENOUS at 12:25

## 2022-12-03 NOTE — ED PROVIDER NOTES
History  Chief Complaint   Patient presents with   • Vomiting     Vomiting - seen here yesterday; unable to get prescriptions     HPI Pt is a 60 y/o m presenting to the ED for forceful vomiting and epigastric abdominal pain secondary to gastroparesis  He has been seen in the ED for the same issue three out of the last four days  Returned today because the pharmacy did not have haloperidol for rx  Droperidol helped him yesterday, has had multiple workups and outpatient GI set-up as well  No new symptoms  Prior to Admission Medications   Prescriptions Last Dose Informant Patient Reported? Taking? ALPRAZolam (XANAX) 0 5 mg tablet   No No   Sig: Take 1 tablet by mouth 2 (two) times a day as needed for anxiety for up to 10 days   Alcohol Swabs 70 % PADS   No No   Sig: May substitute brand based on insurance coverage  Check glucose TID  Insulin Pen Needle (BD Pen Needle Latisha 2nd Gen) 32G X 4 MM MISC   No No   Sig: For use with insulin pen  Pharmacy may dispense brand covered by insurance  Insulin Pen Needle (BD Pen Needle Latisha 2nd Gen) 32G X 4 MM MISC   No No   Sig: For use with insulin pen  Pharmacy may dispense brand covered by insurance  OneTouch Delica Lancets 34M MISC   No No   Sig: May substitute brand based on insurance coverage  Check glucose TID  Zofran ODT 4 MG disintegrating tablet   No No   Sig: Take 1 tablet (4 mg total) by mouth every 6 (six) hours as needed for nausea or vomiting   gabapentin (NEURONTIN) 300 mg capsule   Yes No   Sig: Take 300 mg by mouth 2 (two) times a day   glucose blood (OneTouch Verio) test strip   No No   Sig: May substitute brand based on insurance coverage   Check glucose TID    haloperidol (HALDOL) 2 mg tablet   No No   Sig: Take 1 tablet (2 mg total) by mouth every 6 (six) hours as needed (stomach pain) for up to 14 days   insulin glargine (Lantus) 100 units/mL subcutaneous injection   No No   Sig: Inject 20 Units under the skin daily   insulin lispro (HumaLOG) 100 units/mL injection   No No   Sig: Inject 3 Units under the skin 3 (three) times a day with meals   lisinopril (ZESTRIL) 10 mg tablet   Yes No   Sig: Take 10 mg by mouth daily      Facility-Administered Medications: None       Past Medical History:   Diagnosis Date   • Diabetes mellitus ([facility] [address] )    • Gastroparesis    • Hypertension    • Neuropathy        Past Surgical History:   Procedure Laterality Date   • MD AMPUTATION FOOT,TRANSMETATARSAL Left 10/30/2017    Procedure: AMPUTATION TRANSMETATARSAL (TMA);  Surgeon: Blade Cordero DPM;  Location: [facility];  Service: Podiatry   • MD AMPUTATION Shanita Bermudez Left 11/3/2017    Procedure: AMPUTATION TRANSMETATARSAL (TMA), left foot;  Surgeon: Blade Cordero DPM;  Location: [facility];  Service: Podiatry       History reviewed  No pertinent family history  I have reviewed and agree with the history as documented  E-Cigarette/Vaping   • E-Cigarette Use Never User      E-Cigarette/Vaping Substances   • Nicotine No    • Flavoring No      Social History     Tobacco Use   • Smoking status: Some Days     Types: Cigars   • Smokeless tobacco: Never   Vaping Use   • Vaping Use: Never used   Substance Use Topics   • Alcohol use: No   • Drug use: Yes     Types: Marijuana        Review of Systems   Constitutional: Negative for chills and fever  HENT: Negative for sore throat  Respiratory: Negative for cough, shortness of breath and wheezing  Cardiovascular: Negative for chest pain  Gastrointestinal: Positive for abdominal pain, nausea and vomiting  Negative for constipation and diarrhea  Genitourinary: Negative for dysuria  Musculoskeletal: Negative for neck pain and neck stiffness  Skin: Negative for rash  Neurological: Negative for syncope and weakness  Psychiatric/Behavioral: Negative for confusion  All other systems reviewed and are negative        Physical Exam  ED Triage Vitals   Temperature Pulse Respirations Blood Pressure SpO2 12/03/22 1139 12/03/22 1139 12/03/22 1139 12/03/22 1141 12/03/22 1139   (!) 97 4 °F (36 3 °C) 82 20 (!) 178/114 97 %      Temp Source Heart Rate Source Patient Position - Orthostatic VS BP Location FiO2 (%)   12/03/22 1139 -- -- -- --   Oral          Pain Score       12/03/22 1139       4             Orthostatic Vital Signs  Vitals:    12/03/22 1139 12/03/22 1141 12/03/22 1400   BP:  (!) 178/114 150/90   Pulse: 82  75       Physical Exam  Vitals and nursing note reviewed  Constitutional:       General: He is not in acute distress  Appearance: Normal appearance  He is not ill-appearing, toxic-appearing or diaphoretic  HENT:      Head: Normocephalic and atraumatic  Nose: Nose normal       Mouth/Throat:      Mouth: Mucous membranes are moist       Pharynx: Oropharynx is clear  No oropharyngeal exudate or posterior oropharyngeal erythema  Eyes:      General: No scleral icterus  Right eye: No discharge  Left eye: No discharge  Conjunctiva/sclera: Conjunctivae normal       Pupils: Pupils are equal, round, and reactive to light  Neck:      Vascular: No carotid bruit  Cardiovascular:      Rate and Rhythm: Normal rate and regular rhythm  Pulses: Normal pulses  Heart sounds: Normal heart sounds  No murmur heard  No friction rub  No gallop  Pulmonary:      Effort: Pulmonary effort is normal  No respiratory distress  Breath sounds: Normal breath sounds  No stridor  No wheezing, rhonchi or rales  Chest:      Chest wall: No tenderness  Abdominal:      General: There is no distension  Palpations: Abdomen is soft  There is no mass  Tenderness: There is abdominal tenderness (epigastric)  There is no right CVA tenderness, left CVA tenderness, guarding or rebound  Hernia: No hernia is present  Musculoskeletal:         General: Normal range of motion  Cervical back: Normal range of motion and neck supple  No rigidity or tenderness     Lymphadenopathy: Cervical: No cervical adenopathy  Skin:     General: Skin is warm and dry  Coloration: Skin is not jaundiced or pale  Findings: No bruising, erythema, lesion or rash  Neurological:      General: No focal deficit present  Mental Status: He is alert and oriented to person, place, and time  Motor: No weakness     Psychiatric:         Mood and Affect: Mood normal          Behavior: Behavior normal          ED Medications  Medications   droperidol (INAPSINE) injection 0 625 mg (0 625 mg Intravenous Given 12/3/22 1225)   lactated ringers bolus 1,000 mL (0 mL Intravenous Stopped 12/3/22 1426)   droperidol (INAPSINE) injection 0 625 mg (0 625 mg Intravenous Given 12/3/22 1423)       Diagnostic Studies  Results Reviewed     Procedure Component Value Units Date/Time    Lipase [[id number]]  (Normal) Collected: 12/03/22 1222    Lab Status: Final result Specimen: Blood from Hand, Right Updated: 12/03/22 1258     Lipase 39 u/L     Comprehensive metabolic panel [[id number]]  (Abnormal) Collected: 12/03/22 1222    Lab Status: Final result Specimen: Blood from Hand, Right Updated: 12/03/22 1258     Sodium 132 mmol/L      Potassium 4 1 mmol/L      Chloride 98 mmol/L      CO2 20 mmol/L      ANION GAP 14 mmol/L      BUN 19 mg/dL      Creatinine 0 92 mg/dL      Glucose 262 mg/dL      Calcium 8 9 mg/dL      AST 21 U/L      ALT 25 U/L      Alkaline Phosphatase 58 U/L      Total Protein 7 0 g/dL      Albumin 4 2 g/dL      Total Bilirubin 1 79 mg/dL      eGFR 90 ml/min/1 73sq m     Narrative:      [place] guidelines for Chronic Kidney Disease (CKD):   •  Stage 1 with normal or high GFR (GFR > 90 mL/min/1 73 square meters)  •  Stage 2 Mild CKD (GFR = 60-89 mL/min/1 73 square meters)  •  Stage 3A Moderate CKD (GFR = 45-59 mL/min/1 73 square meters)  •  Stage 3B Moderate CKD (GFR = 30-44 mL/min/1 73 square meters)  •  Stage 4 Severe CKD (GFR = 15-29 mL/min/1 73 square meters)  •  Stage 5 End Stage CKD (GFR <15 mL/min/1 73 square meters)  Note: GFR calculation is accurate only with a steady state creatinine    CBC and differential [[id number]]  (Abnormal) Collected: 12/03/22 1222    Lab Status: Final result Specimen: Blood from Hand, Right Updated: 12/03/22 1238     WBC 8 94 Thousand/uL      RBC 6 33 Million/uL      Hemoglobin 18 6 g/dL      Hematocrit 53 2 %      MCV 84 fL      MCH 29 4 pg      MCHC 35 0 g/dL      RDW 12 7 %      MPV 10 0 fL      Platelets 121 Thousands/uL      nRBC 0 /100 WBCs      Neutrophils Relative 62 %      Immat GRANS % 1 %      Lymphocytes Relative 26 %      Monocytes Relative 7 %      Eosinophils Relative 3 %      Basophils Relative 1 %      Neutrophils Absolute 5 61 Thousands/µL      Immature Grans Absolute 0 05 Thousand/uL      Lymphocytes Absolute 2 33 Thousands/µL      Monocytes Absolute 0 66 Thousand/µL      Eosinophils Absolute 0 24 Thousand/µL      Basophils Absolute 0 05 Thousands/µL                  No orders to display         Procedures  Procedures      ED Course     Pt's nausea well controlled with droperidol  Will give another dose of 0 625 prior to discharge  LR almost finished  He is agreeable to plan for discharge w/ return precautions  His wife was able to get the haloperidol filled and he will f/u with GI as scheduled  SBIRT 20yo+    Flowsheet Row Most Recent Value   SBIRT (25 yo +)    In order to provide better care to our patients, we are screening all of our patients for alcohol and drug use  Would it be okay to ask you these screening questions?  Unable to answer at this time Filed at: 12/03/2022 1142                MDM     Gastroparesis  Nausea and vomiting  Dehydration  DM      Disposition  Final diagnoses:   Gastroparesis   Nausea & vomiting     Time reflects when diagnosis was documented in both MDM as applicable and the Disposition within this note     Time User Action Codes Description Comment    12/3/2022  2:08 PM Hanna Claros, Naa Alissa [C19 92] Gastroparesis     12/3/2022  2:08 PM Devang Camargo Add [R11 2] Nausea & vomiting       ED Disposition     ED Disposition   Discharge    Condition   Stable    Date/Time   Sat Dec 3, 2022  2:07 PM    [facility] discharge to home/self care  Follow-up Information    None         Discharge Medication List as of 12/3/2022  2:10 PM      CONTINUE these medications which have NOT CHANGED    Details   Alcohol Swabs 70 % PADS May substitute brand based on insurance coverage  Check glucose TID , Normal      ALPRAZolam (XANAX) 0 5 mg tablet Take 1 tablet by mouth 2 (two) times a day as needed for anxiety for up to 10 days, Starting Tue 11/7/2017, Until Wed 11/30/2022 at 2359, Print      gabapentin (NEURONTIN) 300 mg capsule Take 300 mg by mouth 2 (two) times a day, Historical Med      glucose blood (OneTouch Verio) test strip May substitute brand based on insurance coverage  Check glucose TID , Normal      haloperidol (HALDOL) 2 mg tablet Take 1 tablet (2 mg total) by mouth every 6 (six) hours as needed (stomach pain) for up to 14 days, Starting Thu 12/1/2022, Until Thu 12/15/2022 at 2359, Normal      insulin glargine (Lantus) 100 units/mL subcutaneous injection Inject 20 Units under the skin daily, Starting Sun 7/3/2022, Normal      insulin lispro (HumaLOG) 100 units/mL injection Inject 3 Units under the skin 3 (three) times a day with meals, Starting Sun 7/3/2022, Normal      !! Insulin Pen Needle (BD Pen Needle Latisha 2nd Gen) 32G X 4 MM MISC For use with insulin pen  Pharmacy may dispense brand covered by insurance , Normal      !! Insulin Pen Needle (BD Pen Needle Latisha 2nd Gen) 32G X 4 MM MISC For use with insulin pen  Pharmacy may dispense brand covered by insurance , Normal      lisinopril (ZESTRIL) 10 mg tablet Take 10 mg by mouth daily, Historical Med      OneTouch Delica Lancets 03U MISC May substitute brand based on insurance coverage   Check glucose TID , Normal      Zofran ODT 4 MG disintegrating tablet Take 1 tablet (4 mg total) by mouth every 6 (six) hours as needed for nausea or vomiting, Starting Sun 7/3/2022, Until Wed 11/30/2022 at 2359, Normal       !! - Potential duplicate medications found  Please discuss with provider  No discharge procedures on file  PDMP Review       Value Time User    PDMP Reviewed  Yes 1/25/2022 10:27 PM Albino Solis MD           ED Provider  Attending physically available and evaluated Lin Givens  I managed the patient along with the ED Attending      Electronically Signed by         Yan Berrios DO  12/03/22 7805

## 2022-12-03 NOTE — ED ATTENDING ATTESTATION
12/3/2022  I, Raven Mullen MD, saw and evaluated the patient  I have discussed the patient with the resident/non-physician practitioner and agree with the resident's/non-physician practitioner's findings, Plan of Care, and MDM as documented in the resident's/non-physician practitioner's note, except where noted  All available labs and Radiology studies were reviewed  I was present for key portions of any procedure(s) performed by the resident/non-physician practitioner and I was immediately available to provide assistance  At this point I agree with the current assessment done in the Emergency Department  I have conducted an independent evaluation of this patient a history and physical is as follows:    S:  Chief Complaint   Patient presents with   • Vomiting     Vomiting - seen here yesterday; unable to get prescriptions       Derek Strauss is a 42-year-old male presents with a chief complaint of generalized abdominal pain with associated nausea and vomiting  Patient has a history of gastroparesis and was seen in our department twice over the past 5 days  Patient reports he was unable to get his medications filled at the pharmacy because they did not have haloperidol  No fevers or chills  His symptoms are the same as his previous episodes  Patient is passing gas and stool  Patient reports his pain as generalized crampy and sharp in nature  O:  ED Triage Vitals   Temperature Pulse Respirations Blood Pressure SpO2   12/03/22 1139 12/03/22 1139 12/03/22 1139 12/03/22 1141 12/03/22 1139   (!) 97 4 °F (36 3 °C) 82 20 (!) 178/114 97 %      Temp Source Heart Rate Source Patient Position - Orthostatic VS BP Location FiO2 (%)   12/03/22 1139 -- -- -- --   Oral          Pain Score       12/03/22 1139       4         Physical Exam  Vitals and nursing note reviewed  Constitutional:       General: He is in acute distress (mild)  Appearance: He is well-developed     HENT:      Head: Normocephalic and atraumatic  Eyes:      Extraocular Movements: Extraocular movements intact  Pupils: Pupils are equal, round, and reactive to light  Neck:      Vascular: No JVD  Cardiovascular:      Rate and Rhythm: Normal rate and regular rhythm  Heart sounds: Normal heart sounds  No murmur heard  No friction rub  No gallop  Pulmonary:      Effort: Pulmonary effort is normal  No respiratory distress  Breath sounds: Normal breath sounds  No wheezing or rales  Chest:      Chest wall: No tenderness  Abdominal:      General: There is no distension  Tenderness: There is abdominal tenderness (epigastric)  There is no guarding  Musculoskeletal:         General: No tenderness  Normal range of motion  Cervical back: Normal range of motion  Skin:     General: Skin is warm and dry  Neurological:      General: No focal deficit present  Mental Status: He is alert and oriented to person, place, and time  Psychiatric:         Behavior: Behavior normal          Thought Content: Thought content normal          Judgment: Judgment normal        A/P:  Background: 59 y o  male presents with nausea, vomiting and abdominal pain    Differential DX includes but is not limited to: gastroparesis, cyclic vomiting, pancreatitis, less likely gastroenteritis, doubt sbo    Plan: cbc, cmp, lipase, symptomatic treatment (no narcotics), uds    ED Course  ED Course as of 12/03/22 1608   Sat Dec 03, 2022   1307 Patient is doing well, nausea is improved with droperidol  LR running in but slowly through IV in right hand, will re-assess after a liter        Labs Reviewed   CBC AND DIFFERENTIAL - Abnormal       Result Value Ref Range Status    WBC 8 94  4 31 - 10 16 Thousand/uL Final    RBC 6 33 (*) 3 88 - 5 62 Million/uL Final    Hemoglobin 18 6 (*) 12 0 - 17 0 g/dL Final    Hematocrit 53 2 (*) 36 5 - 49 3 % Final    MCV 84  82 - 98 fL Final    MCH 29 4  26 8 - 34 3 pg Final    MCHC 35 0  31 4 - 37 4 g/dL Final    RDW 12 7  11 6 - 15 1 % Final    MPV 10 0  8 9 - 12 7 fL Final    Platelets 906  913 - 390 Thousands/uL Final    nRBC 0  /100 WBCs Final    Neutrophils Relative 62  43 - 75 % Final    Immat GRANS % 1  0 - 2 % Final    Lymphocytes Relative 26  14 - 44 % Final    Monocytes Relative 7  4 - 12 % Final    Eosinophils Relative 3  0 - 6 % Final    Basophils Relative 1  0 - 1 % Final    Neutrophils Absolute 5 61  1 85 - 7 62 Thousands/µL Final    Immature Grans Absolute 0 05  0 00 - 0 20 Thousand/uL Final    Lymphocytes Absolute 2 33  0 60 - 4 47 Thousands/µL Final    Monocytes Absolute 0 66  0 17 - 1 22 Thousand/µL Final    Eosinophils Absolute 0 24  0 00 - 0 61 Thousand/µL Final    Basophils Absolute 0 05  0 00 - 0 10 Thousands/µL Final   COMPREHENSIVE METABOLIC PANEL - Abnormal    Sodium 132 (*) 135 - 147 mmol/L Final    Potassium 4 1  3 5 - 5 3 mmol/L Final    Comment: Slightly Hemolyzed:Results may be affected  Chloride 98  96 - 108 mmol/L Final    CO2 20 (*) 21 - 32 mmol/L Final    ANION GAP 14 (*) 4 - 13 mmol/L Final    BUN 19  5 - 25 mg/dL Final    Creatinine 0 92  0 60 - 1 30 mg/dL Final    Comment: Standardized to IDMS reference method    Glucose 262 (*) 65 - 140 mg/dL Final    Comment: If the patient is fasting, the ADA then defines impaired fasting glucose as > 100 mg/dL and diabetes as > or equal to 123 mg/dL  Specimen collection should occur prior to Sulfasalazine administration due to the potential for falsely depressed results  Specimen collection should occur prior to Sulfapyridine administration due to the potential for falsely elevated results  Calcium 8 9  8 4 - 10 2 mg/dL Final    AST 21  13 - 39 U/L Final    Comment: Slightly Hemolyzed:Results may be affected  Specimen collection should occur prior to Sulfasalazine administration due to the potential for falsely depressed results       ALT 25  7 - 52 U/L Final    Comment: Specimen collection should occur prior to Sulfasalazine administration due to the potential for falsely depressed results  Alkaline Phosphatase 58  34 - 104 U/L Final    Total Protein 7 0  6 4 - 8 4 g/dL Final    Albumin 4 2  3 5 - 5 0 g/dL Final    Total Bilirubin 1 79 (*) 0 20 - 1 00 mg/dL Final    eGFR 90  ml/min/1 73sq m Final    Narrative:     [place] guidelines for Chronic Kidney Disease (CKD):   •  Stage 1 with normal or high GFR (GFR > 90 mL/min/1 73 square meters)  •  Stage 2 Mild CKD (GFR = 60-89 mL/min/1 73 square meters)  •  Stage 3A Moderate CKD (GFR = 45-59 mL/min/1 73 square meters)  •  Stage 3B Moderate CKD (GFR = 30-44 mL/min/1 73 square meters)  •  Stage 4 Severe CKD (GFR = 15-29 mL/min/1 73 square meters)  •  Stage 5 End Stage CKD (GFR <15 mL/min/1 73 square meters)  Note: GFR calculation is accurate only with a steady state creatinine   LIPASE - Normal    Lipase 39  11 - 82 u/L Final     Medications   droperidol (INAPSINE) injection 0 625 mg (0 625 mg Intravenous Given 12/3/22 1225)   lactated ringers bolus 1,000 mL (0 mL Intravenous Stopped 12/3/22 1426)   droperidol (INAPSINE) injection 0 625 mg (0 625 mg Intravenous Given 12/3/22 1423)         Critical Care Time  Procedures    Time reflects when diagnosis was documented in both MDM as applicable and the Disposition within this note     Time User Action Codes Description Comment    12/3/2022  2:08 PM Génesis Dias Add [K31 84] Gastroparesis     12/3/2022  2:08 PM Génesis Dias Add [R11 2] Nausea & vomiting       ED Disposition     ED Disposition   Discharge    Condition   Stable    Date/Time   Sat Dec 3, 2022  2:07 PM    [address] [place] [address] discharge to home/self care                  Follow-up Information    None

## 2022-12-03 NOTE — DISCHARGE INSTRUCTIONS
Take haloperidol for nausea  Follow-up with GI as scheduled  Return to ED with issues including continuous uncontrolled vomiting, weakness, etc  Continue fluid intake as tolerated

## 2022-12-16 ENCOUNTER — CONSULT (OUTPATIENT)
Dept: GASTROENTEROLOGY | Facility: AMBULARY SURGERY CENTER | Age: 57
End: 2022-12-16

## 2022-12-16 VITALS
HEART RATE: 80 BPM | RESPIRATION RATE: 18 BRPM | WEIGHT: 229.6 LBS | BODY MASS INDEX: 28.55 KG/M2 | DIASTOLIC BLOOD PRESSURE: 72 MMHG | HEIGHT: 75 IN | OXYGEN SATURATION: 100 % | SYSTOLIC BLOOD PRESSURE: 148 MMHG

## 2022-12-16 DIAGNOSIS — R11.15 CYCLICAL VOMITING SYNDROME NOT ASSOCIATED WITH MIGRAINE: ICD-10-CM

## 2022-12-16 DIAGNOSIS — R11.2 NAUSEA AND VOMITING, UNSPECIFIED VOMITING TYPE: Primary | ICD-10-CM

## 2022-12-16 DIAGNOSIS — R93.3 ABNORMAL CT SCAN, COLON: ICD-10-CM

## 2022-12-16 RX ORDER — FAMOTIDINE 20 MG/1
20 TABLET, FILM COATED ORAL 2 TIMES DAILY
Qty: 60 TABLET | Refills: 11 | Status: SHIPPED | OUTPATIENT
Start: 2022-12-16

## 2022-12-16 NOTE — ASSESSMENT & PLAN NOTE
Probable polyp in the cecum adjacent to the ileocecal valve    -Schedule for colonoscopy  -High-fiber diet     -Patient was given instructions about the colonoscopy prep     -Patient was explained about  the risks and benefits of the procedure  Risks including but not limited to bleeding, infection, perforation were explained in detail  Also explained about less than 100% sensitivity with the exam and other alternatives

## 2022-12-16 NOTE — ASSESSMENT & PLAN NOTE
Symptoms appear most likely secondary to diabetic gastroparesis  Also consider cyclic vomiting syndrome from marijuana use  Doubt for peptic ulcer disease or gastric erosions     -discussed with patient and his wife in detail about different possible etiologies and given reassurance  -start on Pepcid twice daily    -advised about small frequent low-fat meal    -may take Zofran as needed    -schedule for upper endoscopy    -Patient was explained about the lifestyle and dietary modifications  Advised to avoid fatty foods, chocolates, caffeine, alcohol and any other triggering foods  Avoid eating for at least 3 hours before going to bed

## 2022-12-16 NOTE — PROGRESS NOTES
Follow-up Note -  [facility]  Purnima Ruiz 1965 male         Reason:  Follow-up    HPI:  Mr Dior Fairbanks has history of poorly controlled diabetes mellitus and was seen in the ER couple of times recently because of episodes of nausea and vomiting  We saw him at the hospital about a year ago when he was recommended to have EGD and colonoscopies  He also never had gastric emptying study  He admits using marijuana  Complaining about episodes of nausea and vomiting and discomfort in upper abdomen  Good appetite, no recent weight loss  Regular bowel movements and denies any blood or mucus in the stool  He never had colonoscopy in the past     He had a few CT scans done which showed mural nodule in the cecum adjacent to the ileocecal valve that was thought to be a polyp  Chaperon: Ms Castellanos Rho: Review of Systems   Constitutional: Negative for activity change, appetite change, chills, diaphoresis, fatigue, fever and unexpected weight change  HENT: Negative for ear discharge, ear pain, facial swelling, hearing loss, nosebleeds, sore throat, tinnitus and voice change  Eyes: Negative for pain, discharge, redness, itching and visual disturbance  Respiratory: Negative for apnea, cough, chest tightness, shortness of breath and wheezing  Cardiovascular: Negative for chest pain and palpitations  Gastrointestinal:        As noted in HPI   Endocrine: Negative for cold intolerance, heat intolerance and polyuria  Genitourinary: Positive for frequency  Negative for difficulty urinating, dysuria, flank pain, hematuria and urgency  Musculoskeletal: Positive for arthralgias  Negative for back pain, gait problem, joint swelling and myalgias  Skin: Negative for rash and wound  Neurological: Negative for dizziness, tremors, seizures, speech difficulty, light-headedness, numbness and headaches  Hematological: Negative for adenopathy  Does not bruise/bleed easily  Psychiatric/Behavioral: Negative for agitation, behavioral problems and confusion  The patient is nervous/anxious  Past Medical History:   Diagnosis Date   • Diabetes mellitus ([facility] [address] )    • Gastroparesis    • Hypertension    • Neuropathy       Past Surgical History:   Procedure Laterality Date   • OR AMPUTATION FOOT,TRANSMETATARSAL Left 10/30/2017    Procedure: AMPUTATION TRANSMETATARSAL (TMA);  Surgeon: Daniel De La Cruz DPM;  Location: [facility];  Service: Podiatry   • OR AMPUTATION [place] Left 11/3/2017    Procedure: AMPUTATION TRANSMETATARSAL (TMA), left foot;  Surgeon: Daniel De La Cruz DPM;  Location: [facility];  Service: Podiatry     Social History     Socioeconomic History   • Marital status: /Civil Union     Spouse name: Not on file   • Number of children: Not on file   • Years of education: Not on file   • Highest education level: Not on file   Occupational History   • Not on file   Tobacco Use   • Smoking status: Some Days     Types: Cigars   • Smokeless tobacco: Never   Vaping Use   • Vaping Use: Never used   Substance and Sexual Activity   • Alcohol use: No   • Drug use: Yes     Types: Marijuana   • Sexual activity: Not Currently   Other Topics Concern   • Not on file   Social History Narrative   • Not on file     Social Determinants of Health     Financial Resource Strain: Not on file   Food Insecurity: Not on file   Transportation Needs: Not on file   Physical Activity: Not on file   Stress: Not on file   Social Connections: Not on file   Intimate Partner Violence: Not on file   Housing Stability: Not on file     History reviewed  No pertinent family history  Patient has no known allergies  Current Outpatient Medications   Medication Sig Dispense Refill   • Alcohol Swabs 70 % PADS May substitute brand based on insurance coverage  Check glucose TID   100 each 0   • bisacodyl (DULCOLAX) 5 mg EC tablet Take 2 tablets (10 mg total) by mouth once for 1 dose 2 tablet 0 • famotidine (PEPCID) 20 mg tablet Take 1 tablet (20 mg total) by mouth 2 (two) times a day 60 tablet 11   • gabapentin (NEURONTIN) 300 mg capsule Take 300 mg by mouth 2 (two) times a day     • glucose blood (OneTouch Verio) test strip May substitute brand based on insurance coverage  Check glucose TID  100 each 0   • haloperidol (HALDOL) 2 mg tablet Take 1 tablet (2 mg total) by mouth every 6 (six) hours as needed (stomach pain) for up to 14 days 28 tablet 0   • insulin glargine (Lantus) 100 units/mL subcutaneous injection Inject 20 Units under the skin daily 10 mL 0   • insulin lispro (HumaLOG) 100 units/mL injection Inject 3 Units under the skin 3 (three) times a day with meals 10 mL 0   • Insulin Pen Needle (BD Pen Needle Latisha 2nd Gen) 32G X 4 MM MISC For use with insulin pen  Pharmacy may dispense brand covered by insurance  100 each 0   • Insulin Pen Needle (BD Pen Needle Latisha 2nd Gen) 32G X 4 MM MISC For use with insulin pen  Pharmacy may dispense brand covered by insurance  100 each 0   • lisinopril (ZESTRIL) 10 mg tablet Take 10 mg by mouth daily     • OneTouch Delica Lancets 01Q MISC May substitute brand based on insurance coverage  Check glucose TID  100 each 0   • polyethylene glycol (GOLYTELY) 4000 mL solution Take 4,000 mL by mouth once for 1 dose 4000 mL 0   • Zofran ODT 4 MG disintegrating tablet Take 1 tablet (4 mg total) by mouth every 6 (six) hours as needed for nausea or vomiting 90 tablet 0   • ALPRAZolam (XANAX) 0 5 mg tablet Take 1 tablet by mouth 2 (two) times a day as needed for anxiety for up to 10 days 30 tablet 0     No current facility-administered medications for this visit  Blood pressure 148/72, pulse 80, resp  rate 18, height 6' 3" (1 905 m), weight 104 kg (229 lb 9 6 oz), SpO2 100 %  PHYSICAL EXAM: Physical Exam  Constitutional:       Appearance: He is well-developed  HENT:      Head: Normocephalic and atraumatic  Eyes:      General: No scleral icterus          Right eye: No discharge  Left eye: No discharge  Conjunctiva/sclera: Conjunctivae normal       Pupils: Pupils are equal, round, and reactive to light  Neck:      Thyroid: No thyromegaly  Vascular: No JVD  Trachea: No tracheal deviation  Cardiovascular:      Rate and Rhythm: Normal rate and regular rhythm  Heart sounds: Normal heart sounds  No murmur heard  No friction rub  No gallop  Pulmonary:      Effort: Pulmonary effort is normal  No respiratory distress  Breath sounds: Normal breath sounds  No wheezing or rales  Chest:      Chest wall: No tenderness  Abdominal:      General: Bowel sounds are normal  There is no distension  Palpations: Abdomen is soft  There is no mass  Tenderness: There is no abdominal tenderness  There is no guarding or rebound  Hernia: No hernia is present  Musculoskeletal:      Cervical back: Neck supple  Lymphadenopathy:      Cervical: No cervical adenopathy  Skin:     General: Skin is warm and dry  Findings: No erythema or rash  Neurological:      Mental Status: He is alert and oriented to person, place, and time  Psychiatric:         Behavior: Behavior normal          Thought Content:  Thought content normal           Lab Results   Component Value Date    WBC 8 94 12/03/2022    HGB 18 6 (H) 12/03/2022    HCT 53 2 (H) 12/03/2022    MCV 84 12/03/2022     12/03/2022     Lab Results   Component Value Date    GLUCOSE 314 (H) 07/04/2021    CALCIUM 8 9 12/03/2022     04/05/2014    K 4 1 12/03/2022    CO2 20 (L) 12/03/2022    CL 98 12/03/2022    BUN 19 12/03/2022    CREATININE 0 92 12/03/2022     Lab Results   Component Value Date    ALT 25 12/03/2022    AST 21 12/03/2022    ALKPHOS 58 12/03/2022    BILITOT 1 44 (H) 04/05/2014     Lab Results   Component Value Date    INR 0 89 07/02/2022    INR 0 89 01/25/2022    INR 0 86 12/13/2018    PROTIME 12 0 07/02/2022    PROTIME 12 0 01/25/2022    PROTIME 11 5 (L) 12/13/2018 No results found  ASSESSMENT & PLAN:    Nausea and vomiting  Symptoms appear most likely secondary to diabetic gastroparesis  Also consider cyclic vomiting syndrome from marijuana use  Doubt for peptic ulcer disease or gastric erosions     -discussed with patient and his wife in detail about different possible etiologies and given reassurance  -start on Pepcid twice daily    -advised about small frequent low-fat meal    -may take Zofran as needed    -schedule for upper endoscopy    -Patient was explained about the lifestyle and dietary modifications  Advised to avoid fatty foods, chocolates, caffeine, alcohol and any other triggering foods  Avoid eating for at least 3 hours before going to bed  Abnormal CT scan, colon  Probable polyp in the cecum adjacent to the ileocecal valve    -Schedule for colonoscopy  -High-fiber diet     -Patient was given instructions about the colonoscopy prep     -Patient was explained about  the risks and benefits of the procedure  Risks including but not limited to bleeding, infection, perforation were explained in detail  Also explained about less than 100% sensitivity with the exam and other alternatives

## 2023-03-26 RX ORDER — LIDOCAINE HYDROCHLORIDE 10 MG/ML
0.5 INJECTION, SOLUTION EPIDURAL; INFILTRATION; INTRACAUDAL; PERINEURAL ONCE AS NEEDED
OUTPATIENT
Start: 2023-03-26

## 2023-03-26 RX ORDER — SODIUM CHLORIDE, SODIUM LACTATE, POTASSIUM CHLORIDE, CALCIUM CHLORIDE 600; 310; 30; 20 MG/100ML; MG/100ML; MG/100ML; MG/100ML
125 INJECTION, SOLUTION INTRAVENOUS CONTINUOUS
OUTPATIENT
Start: 2023-03-26

## (undated) DEVICE — VIAL DECANTER

## (undated) DEVICE — LIGHT HANDLE COVER SLEEVE DISP BLUE STELLAR

## (undated) DEVICE — CURITY IDOFORM PACKING STRIP: Brand: CURITY

## (undated) DEVICE — GAUZE SPONGES,16 PLY: Brand: CURITY

## (undated) DEVICE — CURITY NON-ADHERENT STRIPS: Brand: CURITY

## (undated) DEVICE — SCD SEQUENTIAL COMPRESSION COMFORT SLEEVE MEDIUM KNEE LENGTH: Brand: KENDALL SCD

## (undated) DEVICE — NEEDLE 25G X 1 1/2

## (undated) DEVICE — KERLIX BANDAGE ROLL: Brand: KERLIX

## (undated) DEVICE — SYRINGE 10ML LL

## (undated) DEVICE — SPONGE LAP 18 X 18 IN

## (undated) DEVICE — REM POLYHESIVE ADULT PATIENT RETURN ELECTRODE: Brand: VALLEYLAB

## (undated) DEVICE — PADDING CAST 4 IN  COTTON STRL

## (undated) DEVICE — TUBING SUCTION 5MM X 12 FT

## (undated) DEVICE — CUFF TOURNIQUET 18 X 4 IN QUICK CONNECT DISP 1 BLADDER

## (undated) DEVICE — GLOVE SRG BIOGEL 7.5

## (undated) DEVICE — ABDOMINAL PAD: Brand: DERMACEA

## (undated) DEVICE — GLOVE INDICATOR PI UNDERGLOVE SZ 7.5 BLUE

## (undated) DEVICE — INTENDED FOR TISSUE SEPARATION, AND OTHER PROCEDURES THAT REQUIRE A SHARP SURGICAL BLADE TO PUNCTURE OR CUT.: Brand: BARD-PARKER ® CARBON RIB-BACK BLADES

## (undated) DEVICE — PREP PAD BNS: Brand: CONVERTORS

## (undated) DEVICE — BLADE SAGITTAL 25.6 X 9.5MM

## (undated) DEVICE — UNIVERSAL  MINOR EXTREMITY PK: Brand: CARDINAL HEALTH

## (undated) DEVICE — ACE WRAP 4 IN UNSTERILE

## (undated) DEVICE — 10FR FRAZIER SUCTION HANDLE: Brand: CARDINAL HEALTH